# Patient Record
Sex: MALE | Race: WHITE | NOT HISPANIC OR LATINO | Employment: UNEMPLOYED | URBAN - METROPOLITAN AREA
[De-identification: names, ages, dates, MRNs, and addresses within clinical notes are randomized per-mention and may not be internally consistent; named-entity substitution may affect disease eponyms.]

---

## 2017-11-11 ENCOUNTER — HOSPITAL ENCOUNTER (EMERGENCY)
Facility: MEDICAL CENTER | Age: 62
End: 2017-11-11
Attending: EMERGENCY MEDICINE
Payer: MEDICARE

## 2017-11-11 VITALS
BODY MASS INDEX: 30.49 KG/M2 | WEIGHT: 213 LBS | RESPIRATION RATE: 18 BRPM | TEMPERATURE: 97.7 F | HEIGHT: 70 IN | DIASTOLIC BLOOD PRESSURE: 87 MMHG | HEART RATE: 80 BPM | OXYGEN SATURATION: 96 % | SYSTOLIC BLOOD PRESSURE: 175 MMHG

## 2017-11-11 DIAGNOSIS — R73.9 HYPERGLYCEMIA: ICD-10-CM

## 2017-11-11 DIAGNOSIS — L03.115 CELLULITIS OF RIGHT FOOT: ICD-10-CM

## 2017-11-11 LAB
ALBUMIN SERPL BCP-MCNC: 3.4 G/DL (ref 3.2–4.9)
ALBUMIN/GLOB SERPL: 1.1 G/DL
ALP SERPL-CCNC: 94 U/L (ref 30–99)
ALT SERPL-CCNC: 12 U/L (ref 2–50)
ANION GAP SERPL CALC-SCNC: 11 MMOL/L (ref 0–11.9)
AST SERPL-CCNC: 11 U/L (ref 12–45)
BASOPHILS # BLD AUTO: 0.1 % (ref 0–1.8)
BASOPHILS # BLD: 0.02 K/UL (ref 0–0.12)
BILIRUB SERPL-MCNC: 0.3 MG/DL (ref 0.1–1.5)
BUN SERPL-MCNC: 16 MG/DL (ref 8–22)
CALCIUM SERPL-MCNC: 8.8 MG/DL (ref 8.5–10.5)
CHLORIDE SERPL-SCNC: 104 MMOL/L (ref 96–112)
CO2 SERPL-SCNC: 25 MMOL/L (ref 20–33)
CREAT SERPL-MCNC: 0.95 MG/DL (ref 0.5–1.4)
EOSINOPHIL # BLD AUTO: 0.01 K/UL (ref 0–0.51)
EOSINOPHIL NFR BLD: 0.1 % (ref 0–6.9)
ERYTHROCYTE [DISTWIDTH] IN BLOOD BY AUTOMATED COUNT: 45.8 FL (ref 35.9–50)
GFR SERPL CREATININE-BSD FRML MDRD: >60 ML/MIN/1.73 M 2
GLOBULIN SER CALC-MCNC: 3.1 G/DL (ref 1.9–3.5)
GLUCOSE SERPL-MCNC: 471 MG/DL (ref 65–99)
HCT VFR BLD AUTO: 45.2 % (ref 42–52)
HGB BLD-MCNC: 14.6 G/DL (ref 14–18)
IMM GRANULOCYTES # BLD AUTO: 0.08 K/UL (ref 0–0.11)
IMM GRANULOCYTES NFR BLD AUTO: 0.6 % (ref 0–0.9)
LYMPHOCYTES # BLD AUTO: 0.9 K/UL (ref 1–4.8)
LYMPHOCYTES NFR BLD: 6.2 % (ref 22–41)
MCH RBC QN AUTO: 29.2 PG (ref 27–33)
MCHC RBC AUTO-ENTMCNC: 32.3 G/DL (ref 33.7–35.3)
MCV RBC AUTO: 90.4 FL (ref 81.4–97.8)
MONOCYTES # BLD AUTO: 0.46 K/UL (ref 0–0.85)
MONOCYTES NFR BLD AUTO: 3.2 % (ref 0–13.4)
NEUTROPHILS # BLD AUTO: 13.07 K/UL (ref 1.82–7.42)
NEUTROPHILS NFR BLD: 89.8 % (ref 44–72)
NRBC # BLD AUTO: 0 K/UL
NRBC BLD AUTO-RTO: 0 /100 WBC
PLATELET # BLD AUTO: 361 K/UL (ref 164–446)
PMV BLD AUTO: 9.4 FL (ref 9–12.9)
POTASSIUM SERPL-SCNC: 4.1 MMOL/L (ref 3.6–5.5)
PROT SERPL-MCNC: 6.5 G/DL (ref 6–8.2)
RBC # BLD AUTO: 5 M/UL (ref 4.7–6.1)
SODIUM SERPL-SCNC: 140 MMOL/L (ref 135–145)
WBC # BLD AUTO: 14.5 K/UL (ref 4.8–10.8)

## 2017-11-11 PROCEDURE — 96365 THER/PROPH/DIAG IV INF INIT: CPT

## 2017-11-11 PROCEDURE — 700111 HCHG RX REV CODE 636 W/ 250 OVERRIDE (IP): Performed by: EMERGENCY MEDICINE

## 2017-11-11 PROCEDURE — 99284 EMERGENCY DEPT VISIT MOD MDM: CPT

## 2017-11-11 PROCEDURE — 87040 BLOOD CULTURE FOR BACTERIA: CPT

## 2017-11-11 PROCEDURE — 85025 COMPLETE CBC W/AUTO DIFF WBC: CPT

## 2017-11-11 PROCEDURE — 80053 COMPREHEN METABOLIC PANEL: CPT

## 2017-11-11 RX ORDER — CEPHALEXIN 500 MG/1
500 CAPSULE ORAL 4 TIMES DAILY
Qty: 40 CAP | Refills: 0 | Status: SHIPPED | OUTPATIENT
Start: 2017-11-11 | End: 2017-11-21

## 2017-11-11 RX ORDER — LISINOPRIL 40 MG/1
40 TABLET ORAL DAILY
COMMUNITY

## 2017-11-11 RX ORDER — FLUTICASONE PROPIONATE 50 MCG
2 SPRAY, SUSPENSION (ML) NASAL DAILY
COMMUNITY

## 2017-11-11 RX ORDER — AMPICILLIN AND SULBACTAM 2; 1 G/1; G/1
3 INJECTION, POWDER, FOR SOLUTION INTRAMUSCULAR; INTRAVENOUS ONCE
Status: COMPLETED | OUTPATIENT
Start: 2017-11-11 | End: 2017-11-11

## 2017-11-11 RX ORDER — RANITIDINE 150 MG/1
150 TABLET ORAL DAILY
COMMUNITY

## 2017-11-11 RX ORDER — PREDNISONE 10 MG/1
10-40 TABLET ORAL DAILY
COMMUNITY

## 2017-11-11 RX ORDER — AZITHROMYCIN 500 MG/1
500 TABLET, FILM COATED ORAL DAILY
COMMUNITY
Start: 2017-11-02

## 2017-11-11 RX ADMIN — AMPICILLIN SODIUM AND SULBACTAM SODIUM 3 G: 2; 1 INJECTION, POWDER, FOR SOLUTION INTRAMUSCULAR; INTRAVENOUS at 14:49

## 2017-11-11 ASSESSMENT — PAIN SCALES - GENERAL: PAINLEVEL_OUTOF10: 8

## 2017-11-11 NOTE — ED NOTES
The Medication Reconciliation process has been completed by interviewing the patient who had his meds at bedside.    Allergies have been reviewed  Antibiotic use in 30 days - [facility]    Home Pharmacy:  [facility] - [address]

## 2017-11-11 NOTE — ED NOTES
"Pt to triage, states \" he was dc'd from [facility] at the beginning of the month, c/o increased rt foot swelling/ redness , states \" he is diabetic \" , states \" it is red, but denies drainage\" , explained to pt that if he was roomed and called, and was not in the ER he would lose his spot in the waitign room - pt states \" well I don't want to hang around\" , pt to lobby   "

## 2017-11-11 NOTE — ED PROVIDER NOTES
"ED Provider Note    CHIEF COMPLAINT  Chief Complaint   Patient presents with   • Foot Swelling     rt foot   • Wound Infection       HPI  César Duarte is a 62 y.o. male who presents For evaluation of right-sided foot swelling and concerns over cellulitis, he is a diabetic, reports over the past 3 or 4 days he's noted a slight increase in the redness with a little bit of swelling, he does have chronic neuropathy but no changes in his normal sensation, he denies fever, he said no nausea or vomiting. He states that his glucoses generally poorly controlled, he tells me that he has been prescribed medications that he doesn't take for his diabetes. He has no other complaints at this time, he states that he was admitted to [facility] a week ago or so secondary to a bronchial infection.    REVIEW OF SYSTEMS  Negative for fever, abdominal pain, nausea, vomiting, diarrhea, headache. All other systems are negative.     PAST MEDICAL HISTORY  No past medical history on file.    FAMILY HISTORY  No family history on file.    SOCIAL HISTORY  Social History   Substance Use Topics   • Smoking status: Not on file   • Smokeless tobacco: Not on file   • Alcohol use Not on file       SURGICAL HISTORY  No past surgical history on file.    CURRENT MEDICATIONS  I personally reviewed the medication list in the charting documentation.     ALLERGIES  Allergies   Allergen Reactions   • Contrast Media With Iodine [Iodine]        MEDICAL RECORD  I have reviewed patient's medical record and pertinent results are listed above.      PHYSICAL EXAM  VITAL SIGNS: BP (!) 175/87   Pulse 78   Temp 36.5 °C (97.7 °F)   Resp 18   Ht 1.778 m (5' 10\")   Wt 96.6 kg (213 lb)   SpO2 98%   BMI 30.56 kg/m²    Constitutional: Chronically ill-appearing but without overt evidence of acute illness  HENT: Mucus membranes moist.    Eyes: No scleral icterus. Normal conjunctiva   Neck: Supple, comfortable, nonpainful range of motion. "   Cardiovascular: Regular heart rate and rhythm.   Thorax & Lungs: Chest is nontender.  Lungs are clear to auscultation with good air movement bilaterally.  No wheeze, rhonchi, nor rales.   Abdomen: Soft, with no tenderness, rebound nor guarding.  No mass or pulsatile mass appreciated.  Skin: Warm, dry. No rash appreciated  Extremities/Musculoskeletal: Subtle erythema of the right foot with no appreciable warmth, normal sensation distally and normal cap refill distally. Generally dry and scaling skin on his feet.  Neurologic: Alert and oriented ×4. 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  Psychiatric: Normal affect appropriate for the clinical situation.    DIAGNOSTIC STUDIES / PROCEDURES    LABS  Results for orders placed or performed during the hospital encounter of 11/11/17   CBC WITH DIFFERENTIAL   Result Value Ref Range    WBC 14.5 (H) 4.8 - 10.8 K/uL    RBC 5.00 4.70 - 6.10 M/uL    Hemoglobin 14.6 14.0 - 18.0 g/dL    Hematocrit 45.2 42.0 - 52.0 %    MCV 90.4 81.4 - 97.8 fL    MCH 29.2 27.0 - 33.0 pg    MCHC 32.3 (L) 33.7 - 35.3 g/dL    RDW 45.8 35.9 - 50.0 fL    Platelet Count 361 164 - 446 K/uL    MPV 9.4 9.0 - 12.9 fL    Neutrophils-Polys 89.80 (H) 44.00 - 72.00 %    Lymphocytes 6.20 (L) 22.00 - 41.00 %    Monocytes 3.20 0.00 - 13.40 %    Eosinophils 0.10 0.00 - 6.90 %    Basophils 0.10 0.00 - 1.80 %    Immature Granulocytes 0.60 0.00 - 0.90 %    Nucleated RBC 0.00 /100 WBC    Neutrophils (Absolute) 13.07 (H) 1.82 - 7.42 K/uL    Lymphs (Absolute) 0.90 (L) 1.00 - 4.80 K/uL    Monos (Absolute) 0.46 0.00 - 0.85 K/uL    Eos (Absolute) 0.01 0.00 - 0.51 K/uL    Baso (Absolute) 0.02 0.00 - 0.12 K/uL    Immature Granulocytes (abs) 0.08 0.00 - 0.11 K/uL    NRBC (Absolute) 0.00 K/uL   COMP METABOLIC PANEL   Result Value Ref Range    Sodium 140 135 - 145 mmol/L    Potassium 4.1 3.6 - 5.5 mmol/L    Chloride 104 96 - 112 mmol/L    Co2 25 20 - 33 mmol/L    Anion Gap 11.0 0.0 - 11.9     Glucose 471 (H) 65 - 99 mg/dL    Bun 16 8 - 22 mg/dL    Creatinine 0.95 0.50 - 1.40 mg/dL    Calcium 8.8 8.5 - 10.5 mg/dL    AST(SGOT) 11 (L) 12 - 45 U/L    ALT(SGPT) 12 2 - 50 U/L    Alkaline Phosphatase 94 30 - 99 U/L    Total Bilirubin 0.3 0.1 - 1.5 mg/dL    Albumin 3.4 3.2 - 4.9 g/dL    Total Protein 6.5 6.0 - 8.2 g/dL    Globulin 3.1 1.9 - 3.5 g/dL    A-G Ratio 1.1 g/dL   ESTIMATED GFR   Result Value Ref Range    GFR If African American >60 >60 mL/min/1.73 m 2    GFR If Non African American >60 >60 mL/min/1.73 m 2         COURSE & MEDICAL DECISION MAKING  I have reviewed any medical record information, laboratory studies and radiographic results as noted above.  Differential diagnoses includes: Cellulitis, DKA, dehydration, electrolyte abnormalities    Encounter Summary: This is a 62 y.o. male with concern for cellulitis of his right foot, very minor findings on exam. Vital signs reveal hypertension but no tachycardia, he is not febrile, no other acute findings on exam. I will administer a dose of Unasyn, will check blood work. At this point, he is not febrile, is not tachycardic, he is well-appearing so I suspect he will do fine with oral antibiotics. Will go over strict return instructions and at this time he'll be stable and appropriate to be discharged. The patient reports that he does intact have a primary care [facility] within [facility], I have urged him to follow-up with this provider, he will be prescribed Keflex.      DISPOSITION: Discharge home in stable condition      FINAL IMPRESSION  1. Cellulitis of right foot    2. Hyperglycemia           This dictation was created using voice recognition software. The accuracy of the dictation is limited to the abilities of the software. I expect there may be some errors of grammar and possibly content. The nursing notes were reviewed and certain aspects of this information were incorporated into this note.    Electronically signed by: Adan Aaron,  11/11/2017 2:12 PM

## 2017-11-11 NOTE — DISCHARGE INSTRUCTIONS
Return immediately to the emergency department at the first sign of worsening infection including increasing redness, increasing pain, pus in the wound, fever or if you have any other concerns.        Cellulitis  Cellulitis is an infection of the skin and the tissue beneath it. The infected area is usually red and tender. Cellulitis occurs most often in the arms and lower legs.   CAUSES   Cellulitis is caused by bacteria that enter the skin through cracks or cuts in the skin. The most common types of bacteria that cause cellulitis are staphylococci and streptococci.  SIGNS AND SYMPTOMS   · Redness and warmth.  · Swelling.  · Tenderness or pain.  · Fever.  DIAGNOSIS   Your health care provider can usually determine what is wrong based on a physical exam. Blood tests may also be done.  TREATMENT   Treatment usually involves taking an antibiotic medicine.  HOME CARE INSTRUCTIONS   · Take your antibiotic medicine as directed by your health care provider. Finish the antibiotic even if you start to feel better.  · Keep the infected arm or leg elevated to reduce swelling.  · Apply a warm cloth to the affected area up to 4 times per day to relieve pain.  · Take medicines only as directed by your health care provider.  · Keep all follow-up visits as directed by your health care provider.  SEEK MEDICAL CARE IF:   · You notice red streaks coming from the infected area.  · Your red area gets larger or turns dark in color.  · Your bone or joint underneath the infected area becomes painful after the skin has healed.  · Your infection returns in the same area or another area.  · You notice a swollen bump in the infected area.  · You develop new symptoms.  · You have a fever.  SEEK IMMEDIATE MEDICAL CARE IF:   · You feel very sleepy.  · You develop vomiting or diarrhea.  · You have a general ill feeling (malaise) with muscle aches and pains.  MAKE SURE YOU:   · Understand these instructions.  · Will watch your condition.  · Will  get help right away if you are not doing well or get worse.     This information is not intended to replace advice given to you by your health care provider. Make sure you discuss any questions you have with your health care provider.     Document Released: 09/27/2006 Document Revised: 01/08/2016 Document Reviewed: 03/04/2013  Elsevier Interactive Patient Education ©2016 [employer] Inc.

## 2017-11-11 NOTE — ED NOTES
Pt amb to triage desk asking about wait time, I informed him that he was not in the lobby when the triage nurse called him twice, so he was taken out of the system. Pt is now back in the lobby will re-admit him to the department.

## 2017-11-12 NOTE — ED NOTES
Pt discharge home. Pt given discharge instructions . Pt verbalized understanding, all questions answered ,vss upon d/c.

## 2017-11-16 LAB
BACTERIA BLD CULT: NORMAL
BACTERIA BLD CULT: NORMAL
SIGNIFICANT IND 70042: NORMAL
SIGNIFICANT IND 70042: NORMAL
SITE SITE: NORMAL
SITE SITE: NORMAL
SOURCE SOURCE: NORMAL
SOURCE SOURCE: NORMAL

## 2018-08-07 ENCOUNTER — APPOINTMENT (OUTPATIENT)
Dept: RADIOLOGY | Facility: MEDICAL CENTER | Age: 63
End: 2018-08-07
Attending: EMERGENCY MEDICINE
Payer: MEDICARE

## 2018-08-07 ENCOUNTER — HOSPITAL ENCOUNTER (EMERGENCY)
Facility: MEDICAL CENTER | Age: 63
End: 2018-08-07
Attending: EMERGENCY MEDICINE
Payer: MEDICARE

## 2018-08-07 VITALS
SYSTOLIC BLOOD PRESSURE: 189 MMHG | HEIGHT: 70 IN | DIASTOLIC BLOOD PRESSURE: 90 MMHG | RESPIRATION RATE: 20 BRPM | OXYGEN SATURATION: 94 % | WEIGHT: 213 LBS | HEART RATE: 80 BPM | BODY MASS INDEX: 30.49 KG/M2

## 2018-08-07 DIAGNOSIS — R60.9 EDEMA, UNSPECIFIED TYPE: ICD-10-CM

## 2018-08-07 DIAGNOSIS — Z91.199 NONCOMPLIANCE: ICD-10-CM

## 2018-08-07 DIAGNOSIS — I10 HYPERTENSION, UNSPECIFIED TYPE: ICD-10-CM

## 2018-08-07 LAB
ALBUMIN SERPL BCP-MCNC: 4.3 G/DL (ref 3.2–4.9)
ALBUMIN/GLOB SERPL: 1.7 G/DL
ALP SERPL-CCNC: 103 U/L (ref 30–99)
ALT SERPL-CCNC: 16 U/L (ref 2–50)
ANION GAP SERPL CALC-SCNC: 6 MMOL/L (ref 0–11.9)
APTT PPP: 27.6 SEC (ref 24.7–36)
AST SERPL-CCNC: 18 U/L (ref 12–45)
BASOPHILS # BLD AUTO: 1 % (ref 0–1.8)
BASOPHILS # BLD: 0.08 K/UL (ref 0–0.12)
BILIRUB SERPL-MCNC: 0.6 MG/DL (ref 0.1–1.5)
BNP SERPL-MCNC: 16 PG/ML (ref 0–100)
BUN SERPL-MCNC: 13 MG/DL (ref 8–22)
CALCIUM SERPL-MCNC: 9 MG/DL (ref 8.5–10.5)
CHLORIDE SERPL-SCNC: 104 MMOL/L (ref 96–112)
CO2 SERPL-SCNC: 30 MMOL/L (ref 20–33)
CREAT SERPL-MCNC: 0.92 MG/DL (ref 0.5–1.4)
EKG IMPRESSION: NORMAL
EOSINOPHIL # BLD AUTO: 0.13 K/UL (ref 0–0.51)
EOSINOPHIL NFR BLD: 1.7 % (ref 0–6.9)
ERYTHROCYTE [DISTWIDTH] IN BLOOD BY AUTOMATED COUNT: 43.8 FL (ref 35.9–50)
GLOBULIN SER CALC-MCNC: 2.6 G/DL (ref 1.9–3.5)
GLUCOSE SERPL-MCNC: 167 MG/DL (ref 65–99)
HCT VFR BLD AUTO: 50.7 % (ref 42–52)
HGB BLD-MCNC: 17 G/DL (ref 14–18)
IMM GRANULOCYTES # BLD AUTO: 0.02 K/UL (ref 0–0.11)
IMM GRANULOCYTES NFR BLD AUTO: 0.3 % (ref 0–0.9)
INR PPP: 1 (ref 0.87–1.13)
LYMPHOCYTES # BLD AUTO: 2.15 K/UL (ref 1–4.8)
LYMPHOCYTES NFR BLD: 27.8 % (ref 22–41)
MCH RBC QN AUTO: 29.1 PG (ref 27–33)
MCHC RBC AUTO-ENTMCNC: 33.5 G/DL (ref 33.7–35.3)
MCV RBC AUTO: 86.8 FL (ref 81.4–97.8)
MONOCYTES # BLD AUTO: 0.62 K/UL (ref 0–0.85)
MONOCYTES NFR BLD AUTO: 8 % (ref 0–13.4)
NEUTROPHILS # BLD AUTO: 4.74 K/UL (ref 1.82–7.42)
NEUTROPHILS NFR BLD: 61.2 % (ref 44–72)
NRBC # BLD AUTO: 0 K/UL
NRBC BLD-RTO: 0 /100 WBC
PLATELET # BLD AUTO: 287 K/UL (ref 164–446)
PMV BLD AUTO: 8.7 FL (ref 9–12.9)
POTASSIUM SERPL-SCNC: 3.6 MMOL/L (ref 3.6–5.5)
PROT SERPL-MCNC: 6.9 G/DL (ref 6–8.2)
PROTHROMBIN TIME: 12.9 SEC (ref 12–14.6)
RBC # BLD AUTO: 5.84 M/UL (ref 4.7–6.1)
SODIUM SERPL-SCNC: 140 MMOL/L (ref 135–145)
TROPONIN I SERPL-MCNC: 0.02 NG/ML (ref 0–0.04)
WBC # BLD AUTO: 7.7 K/UL (ref 4.8–10.8)

## 2018-08-07 PROCEDURE — 84484 ASSAY OF TROPONIN QUANT: CPT

## 2018-08-07 PROCEDURE — 93005 ELECTROCARDIOGRAM TRACING: CPT | Performed by: EMERGENCY MEDICINE

## 2018-08-07 PROCEDURE — 94760 N-INVAS EAR/PLS OXIMETRY 1: CPT

## 2018-08-07 PROCEDURE — 80053 COMPREHEN METABOLIC PANEL: CPT

## 2018-08-07 PROCEDURE — 83880 ASSAY OF NATRIURETIC PEPTIDE: CPT

## 2018-08-07 PROCEDURE — 71045 X-RAY EXAM CHEST 1 VIEW: CPT

## 2018-08-07 PROCEDURE — 85025 COMPLETE CBC W/AUTO DIFF WBC: CPT

## 2018-08-07 PROCEDURE — 93971 EXTREMITY STUDY: CPT

## 2018-08-07 PROCEDURE — 85730 THROMBOPLASTIN TIME PARTIAL: CPT

## 2018-08-07 PROCEDURE — 99284 EMERGENCY DEPT VISIT MOD MDM: CPT

## 2018-08-07 PROCEDURE — 85610 PROTHROMBIN TIME: CPT

## 2018-08-07 ASSESSMENT — PAIN SCALES - GENERAL: PAINLEVEL_OUTOF10: 8

## 2018-08-07 NOTE — DISCHARGE INSTRUCTIONS
Hypertension  Hypertension is another name for high blood pressure. High blood pressure forces your heart to work harder to pump blood. A blood pressure reading has two numbers, which includes a higher number over a lower number (example: 110/72).  Follow these instructions at home:  · Have your blood pressure rechecked by your doctor.  · Only take medicine as told by your doctor. Follow the directions carefully. The medicine does not work as well if you skip doses. Skipping doses also puts you at risk for problems.  · Do not smoke.  · Monitor your blood pressure at home as told by your doctor.  Contact a doctor if:  · You think you are having a reaction to the medicine you are taking.  · You have repeat headaches or feel dizzy.  · You have puffiness (swelling) in your ankles.  · You have trouble with your vision.  Get help right away if:  · You get a very bad headache and are confused.  · You feel weak, numb, or faint.  · You get chest or belly (abdominal) pain.  · You throw up (vomit).  · You cannot breathe very well.  This information is not intended to replace advice given to you by your health care provider. Make sure you discuss any questions you have with your health care provider.  Document Released: 06/05/2009 Document Revised: 05/25/2017 Document Reviewed: 10/10/2014  [employer] Interactive Patient Education © 2017 [employer]  Peripheral Edema  Peripheral edema is swelling that is caused by a buildup of fluid. Peripheral edema most often affects the lower legs, ankles, and feet. It can also develop in the arms, hands, and face. The area of the body that has peripheral edema will look swollen. It may also feel heavy or warm. Your clothes may start to feel tight. Pressing on the area may make a temporary dent in your skin. You may not be able to move your arm or leg as much as usual.  There are many causes of peripheral edema. It can be a complication of other diseases, such as congestive heart failure,  kidney disease, or a problem with your blood circulation. It also can be a side effect of certain medicines. It often happens to women during pregnancy. Sometimes, the cause is not known. Treating the underlying condition is often the only treatment for peripheral edema.  Follow these instructions at home:  Pay attention to any changes in your symptoms. Take these actions to help with your discomfort:  · Raise (elevate) your legs while you are sitting or lying down.  · Move around often to prevent stiffness and to lessen swelling. Do not sit or stand for long periods of time.  · Wear support stockings as told by your health care provider.  · Follow instructions from your health care provider about limiting salt (sodium) in your diet. Sometimes eating less salt can reduce swelling.  · Take over-the-counter and prescription medicines only as told by your health care provider. Your health care provider may prescribe medicine to help your body get rid of excess water (diuretic).  · Keep all follow-up visits as told by your health care provider. This is important.  Contact a health care provider if:  · You have a fever.  · Your edema starts suddenly or is getting worse, especially if you are pregnant or have a medical condition.  · You have swelling in only one leg.  · You have increased swelling and pain in your legs.  Get help right away if:  · You develop shortness of breath, especially when you are lying down.  · You have pain in your chest or abdomen.  · You feel weak.  · You faint.  This information is not intended to replace advice given to you by your health care provider. Make sure you discuss any questions you have with your health care provider.  Document Released: 01/25/2006 Document Revised: 05/22/2017 Document Reviewed: 06/29/2016  [employer] Interactive Patient Education © 2017 Elsevier Inc.

## 2018-08-07 NOTE — ED NOTES
Pt sitting up in bed eating box lunch. [facility] schedule to come  pt at 1800. Pt aware of plan, denies any further needs at this time.

## 2018-08-07 NOTE — ED PROVIDER NOTES
ED Provider Note    Scribed for Mark Leiva M.D. by Jacinta Brown. 8/7/2018, 11:01 AM.    Primary care provider: None  Means of arrival: Ambulance  History obtained from: Patient  History limited by: None    CHIEF COMPLAINT  •  Extremity Swelling      HPI  César Duarte is a 63 y.o. male who presents to the Emergency Department by ambulance for extremity swelling with an onset of 3 weeks. Patient developed bilateral lower extremity swelling three weeks ago. Swelling is greater on the right than left. He is wheel chair bound with paralysis to his right side. He is experiencing dizziness and shortness of breath. Breathing worsens with laying flat. Negative for cough, chest pain, headache, focal weakness or numbness. History of diabetes and hypertension but is non-complaint with his medications.       REVIEW OF SYSTEMS  Pertinent positives include bilateral lower extremity swelling, dizziness and shortness of breath.   Pertinent negatives include no cough, chest pain, headache, focal weakness or numbness.  As above, all other systems reviewed and are negative. See HPI for further details. C.      PAST MEDICAL HISTORY  History of hypertension and diabetes.      SURGICAL HISTORY  Patient denies a pertinent surgical history.        SOCIAL HISTORY  Patient denies a pertinent social history.       FAMILY HISTORY  History reviewed. No pertinent family history.        CURRENT MEDICATIONS  Home Medications     Reviewed by Merlyn Norman R.N. (Registered Nurse) on 08/07/18 at 1053  Med List Status: Partial   Medication Last Dose Status   AMLODIPINE BESYLATE PO  Active   azithromycin (ZITHROMAX) 500 MG tablet 11/6/2017 Active   fluticasone (FLONASE) 50 MCG/ACT nasal spray 11/11/2017 Active   lisinopril (PRINIVIL, ZESTRIL) 40 MG tablet 11/11/2017 Active   predniSONE (DELTASONE) 10 MG Tab 11/10/2017 Active   ranitidine (ZANTAC) 150 MG Tab 11/11/2017 Active                ALLERGIES  Allergies   Allergen Reactions   •  "Contrast Media With Iodine [Iodine]        PHYSICAL EXAM  VITAL SIGNS: BP (!) 192/102   Pulse 80   Resp 20   Ht 1.778 m (5' 10\")   Wt 96.6 kg (213 lb)   SpO2 96%   BMI 30.56 kg/m²     Vitals reviewed.    Constitutional: Alert in no apparent distress.  HENT: No signs of trauma, Bilateral external ears normal, Nose normal.   Eyes: Pupils are equal and reactive, Conjunctiva normal, Non-icteric.   Neck: Normal range of motion, No tenderness, Supple, No stridor.   Lymphatic: No lymphadenopathy noted.   Cardiovascular: Regular rate and rhythm, no murmurs.   Thorax & Lungs: Normal breath sounds, No respiratory distress, No wheezing, No chest tenderness.   Abdomen: Bowel sounds normal, Soft, No tenderness, No peritoneal signs, No masses, No pulsatile masses.   Skin: Warm, Dry, No erythema, No rash.   Back: No bony tenderness, No CVA tenderness.   Extremities: Intact distal pulses, No cyanosis, 1+ peripheral pulses on lower extremities, 1+ pitting edema to left ankle. 2+ pitting edema to right ankle.   Musculoskeletal:  No tenderness to palpation or major deformities noted.   Neurologic: Alert, Normal sensory function. Dense paresis on right. Strength is 5/5 on left.  Psychiatric: Affect normal, Judgment normal, Mood normal.       DIAGNOSTIC STUDIES / PROCEDURES    LABS  Labs Reviewed   CBC WITH DIFFERENTIAL - Abnormal; Notable for the following:        Result Value    MCHC 33.5 (*)     MPV 8.7 (*)     All other components within normal limits    Narrative:     Indicate which anticoagulants the patient is on:->UNKNOWN   COMP METABOLIC PANEL - Abnormal; Notable for the following:     Glucose 167 (*)     Alkaline Phosphatase 103 (*)     All other components within normal limits    Narrative:     Indicate which anticoagulants the patient is on:->UNKNOWN   BTYPE NATRIURETIC PEPTIDE    Narrative:     Indicate which anticoagulants the patient is on:->UNKNOWN   PROTHROMBIN TIME    Narrative:     Indicate which anticoagulants " the patient is on:->UNKNOWN   APTT    Narrative:     Indicate which anticoagulants the patient is on:->UNKNOWN   TROPONIN    Narrative:     Indicate which anticoagulants the patient is on:->UNKNOWN   ESTIMATED GFR    Narrative:     Indicate which anticoagulants the patient is on:->UNKNOWN      All labs reviewed by me.      EKG Interpretation:  Interpreted by me    12 Lead EKG interpreted by me to show:  Normal sinus rhythm  Rate 76  Axis: Normal  Intervals: prolonged   Normal T waves  Nonspecific ST segment changes  My impression of this EKG: Nonspecific ST segment changes, prolonged QTC interval, Does not indicate STEMI criteria at this time  No old for comparison       RADIOLOGY  LE VENOUS DUPLEX (Specify in Comments Left, Right Or Bilateral)         DX-CHEST-PORTABLE (1 VIEW)   Final Result      No acute cardiopulmonary disease evident.        The radiologist's interpretation of all radiological studies have been reviewed by me.      COURSE & MEDICAL DECISION MAKING  Pertinent Labs & Imaging studies reviewed. (See chart for details)    Differential Diagnoses include but are not limited to: congestive heart failure and peripheral edema.    11:01 AM Obtained and reviewed patient's electronic medical records which indicate an ED visit in 11/2017 for foot swelling and wound infection.    11:15 AM Patient seen and examined at bedside. Patient presents for extremity swelling.      Initial orders in the Emergency Department included EKG, XR chest and laboratory testing: CBC with differential, CMP, estimated GFR, BNP, prothrombin time, APTT and troponin.      2:02 PM Ordered a right lower extremity venous duplex.    3:27 PM the patient's ultrasound shows no evidence of DVT, the patient's labs show no indication of CHF, renal failure, liver failure.  He is suffering from peripheral edema.      I encouraged the patient to take his antihypertensives.  I have encouraged him to follow[facility].  In the emergency department he has had asymptomatic hypertension and has stated that he is refusing to take antihypertensive medications.  I would like him to follow-up with his doctor and have further discussion about this.        Return to the ED for new or persisting symptoms including chest pain or any additional concerns.  The patient verbalizes understanding and will comply.  Patient is stable at the time of discharge.  Vital signs were reviewed: The patient has been hypertensive but is stable at time of discharge with asymptomatic hypertension and noncompliance with antihypertension medications I encouraged him take his medications.      DISPOSITION  Patient will be discharged home in stable condition.      FOLLOW UP  [facility], Emergency Dept  [address] [address]  [phone]    If symptoms worsen    [address] [id number] [phone]    see your primary care doctor for follow up      The patient is referred to a primary physician for blood pressure management, diabetic screening, and for all other preventative health concerns.      OUTPATIENT MEDICATIONS  New Prescriptions    No medications on file       DIAGNOSIS  1. Edema, unspecified type    2. Hypertension, unspecified type    3. Noncompliance           The note accurately reflects work and decisions made by me.  Mark Leiva  8/7/2018  3:28 PM     Jacinta DEAN (Scribe), am scribing for, and in the presence of, Mark Leiva M.D.    Electronically signed by: Jacinta Brown ([email or web address]), 8/7/2018    Mark DEAN M.D. personally performed the services described in this documentation, as scribed by Jacinta Brown in my presence, and it is both accurate and complete.

## 2018-08-07 NOTE — ED TRIAGE NOTES
"Chief Complaint   Patient presents with   • Foot Pain     Right foot pain, swelling x 1 month.    • Dizziness       +2 pitting edema to right foot noted. Pt has history of HTN non-compliant with medications. Pt is a diabetic-uncontrolled. States takes metformin \"sometimes\".     BP (!) 192/102   Pulse 80   Resp 20   Ht 1.778 m (5' 10\")   Wt 96.6 kg (213 lb)   SpO2 96%   BMI 30.56 kg/m²     "

## 2018-08-07 NOTE — DISCHARGE PLANNING
SW notified by RN that Pt needs help getting home. 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 Pt uses electric Wheelchair at home but it was left there when he came in by ambulance.     PCS  Completed and faxed to [facility]  Transport arranged for 1800 .  RN updated on [facility]  time.

## 2018-08-08 NOTE — ED NOTES
Report to EMS.     Discharge paperwork completed with pt-verbalized understanding. Denies any further questions or concerns.

## 2020-08-09 ENCOUNTER — HOSPITAL ENCOUNTER (EMERGENCY)
Dept: HOSPITAL 8 - ED | Age: 65
Discharge: HOME | End: 2020-08-09
Payer: SELF-PAY

## 2020-08-09 VITALS — WEIGHT: 200.62 LBS | HEIGHT: 70 IN | BODY MASS INDEX: 28.72 KG/M2

## 2020-08-09 VITALS — DIASTOLIC BLOOD PRESSURE: 97 MMHG | SYSTOLIC BLOOD PRESSURE: 124 MMHG

## 2020-08-09 DIAGNOSIS — E11.9: ICD-10-CM

## 2020-08-09 DIAGNOSIS — J44.9: ICD-10-CM

## 2020-08-09 DIAGNOSIS — F17.200: ICD-10-CM

## 2020-08-09 DIAGNOSIS — Z86.73: ICD-10-CM

## 2020-08-09 DIAGNOSIS — I10: ICD-10-CM

## 2020-08-09 DIAGNOSIS — B02.9: Primary | ICD-10-CM

## 2020-08-09 PROCEDURE — 99283 EMERGENCY DEPT VISIT LOW MDM: CPT

## 2021-03-03 DIAGNOSIS — Z23 NEED FOR VACCINATION: ICD-10-CM

## 2021-03-05 ENCOUNTER — APPOINTMENT (OUTPATIENT)
Dept: RADIOLOGY | Facility: MEDICAL CENTER | Age: 66
DRG: 190 | End: 2021-03-05
Attending: EMERGENCY MEDICINE
Payer: MEDICARE

## 2021-03-05 ENCOUNTER — HOSPITAL ENCOUNTER (INPATIENT)
Facility: MEDICAL CENTER | Age: 66
LOS: 14 days | DRG: 190 | End: 2021-03-19
Attending: EMERGENCY MEDICINE | Admitting: HOSPITALIST
Payer: MEDICARE

## 2021-03-05 DIAGNOSIS — J44.1 ACUTE EXACERBATION OF CHRONIC OBSTRUCTIVE PULMONARY DISEASE (COPD) (HCC): ICD-10-CM

## 2021-03-05 DIAGNOSIS — T50.905A DRUG-INDUCED RASH WITH EOSINOPHILIA AND SYSTEMIC SYMPTOMS: ICD-10-CM

## 2021-03-05 DIAGNOSIS — J96.01 ACUTE RESPIRATORY FAILURE WITH HYPOXIA (HCC): ICD-10-CM

## 2021-03-05 DIAGNOSIS — D72.12 DRUG-INDUCED RASH WITH EOSINOPHILIA AND SYSTEMIC SYMPTOMS: ICD-10-CM

## 2021-03-05 DIAGNOSIS — R09.02 HYPOXIA: ICD-10-CM

## 2021-03-05 DIAGNOSIS — Z02.9 DISCHARGE PLANNING ISSUES: ICD-10-CM

## 2021-03-05 DIAGNOSIS — S09.90XA CLOSED HEAD INJURY, INITIAL ENCOUNTER: ICD-10-CM

## 2021-03-05 PROBLEM — G93.40 ENCEPHALOPATHY: Status: ACTIVE | Noted: 2021-03-05

## 2021-03-05 PROBLEM — E11.9 DM (DIABETES MELLITUS) (HCC): Status: ACTIVE | Noted: 2021-03-05

## 2021-03-05 PROBLEM — R53.1 WEAKNESS: Status: ACTIVE | Noted: 2021-03-05

## 2021-03-05 PROBLEM — D72.829 LEUKOCYTOSIS: Status: ACTIVE | Noted: 2021-03-05

## 2021-03-05 PROBLEM — R55 SYNCOPE: Status: ACTIVE | Noted: 2021-03-05

## 2021-03-05 LAB
ABO + RH BLD: NORMAL
ABO GROUP BLD: NORMAL
ALBUMIN SERPL BCP-MCNC: 4 G/DL (ref 3.2–4.9)
ALBUMIN/GLOB SERPL: 1.3 G/DL
ALP SERPL-CCNC: 104 U/L (ref 30–99)
ALT SERPL-CCNC: 15 U/L (ref 2–50)
ANION GAP SERPL CALC-SCNC: 10 MMOL/L (ref 7–16)
APTT PPP: 30.4 SEC (ref 24.7–36)
AST SERPL-CCNC: 20 U/L (ref 12–45)
BILIRUB SERPL-MCNC: 0.4 MG/DL (ref 0.1–1.5)
BLD GP AB SCN SERPL QL: NORMAL
BUN SERPL-MCNC: 10 MG/DL (ref 8–22)
CALCIUM SERPL-MCNC: 8.7 MG/DL (ref 8.5–10.5)
CFT BLD TEG: 4.4 MIN (ref 5–10)
CHLORIDE SERPL-SCNC: 99 MMOL/L (ref 96–112)
CK SERPL-CCNC: 89 U/L (ref 0–154)
CLOT ANGLE BLD TEG: 68.5 DEGREES (ref 53–72)
CLOT LYSIS 30M P MA LENFR BLD TEG: 0 % (ref 0–8)
CO2 SERPL-SCNC: 30 MMOL/L (ref 20–33)
CREAT SERPL-MCNC: 1.02 MG/DL (ref 0.5–1.4)
CT.EXTRINSIC BLD ROTEM: 1.5 MIN (ref 1–3)
EKG IMPRESSION: NORMAL
ERYTHROCYTE [DISTWIDTH] IN BLOOD BY AUTOMATED COUNT: 48.9 FL (ref 35.9–50)
EST. AVERAGE GLUCOSE BLD GHB EST-MCNC: 177 MG/DL
ETHANOL BLD-MCNC: <10.1 MG/DL (ref 0–10)
GLOBULIN SER CALC-MCNC: 3.2 G/DL (ref 1.9–3.5)
GLUCOSE BLD-MCNC: 245 MG/DL (ref 65–99)
GLUCOSE SERPL-MCNC: 230 MG/DL (ref 65–99)
HBA1C MFR BLD: 7.8 % (ref 4–5.6)
HCT VFR BLD AUTO: 51.6 % (ref 42–52)
HGB BLD-MCNC: 16.4 G/DL (ref 14–18)
INR PPP: 0.84 (ref 0.87–1.13)
MAGNESIUM SERPL-MCNC: 2 MG/DL (ref 1.5–2.5)
MCF BLD TEG: 67.8 MM (ref 50–70)
MCH RBC QN AUTO: 29.9 PG (ref 27–33)
MCHC RBC AUTO-ENTMCNC: 31.8 G/DL (ref 33.7–35.3)
MCV RBC AUTO: 94.2 FL (ref 81.4–97.8)
NT-PROBNP SERPL IA-MCNC: 278 PG/ML (ref 0–125)
PA AA BLD-ACNC: 1.6 %
PA ADP BLD-ACNC: 15.7 %
PLATELET # BLD AUTO: 247 K/UL (ref 164–446)
PMV BLD AUTO: 9.6 FL (ref 9–12.9)
POTASSIUM SERPL-SCNC: 4.1 MMOL/L (ref 3.6–5.5)
PROCALCITONIN SERPL-MCNC: 0.17 NG/ML
PROT SERPL-MCNC: 7.2 G/DL (ref 6–8.2)
PROTHROMBIN TIME: 11.8 SEC (ref 12–14.6)
RBC # BLD AUTO: 5.48 M/UL (ref 4.7–6.1)
RH BLD: NORMAL
SARS-COV+SARS-COV-2 AG RESP QL IA.RAPID: NOTDETECTED
SARS-COV-2 RNA RESP QL NAA+PROBE: NOTDETECTED
SODIUM SERPL-SCNC: 139 MMOL/L (ref 135–145)
SPECIMEN SOURCE: NORMAL
SPECIMEN SOURCE: NORMAL
TEG ALGORITHM TGALG: ABNORMAL
TROPONIN T SERPL-MCNC: 30 NG/L (ref 6–19)
TROPONIN T SERPL-MCNC: 32 NG/L (ref 6–19)
WBC # BLD AUTO: 16.3 K/UL (ref 4.8–10.8)

## 2021-03-05 PROCEDURE — 99223 1ST HOSP IP/OBS HIGH 75: CPT | Mod: AI | Performed by: HOSPITALIST

## 2021-03-05 PROCEDURE — 700101 HCHG RX REV CODE 250: Performed by: HOSPITALIST

## 2021-03-05 PROCEDURE — 93005 ELECTROCARDIOGRAM TRACING: CPT | Performed by: EMERGENCY MEDICINE

## 2021-03-05 PROCEDURE — U0003 INFECTIOUS AGENT DETECTION BY NUCLEIC ACID (DNA OR RNA); SEVERE ACUTE RESPIRATORY SYNDROME CORONAVIRUS 2 (SARS-COV-2) (CORONAVIRUS DISEASE [COVID-19]), AMPLIFIED PROBE TECHNIQUE, MAKING USE OF HIGH THROUGHPUT TECHNOLOGIES AS DESCRIBED BY CMS-2020-01-R: HCPCS

## 2021-03-05 PROCEDURE — 82550 ASSAY OF CK (CPK): CPT

## 2021-03-05 PROCEDURE — 87040 BLOOD CULTURE FOR BACTERIA: CPT

## 2021-03-05 PROCEDURE — 305948 HCHG GREEN TRAUMA ACT PRE-NOTIFY NO CC

## 2021-03-05 PROCEDURE — A9270 NON-COVERED ITEM OR SERVICE: HCPCS | Performed by: HOSPITALIST

## 2021-03-05 PROCEDURE — 86900 BLOOD TYPING SEROLOGIC ABO: CPT

## 2021-03-05 PROCEDURE — 85730 THROMBOPLASTIN TIME PARTIAL: CPT

## 2021-03-05 PROCEDURE — 85576 BLOOD PLATELET AGGREGATION: CPT | Mod: 91

## 2021-03-05 PROCEDURE — 72125 CT NECK SPINE W/O DYE: CPT

## 2021-03-05 PROCEDURE — 82962 GLUCOSE BLOOD TEST: CPT

## 2021-03-05 PROCEDURE — U0005 INFEC AGEN DETEC AMPLI PROBE: HCPCS

## 2021-03-05 PROCEDURE — 700101 HCHG RX REV CODE 250: Performed by: EMERGENCY MEDICINE

## 2021-03-05 PROCEDURE — 700117 HCHG RX CONTRAST REV CODE 255: Performed by: EMERGENCY MEDICINE

## 2021-03-05 PROCEDURE — 700111 HCHG RX REV CODE 636 W/ 250 OVERRIDE (IP): Performed by: EMERGENCY MEDICINE

## 2021-03-05 PROCEDURE — 84484 ASSAY OF TROPONIN QUANT: CPT

## 2021-03-05 PROCEDURE — 96374 THER/PROPH/DIAG INJ IV PUSH: CPT

## 2021-03-05 PROCEDURE — 85384 FIBRINOGEN ACTIVITY: CPT

## 2021-03-05 PROCEDURE — 85027 COMPLETE CBC AUTOMATED: CPT

## 2021-03-05 PROCEDURE — 71260 CT THORAX DX C+: CPT

## 2021-03-05 PROCEDURE — 94640 AIRWAY INHALATION TREATMENT: CPT

## 2021-03-05 PROCEDURE — 770020 HCHG ROOM/CARE - TELE (206)

## 2021-03-05 PROCEDURE — 80053 COMPREHEN METABOLIC PANEL: CPT

## 2021-03-05 PROCEDURE — 71045 X-RAY EXAM CHEST 1 VIEW: CPT

## 2021-03-05 PROCEDURE — 83036 HEMOGLOBIN GLYCOSYLATED A1C: CPT

## 2021-03-05 PROCEDURE — C9803 HOPD COVID-19 SPEC COLLECT: HCPCS | Performed by: EMERGENCY MEDICINE

## 2021-03-05 PROCEDURE — 87426 SARSCOV CORONAVIRUS AG IA: CPT

## 2021-03-05 PROCEDURE — 99285 EMERGENCY DEPT VISIT HI MDM: CPT

## 2021-03-05 PROCEDURE — 72128 CT CHEST SPINE W/O DYE: CPT

## 2021-03-05 PROCEDURE — 700101 HCHG RX REV CODE 250

## 2021-03-05 PROCEDURE — 84145 PROCALCITONIN (PCT): CPT

## 2021-03-05 PROCEDURE — 94760 N-INVAS EAR/PLS OXIMETRY 1: CPT

## 2021-03-05 PROCEDURE — 700102 HCHG RX REV CODE 250 W/ 637 OVERRIDE(OP): Performed by: HOSPITALIST

## 2021-03-05 PROCEDURE — 36415 COLL VENOUS BLD VENIPUNCTURE: CPT

## 2021-03-05 PROCEDURE — 82077 ASSAY SPEC XCP UR&BREATH IA: CPT

## 2021-03-05 PROCEDURE — 83880 ASSAY OF NATRIURETIC PEPTIDE: CPT

## 2021-03-05 PROCEDURE — 70450 CT HEAD/BRAIN W/O DYE: CPT

## 2021-03-05 PROCEDURE — 94669 MECHANICAL CHEST WALL OSCILL: CPT

## 2021-03-05 PROCEDURE — 85347 COAGULATION TIME ACTIVATED: CPT

## 2021-03-05 PROCEDURE — 72131 CT LUMBAR SPINE W/O DYE: CPT

## 2021-03-05 PROCEDURE — 86901 BLOOD TYPING SEROLOGIC RH(D): CPT

## 2021-03-05 PROCEDURE — 86850 RBC ANTIBODY SCREEN: CPT

## 2021-03-05 PROCEDURE — 85610 PROTHROMBIN TIME: CPT

## 2021-03-05 PROCEDURE — 83735 ASSAY OF MAGNESIUM: CPT

## 2021-03-05 RX ORDER — IPRATROPIUM BROMIDE AND ALBUTEROL SULFATE 2.5; .5 MG/3ML; MG/3ML
3 SOLUTION RESPIRATORY (INHALATION)
Status: DISCONTINUED | OUTPATIENT
Start: 2021-03-05 | End: 2021-03-09

## 2021-03-05 RX ORDER — CYCLOBENZAPRINE HCL 10 MG
5 TABLET ORAL NIGHTLY PRN
COMMUNITY

## 2021-03-05 RX ORDER — CLOPIDOGREL BISULFATE 75 MG/1
75 TABLET ORAL DAILY
COMMUNITY

## 2021-03-05 RX ORDER — CLOPIDOGREL BISULFATE 75 MG/1
75 TABLET ORAL DAILY
Status: DISCONTINUED | OUTPATIENT
Start: 2021-03-06 | End: 2021-03-19 | Stop reason: HOSPADM

## 2021-03-05 RX ORDER — METFORMIN HYDROCHLORIDE 500 MG/1
1000 TABLET, EXTENDED RELEASE ORAL EVERY MORNING
COMMUNITY

## 2021-03-05 RX ORDER — VELPATASVIR AND SOFOSBUVIR 100; 400 MG/1; MG/1
1 TABLET, FILM COATED ORAL DAILY
Status: DISCONTINUED | OUTPATIENT
Start: 2021-03-06 | End: 2021-03-05

## 2021-03-05 RX ORDER — ALOGLIPTIN 25 MG/1
25 TABLET, FILM COATED ORAL DAILY
COMMUNITY

## 2021-03-05 RX ORDER — MV-MIN/VIT C/GLUT/LYSINE/HB124 250-12.5MG
4 TABLET,CHEWABLE ORAL DAILY
COMMUNITY

## 2021-03-05 RX ORDER — LIDOCAINE 50 MG/G
1 OINTMENT TOPICAL 3 TIMES DAILY PRN
COMMUNITY

## 2021-03-05 RX ORDER — ATORVASTATIN CALCIUM 40 MG/1
40 TABLET, FILM COATED ORAL NIGHTLY
COMMUNITY

## 2021-03-05 RX ORDER — ACETAMINOPHEN 325 MG/1
650 TABLET ORAL EVERY 6 HOURS PRN
Status: DISCONTINUED | OUTPATIENT
Start: 2021-03-05 | End: 2021-03-19 | Stop reason: HOSPADM

## 2021-03-05 RX ORDER — DOXYCYCLINE 100 MG/1
100 TABLET ORAL EVERY 12 HOURS
Status: COMPLETED | OUTPATIENT
Start: 2021-03-05 | End: 2021-03-10

## 2021-03-05 RX ORDER — TIOTROPIUM BROMIDE INHALATION SPRAY 3.12 UG/1
5 SPRAY, METERED RESPIRATORY (INHALATION) DAILY
COMMUNITY

## 2021-03-05 RX ORDER — ALBUTEROL SULFATE 90 UG/1
2 AEROSOL, METERED RESPIRATORY (INHALATION) EVERY 6 HOURS PRN
Status: DISCONTINUED | OUTPATIENT
Start: 2021-03-05 | End: 2021-03-19 | Stop reason: HOSPADM

## 2021-03-05 RX ORDER — GABAPENTIN 300 MG/1
600 CAPSULE ORAL
COMMUNITY

## 2021-03-05 RX ORDER — POLYETHYLENE GLYCOL 3350 17 G/17G
1 POWDER, FOR SOLUTION ORAL
Status: DISCONTINUED | OUTPATIENT
Start: 2021-03-05 | End: 2021-03-19 | Stop reason: HOSPADM

## 2021-03-05 RX ORDER — GLIMEPIRIDE 4 MG/1
2 TABLET ORAL EVERY MORNING
COMMUNITY

## 2021-03-05 RX ORDER — ACETAMINOPHEN 500 MG
1000 TABLET ORAL
COMMUNITY

## 2021-03-05 RX ORDER — AMOXICILLIN 250 MG
2 CAPSULE ORAL 2 TIMES DAILY
Status: DISCONTINUED | OUTPATIENT
Start: 2021-03-06 | End: 2021-03-19 | Stop reason: HOSPADM

## 2021-03-05 RX ORDER — VELPATASVIR AND SOFOSBUVIR 100; 400 MG/1; MG/1
1 TABLET, FILM COATED ORAL DAILY
COMMUNITY

## 2021-03-05 RX ORDER — BISACODYL 10 MG
10 SUPPOSITORY, RECTAL RECTAL
Status: DISCONTINUED | OUTPATIENT
Start: 2021-03-05 | End: 2021-03-19 | Stop reason: HOSPADM

## 2021-03-05 RX ORDER — DEXTROSE MONOHYDRATE 25 G/50ML
50 INJECTION, SOLUTION INTRAVENOUS
Status: DISCONTINUED | OUTPATIENT
Start: 2021-03-05 | End: 2021-03-07

## 2021-03-05 RX ORDER — AMLODIPINE BESYLATE 10 MG/1
10 TABLET ORAL DAILY
COMMUNITY

## 2021-03-05 RX ORDER — METHYLPREDNISOLONE SODIUM SUCCINATE 125 MG/2ML
125 INJECTION, POWDER, LYOPHILIZED, FOR SOLUTION INTRAMUSCULAR; INTRAVENOUS ONCE
Status: COMPLETED | OUTPATIENT
Start: 2021-03-05 | End: 2021-03-05

## 2021-03-05 RX ORDER — VALSARTAN 320 MG/1
320 TABLET ORAL
COMMUNITY

## 2021-03-05 RX ORDER — ONDANSETRON 2 MG/ML
4 INJECTION INTRAMUSCULAR; INTRAVENOUS EVERY 4 HOURS PRN
Status: DISCONTINUED | OUTPATIENT
Start: 2021-03-05 | End: 2021-03-19 | Stop reason: HOSPADM

## 2021-03-05 RX ORDER — ATORVASTATIN CALCIUM 40 MG/1
40 TABLET, FILM COATED ORAL NIGHTLY
Status: DISCONTINUED | OUTPATIENT
Start: 2021-03-05 | End: 2021-03-19 | Stop reason: HOSPADM

## 2021-03-05 RX ORDER — AMLODIPINE BESYLATE 10 MG/1
10 TABLET ORAL DAILY
Status: DISCONTINUED | OUTPATIENT
Start: 2021-03-06 | End: 2021-03-19 | Stop reason: HOSPADM

## 2021-03-05 RX ORDER — VALSARTAN 80 MG/1
320 TABLET ORAL
Status: DISCONTINUED | OUTPATIENT
Start: 2021-03-05 | End: 2021-03-19 | Stop reason: HOSPADM

## 2021-03-05 RX ORDER — GABAPENTIN 300 MG/1
600 CAPSULE ORAL
Status: DISCONTINUED | OUTPATIENT
Start: 2021-03-05 | End: 2021-03-19 | Stop reason: HOSPADM

## 2021-03-05 RX ORDER — ONDANSETRON 4 MG/1
4 TABLET, ORALLY DISINTEGRATING ORAL EVERY 4 HOURS PRN
Status: DISCONTINUED | OUTPATIENT
Start: 2021-03-05 | End: 2021-03-19 | Stop reason: HOSPADM

## 2021-03-05 RX ADMIN — ATORVASTATIN CALCIUM 40 MG: 40 TABLET, FILM COATED ORAL at 20:43

## 2021-03-05 RX ADMIN — METHYLPREDNISOLONE SODIUM SUCCINATE 125 MG: 125 INJECTION, POWDER, FOR SOLUTION INTRAMUSCULAR; INTRAVENOUS at 17:22

## 2021-03-05 RX ADMIN — ALBUTEROL SULFATE 2.5 MG: 2.5 SOLUTION RESPIRATORY (INHALATION) at 14:35

## 2021-03-05 RX ADMIN — IPRATROPIUM BROMIDE AND ALBUTEROL SULFATE 3 ML: .5; 2.5 SOLUTION RESPIRATORY (INHALATION) at 20:33

## 2021-03-05 RX ADMIN — INSULIN HUMAN 2 UNITS: 100 INJECTION, SOLUTION PARENTERAL at 20:59

## 2021-03-05 RX ADMIN — DOXYCYCLINE 100 MG: 100 TABLET, FILM COATED ORAL at 20:50

## 2021-03-05 RX ADMIN — VALSARTAN 320 MG: 80 TABLET, FILM COATED ORAL at 20:43

## 2021-03-05 RX ADMIN — IOHEXOL 100 ML: 350 INJECTION, SOLUTION INTRAVENOUS at 14:32

## 2021-03-05 RX ADMIN — IPRATROPIUM BROMIDE 0.5 MG: 0.5 SOLUTION RESPIRATORY (INHALATION) at 16:55

## 2021-03-05 RX ADMIN — ALBUTEROL SULFATE 15 MG/HR: 5 SOLUTION RESPIRATORY (INHALATION) at 16:56

## 2021-03-05 RX ADMIN — GABAPENTIN 600 MG: 300 CAPSULE ORAL at 20:43

## 2021-03-05 ASSESSMENT — ENCOUNTER SYMPTOMS
GASTROINTESTINAL NEGATIVE: 1
WEIGHT LOSS: 0
DIZZINESS: 0
SHORTNESS OF BREATH: 0
FEVER: 0
SORE THROAT: 0
DIAPHORESIS: 0
CONSTITUTIONAL NEGATIVE: 1
WEAKNESS: 0
DEPRESSION: 0
BRUISES/BLEEDS EASILY: 0
MYALGIAS: 0
ABDOMINAL PAIN: 0
MUSCULOSKELETAL NEGATIVE: 1
NAUSEA: 0
EYES NEGATIVE: 1
MEMORY LOSS: 1
SPUTUM PRODUCTION: 1
HEADACHES: 1
COUGH: 1
FOCAL WEAKNESS: 1
PALPITATIONS: 0
BLURRED VISION: 0
CHILLS: 0
VOMITING: 0

## 2021-03-05 ASSESSMENT — LIFESTYLE VARIABLES: SUBSTANCE_ABUSE: 0

## 2021-03-05 ASSESSMENT — PATIENT HEALTH QUESTIONNAIRE - PHQ9
2. FEELING DOWN, DEPRESSED, IRRITABLE, OR HOPELESS: NOT AT ALL
SUM OF ALL RESPONSES TO PHQ9 QUESTIONS 1 AND 2: 0
1. LITTLE INTEREST OR PLEASURE IN DOING THINGS: NOT AT ALL

## 2021-03-05 NOTE — ED NOTES
BIB EMS from home where pt was found down at home after having a GLF. approx down time 6 hours.  Hx of stroke with RT sided deficits.   Pt confused, disoriented. Found with a room air SAT in the 70's. Pt with c/o difficulty breathing.received albuterol en route. Mid 90's on 15L non-rebreather.  Bilateral breath sounds diminished.  Pt also very hypertensive on arrival.   Tenderness to RUQ, abd distended.  Unknown LOC, +blood thinners.

## 2021-03-05 NOTE — ED PROVIDER NOTES
ED Provider Note    This patient was cared for during the COVID-19 pandemic. History and physical exam may be limited/truncated by the inherent challenges of PPE and the need to decrease staff exposure to novel coronavirus. Some aspects of disease management may be different to protect staff and help slow the spread of disease. I verified that, if possible, the patient was wearing a mask and I was wearing appropriate PPE every time I encountered the patient.       CHIEF COMPLAINT  Chief Complaint   Patient presents with   • Trauma Green       HPI  Chris Bolden-Bridget is a 66 y.o. male who presents as a trauma green.  Patient has a history of a stroke and is on Plavix per EMS they report that he was found on the floor.  There was a wheelchair near the stove and there was a large dent in the stove where he hit his head.  He apparently must have been on the floor for no less than 6 hours according to the patient's significant other.  Unknown if he took his medications this morning.  Further history is limited secondary to patient's clinical condition        REVIEW OF SYSTEMS  positive for shortness of breath right upper quadrant pain, negative for fever. All other systems are negative.     PAST MEDICAL HISTORY       SOCIAL HISTORY  Social History     Tobacco Use   • Smoking status: Not on file   Substance and Sexual Activity   • Alcohol use: Not on file   • Drug use: Not on file   • Sexual activity: Not on file       SURGICAL HISTORY  patient denies any surgical history    CURRENT MEDICATIONS  Home Medications     Reviewed by Adriana Oconnell Out, T (Pharmacy Tech) on 03/05/21 at 6818  Med List Status: Complete   Medication Last Dose Status   Albuterol Sulfate 108 (90 Base) MCG/ACT AEROSOL POWDER, BREATH ACTIVATED 3/4/2021 Active   Alogliptin Benzoate 25 MG Tab 3/4/2021 Active   amLODIPine (NORVASC) 10 MG Tab 3/4/2021 Active   atorvastatin (LIPITOR) 40 MG Tab 3/4/2021 Active   clopidogrel (PLAVIX) 75 MG Tab  "3/4/2021 Active   cyclobenzaprine (FLEXERIL) 10 mg Tab 3/4/2021 Active   diclofenac sodium (VOLTAREN) 1 % Gel 3/4/2021 Active   gabapentin (NEURONTIN) 300 MG Cap 3/4/2021 Active   glimepiride (AMARYL) 4 MG Tab 3/4/2021 Active   lidocaine (XYLOCAINE) 5 % Ointment 3/4/2021 Active   metFORMIN ER (GLUCOPHAGE XR) 500 MG TABLET SR 24 HR 3/4/2021 Active   Sofosbuvir-Velpatasvir (EPCLUSA) 400-100 MG Tab 3/4/2021 Active   tiotropium (SPIRIVA RESPIMAT) 2.5 mcg/Act Aero Soln ranout Active   valsartan (DIOVAN) 320 MG tablet ranout Active                ALLERGIES  Allergies   Allergen Reactions   • Contrast Media With Iodine [Iodine] Unspecified     Unsure happened along time ago when he had a kidney stone he thinks the reaction was that he \"passed out\" and made him \"Sick\"        PHYSICAL EXAM  VITAL SIGNS: BP (!) 203/93   Pulse (!) 103   Temp 36.1 °C (96.9 °F)   Resp (!) 26   Ht 1.803 m (5' 11\")   Wt 97.5 kg (215 lb)   SpO2 98%   BMI 29.99 kg/m² .  Constitutional: Alert in moderate respiratory distress  HENT: Large hematoma on his left forehead, Bilateral external ears normal, Nose normal.   Eyes: Pupils are equal and reactive 3 mm, Conjunctiva normal, Non-icteric.   Neck: Normal range of motion, No tenderness, Supple, No stridor.   Cardiovascular: Regular rate and rhythm, no murmurs.   Thorax & Lungs: Decreased breath sounds bilaterally with diffuse wheezing  Abdomen: Bowel sounds normal, Soft, right upper quadrant tenderness, No masses, No peritoneal signs.  Skin: Warm, Dry, No erythema, No rash.   Musculoskeletal:  no major deformities noted.   Neurologic: Alert, GCS of 13.  Following all commands however appears to have contracture of his right upper extremity with slightly decreased sensation I suspect this is old  Psychiatric: Appropriate      DIAGNOSTIC STUDIES / PROCEDURES      EKG  Results for orders placed or performed during the hospital encounter of 03/05/21   EKG   Result Value Ref Range    Report       " [facility] Emergency Dept.    Test Date:  2021  Pt Name:    ISHAAN HUGHES               Department: ER  MRN:        [id number]                      Room:        14  Gender:     Male                         Technician: [id number]  :        [id number]                   Requested By:LINWOOD DOWD  Order #:    [id number]                    Reading MD: LINWOOD DOWD    Measurements  Intervals                                Axis  Rate:       101                          P:          46  IA:         132                          QRS:        54  QRSD:       90                           T:          70  QT:         368  QTc:        477    Interpretive Statements  SINUS TACHYCARDIA  LOW VOLTAGE IN FRONTAL LEADS  BORDERLINE PROLONGED QT INTERVAL  No previous ECG available for comparison  Impression: No evidence of acute ischemia  Electronically Signed On 3-5-2021 18:30:05 PST by LINWOOD DOWD           LABS  Labs Reviewed   CBC WITHOUT DIFFERENTIAL - Abnormal; Notable for the following components:       Result Value    WBC 16.3 (*)     MCHC 31.8 (*)     All other components within normal limits   PLATELET MAPPING WITH BASIC TEG - Abnormal; Notable for the following components:    Reaction Time Initial-R 4.4 (*)     All other components within normal limits   PROTHROMBIN TIME - Abnormal; Notable for the following components:    PT 11.8 (*)     INR 0.84 (*)     All other components within normal limits   COMP METABOLIC PANEL - Abnormal; Notable for the following components:    Glucose 230 (*)     Alkaline Phosphatase 104 (*)     All other components within normal limits    Narrative:     SPECIMEN IS A RECOLLECT   TROPONIN - Abnormal; Notable for the following components:    Troponin T 32 (*)     All other components within normal limits   PROBRAIN NATRIURETIC PEPTIDE, NT - Abnormal; Notable for the following components:    NT-proBNP 278 (*)     All other components within normal limits   APTT   COD (ADULT)    SARS-COV ANTIGEN ESTRADA    Narrative:     Have you been in close contact with a person who is suspected  or known to be positive for COVID-19 within the last 30 days  (e.g. last seen that person < 30 days ago)->Unknown   SARS-COV-2, PCR (IN-HOUSE)    Narrative:     Have you been in close contact with a person who is suspected  or known to be positive for COVID-19 within the last 30 days  (e.g. last seen that person < 30 days ago)->Unknown   ABO RH CONFIRM   CREATINE KINASE    Narrative:     SPECIMEN IS A RECOLLECT   DIAGNOSTIC ALCOHOL    Narrative:     SPECIMEN IS A RECOLLECT   ESTIMATED GFR    Narrative:     SPECIMEN IS A RECOLLECT   PROCALCITONIN   TROPONIN   BLOOD CULTURE   BLOOD CULTURE   CULTURE RESPIRATORY W/ GRM STN   INFLUENZA A/B BY PCR   MAGNESIUM   HEMOGLOBIN A1C       RADIOLOGY  CT-CHEST,ABDOMEN,PELVIS WITH   Final Result      1.  No acute abnormality within the chest, abdomen, or pelvis      2.  Hepatic steatosis      3.  Aortic and branch vessel atherosclerotic plaque      4.  Incidental bilateral renal cyst      No follow up imaging is recommended per consensus guidelines of the 2019 ACR Incidental Findings Committee for probably benign incidental simple appearing renal cystic lesion(s) based on imaging criteria.         CT-TSPINE W/O PLUS RECONS   Final Result      Mild wedge deformities of T12 and T2 have a chronic appearance.      Multilevel degenerative changes as above described.         CT-LSPINE W/O PLUS RECONS   Final Result      Mild wedge deformity of T12 has a chronic appearance.      Degenerative changes as above described, including facet arthropathy.      Dorsal displacement of the coccyx which is of indeterminate age and may be chronic.      Atherosclerotic plaque.      CT-HEAD W/O   Final Result      No acute intracranial abnormality is identified.      There are periventricular and subcortical white matter changes present.  This finding is nonspecific and could be from previous small  vessel ischemia, demyelination, or gliosis.      Hypodensity in the jaxon likely related to a prior lacunar infarct.      Encephalomalacia in the left occipital lobe is likely related to a prior infarct.      Left frontal scalp hematoma.      CT-CSPINE WITHOUT PLUS RECONS   Final Result      Multilevel degenerative changes as above described.      Minimal grade 1 anterolisthesis of C5 on C6.      Minimal loss of height of T2 has a chronic appearance.         DX-CHEST-LIMITED (1 VIEW)   Final Result      Mild pulmonary vascular congestion.      Atherosclerotic plaque.      Elevation of the right hemidiaphragm.             Medical records reviewed for continuity of care.  No recent records available    Differential Diagnosis includes but is not limited to: Head injury intracranial bleed, intra-abdominal injury, COPD exacerbation cardiac abnormality    COURSE & MEDICAL DECISION MAKING  Pertinent Labs & Imaging studies reviewed. (See chart for details)    This is a 66-year-old gentleman who arrives as a trauma green.  Initially concern was for intracranial bleed with a obvious hematoma to his left forehead and pain to his abdomen.  From a trauma standpoint he was evaluated and had scans of his body that were essentially negative for acute traumatic injury.    4:10 PM  Patient was reevaluated.  He is much more alert than when he first arrived.  His GCS now 15.  Imaging is reviewed and he does not have any evidence of intracranial injury or intra-abdominal injury.  He is primarily wheezing at this time.  He is on 3 L of oxygen and reports that he does not usually use oxygen.  He does not remember falling or hitting his head.  He will be given a DuoNeb and continuous albuterol and steroids to treat likely COPD exacerbation.    6:17 PM  After continuous nebulizer patient still has diffuse wheezing he is tight he is satting 89% on 4 L when he is usually not on any additional oxygen he says.  Given this I do think he requires  hospitalization for this hypoxia and increased shortness of breath.  Troponin, BNP and EKG will be obtained I spoke with Dr. Barkley who agrees to consult for hospitalization.  Patient will be hospitalized in guarded condition.    EKG does not show any evidence of ischemia.  BNP and troponin are both minimally elevated.  I do think this is most likely more COPD exacerbation.  He has been given steroids and albuterol to treat this.          FINAL IMPRESSION  1. Acute exacerbation of chronic obstructive pulmonary disease (COPD) (Formerly [facility])  REFERRAL TO PULMONARY REHAB   2. Closed head injury, initial encounter  REFERRAL TO PULMONARY REHAB   3. Drug-induced rash with eosinophilia and systemic symptoms  REFERRAL TO PULMONARY REHAB   4. Hypoxia  REFERRAL TO PULMONARY REHAB       2.   3.    This dictation has been creating using voice recognition software. The accuracy of the dictation is limited the abilities of the software.  I expect there may be some errors of grammar and possibly content. I made every attempt to manually correct the errors within my dictation. However errors related to this voice recognition software may still exist and should be interpreted within the appropriate context.      The note accurately reflects work and decisions made by me.  Marisela Moore M.D.  3/5/2021  6:30 PM

## 2021-03-06 PROBLEM — I10 ESSENTIAL HYPERTENSION: Status: ACTIVE | Noted: 2021-03-06

## 2021-03-06 PROBLEM — J96.01 ACUTE RESPIRATORY FAILURE WITH HYPOXIA (HCC): Status: ACTIVE | Noted: 2021-03-05

## 2021-03-06 LAB
ALBUMIN SERPL BCP-MCNC: 3.6 G/DL (ref 3.2–4.9)
ALBUMIN/GLOB SERPL: 1.2 G/DL
ALP SERPL-CCNC: 88 U/L (ref 30–99)
ALT SERPL-CCNC: 12 U/L (ref 2–50)
ANION GAP SERPL CALC-SCNC: 11 MMOL/L (ref 7–16)
AST SERPL-CCNC: 15 U/L (ref 12–45)
BASOPHILS # BLD AUTO: 0.1 % (ref 0–1.8)
BASOPHILS # BLD: 0.01 K/UL (ref 0–0.12)
BILIRUB SERPL-MCNC: 0.4 MG/DL (ref 0.1–1.5)
BUN SERPL-MCNC: 16 MG/DL (ref 8–22)
CALCIUM SERPL-MCNC: 8.8 MG/DL (ref 8.5–10.5)
CHLORIDE SERPL-SCNC: 94 MMOL/L (ref 96–112)
CHOLEST SERPL-MCNC: 109 MG/DL (ref 100–199)
CO2 SERPL-SCNC: 25 MMOL/L (ref 20–33)
CREAT SERPL-MCNC: 1.17 MG/DL (ref 0.5–1.4)
EOSINOPHIL # BLD AUTO: 0 K/UL (ref 0–0.51)
EOSINOPHIL NFR BLD: 0 % (ref 0–6.9)
ERYTHROCYTE [DISTWIDTH] IN BLOOD BY AUTOMATED COUNT: 47.4 FL (ref 35.9–50)
GLOBULIN SER CALC-MCNC: 3.1 G/DL (ref 1.9–3.5)
GLUCOSE BLD-MCNC: 294 MG/DL (ref 65–99)
GLUCOSE SERPL-MCNC: 336 MG/DL (ref 65–99)
HCT VFR BLD AUTO: 45 % (ref 42–52)
HDLC SERPL-MCNC: 42 MG/DL
HGB BLD-MCNC: 14.6 G/DL (ref 14–18)
IMM GRANULOCYTES # BLD AUTO: 0.04 K/UL (ref 0–0.11)
IMM GRANULOCYTES NFR BLD AUTO: 0.4 % (ref 0–0.9)
LDLC SERPL CALC-MCNC: 58 MG/DL
LYMPHOCYTES # BLD AUTO: 0.41 K/UL (ref 1–4.8)
LYMPHOCYTES NFR BLD: 3.9 % (ref 22–41)
MCH RBC QN AUTO: 29.9 PG (ref 27–33)
MCHC RBC AUTO-ENTMCNC: 32.4 G/DL (ref 33.7–35.3)
MCV RBC AUTO: 92 FL (ref 81.4–97.8)
MONOCYTES # BLD AUTO: 0.23 K/UL (ref 0–0.85)
MONOCYTES NFR BLD AUTO: 2.2 % (ref 0–13.4)
NEUTROPHILS # BLD AUTO: 9.78 K/UL (ref 1.82–7.42)
NEUTROPHILS NFR BLD: 93.4 % (ref 44–72)
NRBC # BLD AUTO: 0 K/UL
NRBC BLD-RTO: 0 /100 WBC
PLATELET # BLD AUTO: 236 K/UL (ref 164–446)
PMV BLD AUTO: 9.5 FL (ref 9–12.9)
POTASSIUM SERPL-SCNC: 3.8 MMOL/L (ref 3.6–5.5)
PROT SERPL-MCNC: 6.7 G/DL (ref 6–8.2)
RBC # BLD AUTO: 4.89 M/UL (ref 4.7–6.1)
SODIUM SERPL-SCNC: 130 MMOL/L (ref 135–145)
TRIGL SERPL-MCNC: 45 MG/DL (ref 0–149)
TROPONIN T SERPL-MCNC: 19 NG/L (ref 6–19)
TROPONIN T SERPL-MCNC: 21 NG/L (ref 6–19)
TROPONIN T SERPL-MCNC: 22 NG/L (ref 6–19)
TROPONIN T SERPL-MCNC: 22 NG/L (ref 6–19)
WBC # BLD AUTO: 10.5 K/UL (ref 4.8–10.8)

## 2021-03-06 PROCEDURE — 99233 SBSQ HOSP IP/OBS HIGH 50: CPT | Performed by: HOSPITALIST

## 2021-03-06 PROCEDURE — 700102 HCHG RX REV CODE 250 W/ 637 OVERRIDE(OP): Performed by: HOSPITALIST

## 2021-03-06 PROCEDURE — 84484 ASSAY OF TROPONIN QUANT: CPT

## 2021-03-06 PROCEDURE — 700101 HCHG RX REV CODE 250: Performed by: HOSPITALIST

## 2021-03-06 PROCEDURE — A9270 NON-COVERED ITEM OR SERVICE: HCPCS | Performed by: HOSPITALIST

## 2021-03-06 PROCEDURE — 700111 HCHG RX REV CODE 636 W/ 250 OVERRIDE (IP): Performed by: HOSPITALIST

## 2021-03-06 PROCEDURE — 80061 LIPID PANEL: CPT

## 2021-03-06 PROCEDURE — 94669 MECHANICAL CHEST WALL OSCILL: CPT

## 2021-03-06 PROCEDURE — 36415 COLL VENOUS BLD VENIPUNCTURE: CPT

## 2021-03-06 PROCEDURE — 97166 OT EVAL MOD COMPLEX 45 MIN: CPT

## 2021-03-06 PROCEDURE — 97535 SELF CARE MNGMENT TRAINING: CPT

## 2021-03-06 PROCEDURE — 85025 COMPLETE CBC W/AUTO DIFF WBC: CPT

## 2021-03-06 PROCEDURE — 80053 COMPREHEN METABOLIC PANEL: CPT

## 2021-03-06 PROCEDURE — 94640 AIRWAY INHALATION TREATMENT: CPT

## 2021-03-06 PROCEDURE — 94760 N-INVAS EAR/PLS OXIMETRY 1: CPT

## 2021-03-06 PROCEDURE — 97161 PT EVAL LOW COMPLEX 20 MIN: CPT

## 2021-03-06 PROCEDURE — 770020 HCHG ROOM/CARE - TELE (206)

## 2021-03-06 PROCEDURE — 82962 GLUCOSE BLOOD TEST: CPT | Mod: 91

## 2021-03-06 RX ORDER — LABETALOL HYDROCHLORIDE 5 MG/ML
10-20 INJECTION, SOLUTION INTRAVENOUS EVERY 6 HOURS PRN
Status: DISCONTINUED | OUTPATIENT
Start: 2021-03-06 | End: 2021-03-19 | Stop reason: HOSPADM

## 2021-03-06 RX ORDER — METHYLPREDNISOLONE SODIUM SUCCINATE 40 MG/ML
40 INJECTION, POWDER, LYOPHILIZED, FOR SOLUTION INTRAMUSCULAR; INTRAVENOUS EVERY 8 HOURS
Status: DISCONTINUED | OUTPATIENT
Start: 2021-03-06 | End: 2021-03-07

## 2021-03-06 RX ORDER — LIDOCAINE 50 MG/G
1 PATCH TOPICAL EVERY 24 HOURS
Status: DISCONTINUED | OUTPATIENT
Start: 2021-03-06 | End: 2021-03-19 | Stop reason: HOSPADM

## 2021-03-06 RX ORDER — OXYCODONE HYDROCHLORIDE 5 MG/1
5 TABLET ORAL EVERY 4 HOURS PRN
Status: DISCONTINUED | OUTPATIENT
Start: 2021-03-06 | End: 2021-03-19 | Stop reason: HOSPADM

## 2021-03-06 RX ORDER — HYDRALAZINE HYDROCHLORIDE 10 MG/1
10 TABLET, FILM COATED ORAL EVERY 8 HOURS
Status: DISCONTINUED | OUTPATIENT
Start: 2021-03-06 | End: 2021-03-08

## 2021-03-06 RX ADMIN — IPRATROPIUM BROMIDE AND ALBUTEROL SULFATE 3 ML: .5; 2.5 SOLUTION RESPIRATORY (INHALATION) at 23:20

## 2021-03-06 RX ADMIN — AMLODIPINE BESYLATE 10 MG: 10 TABLET ORAL at 06:39

## 2021-03-06 RX ADMIN — DOCUSATE SODIUM 50 MG AND SENNOSIDES 8.6 MG 2 TABLET: 8.6; 5 TABLET, FILM COATED ORAL at 16:25

## 2021-03-06 RX ADMIN — METHYLPREDNISOLONE SODIUM SUCCINATE 40 MG: 40 INJECTION, POWDER, FOR SOLUTION INTRAMUSCULAR; INTRAVENOUS at 22:42

## 2021-03-06 RX ADMIN — INSULIN HUMAN 2 UNITS: 100 INJECTION, SOLUTION PARENTERAL at 11:50

## 2021-03-06 RX ADMIN — IPRATROPIUM BROMIDE AND ALBUTEROL SULFATE 3 ML: .5; 2.5 SOLUTION RESPIRATORY (INHALATION) at 11:08

## 2021-03-06 RX ADMIN — SITAGLIPTIN 100 MG: 100 TABLET, FILM COATED ORAL at 08:24

## 2021-03-06 RX ADMIN — METHYLPREDNISOLONE SODIUM SUCCINATE 40 MG: 40 INJECTION, POWDER, FOR SOLUTION INTRAMUSCULAR; INTRAVENOUS at 13:42

## 2021-03-06 RX ADMIN — IPRATROPIUM BROMIDE AND ALBUTEROL SULFATE 3 ML: .5; 2.5 SOLUTION RESPIRATORY (INHALATION) at 14:57

## 2021-03-06 RX ADMIN — DOXYCYCLINE 100 MG: 100 TABLET, FILM COATED ORAL at 08:24

## 2021-03-06 RX ADMIN — LIDOCAINE 1 PATCH: 50 PATCH TOPICAL at 13:42

## 2021-03-06 RX ADMIN — INSULIN HUMAN 3 UNITS: 100 INJECTION, SOLUTION PARENTERAL at 20:36

## 2021-03-06 RX ADMIN — OXYCODONE 5 MG: 5 TABLET ORAL at 13:59

## 2021-03-06 RX ADMIN — HYDRALAZINE HYDROCHLORIDE 10 MG: 10 TABLET, FILM COATED ORAL at 22:42

## 2021-03-06 RX ADMIN — DOXYCYCLINE 100 MG: 100 TABLET, FILM COATED ORAL at 20:27

## 2021-03-06 RX ADMIN — VALSARTAN 320 MG: 80 TABLET, FILM COATED ORAL at 20:28

## 2021-03-06 RX ADMIN — IPRATROPIUM BROMIDE AND ALBUTEROL SULFATE 3 ML: .5; 2.5 SOLUTION RESPIRATORY (INHALATION) at 19:21

## 2021-03-06 RX ADMIN — ENOXAPARIN SODIUM 40 MG: 40 INJECTION SUBCUTANEOUS at 06:38

## 2021-03-06 RX ADMIN — HYDRALAZINE HYDROCHLORIDE 10 MG: 10 TABLET, FILM COATED ORAL at 13:43

## 2021-03-06 RX ADMIN — IPRATROPIUM BROMIDE AND ALBUTEROL SULFATE 3 ML: .5; 2.5 SOLUTION RESPIRATORY (INHALATION) at 07:35

## 2021-03-06 RX ADMIN — ATORVASTATIN CALCIUM 40 MG: 40 TABLET, FILM COATED ORAL at 20:27

## 2021-03-06 RX ADMIN — DOCUSATE SODIUM 50 MG AND SENNOSIDES 8.6 MG 2 TABLET: 8.6; 5 TABLET, FILM COATED ORAL at 06:35

## 2021-03-06 RX ADMIN — GABAPENTIN 600 MG: 300 CAPSULE ORAL at 20:27

## 2021-03-06 RX ADMIN — CLOPIDOGREL BISULFATE 75 MG: 75 TABLET ORAL at 06:38

## 2021-03-06 RX ADMIN — OXYCODONE 5 MG: 5 TABLET ORAL at 20:27

## 2021-03-06 RX ADMIN — ASPIRIN 81 MG: 81 TABLET, COATED ORAL at 06:00

## 2021-03-06 RX ADMIN — INSULIN HUMAN 2 UNITS: 100 INJECTION, SOLUTION PARENTERAL at 16:49

## 2021-03-06 ASSESSMENT — COGNITIVE AND FUNCTIONAL STATUS - GENERAL
SUGGESTED CMS G CODE MODIFIER MOBILITY: CL
WALKING IN HOSPITAL ROOM: TOTAL
PERSONAL GROOMING: A LITTLE
DRESSING REGULAR UPPER BODY CLOTHING: A LOT
DRESSING REGULAR LOWER BODY CLOTHING: A LOT
HELP NEEDED FOR BATHING: A LOT
CLIMB 3 TO 5 STEPS WITH RAILING: TOTAL
STANDING UP FROM CHAIR USING ARMS: A LOT
STANDING UP FROM CHAIR USING ARMS: A LOT
EATING MEALS: A LITTLE
CLIMB 3 TO 5 STEPS WITH RAILING: TOTAL
TURNING FROM BACK TO SIDE WHILE IN FLAT BAD: A LOT
MOBILITY SCORE: 9
MOVING TO AND FROM BED TO CHAIR: A LOT
SUGGESTED CMS G CODE MODIFIER DAILY ACTIVITY: CK
MOVING FROM LYING ON BACK TO SITTING ON SIDE OF FLAT BED: UNABLE
SUGGESTED CMS G CODE MODIFIER MOBILITY: CM
DAILY ACTIVITIY SCORE: 14
MOVING FROM LYING ON BACK TO SITTING ON SIDE OF FLAT BED: UNABLE
MOVING TO AND FROM BED TO CHAIR: A LOT
DRESSING REGULAR UPPER BODY CLOTHING: A LITTLE
TOILETING: A LOT
DRESSING REGULAR LOWER BODY CLOTHING: TOTAL
WALKING IN HOSPITAL ROOM: TOTAL
TOILETING: A LOT
HELP NEEDED FOR BATHING: A LOT
EATING MEALS: A LITTLE
SUGGESTED CMS G CODE MODIFIER DAILY ACTIVITY: CK
DAILY ACTIVITIY SCORE: 15
MOBILITY SCORE: 11

## 2021-03-06 ASSESSMENT — LIFESTYLE VARIABLES
HAVE PEOPLE ANNOYED YOU BY CRITICIZING YOUR DRINKING: NO
EVER HAD A DRINK FIRST THING IN THE MORNING TO STEADY YOUR NERVES TO GET RID OF A HANGOVER: NO
TOTAL SCORE: 0
HAVE YOU EVER FELT YOU SHOULD CUT DOWN ON YOUR DRINKING: NO
AVERAGE NUMBER OF DAYS PER WEEK YOU HAVE A DRINK CONTAINING ALCOHOL: 0
TOTAL SCORE: 0
SUBSTANCE_ABUSE: 0
ALCOHOL_USE: NO
ON A TYPICAL DAY WHEN YOU DRINK ALCOHOL HOW MANY DRINKS DO YOU HAVE: 0
EVER FELT BAD OR GUILTY ABOUT YOUR DRINKING: NO
DOES PATIENT WANT TO STOP DRINKING: NO
CONSUMPTION TOTAL: NEGATIVE
HOW MANY TIMES IN THE PAST YEAR HAVE YOU HAD 5 OR MORE DRINKS IN A DAY: 0
TOTAL SCORE: 0

## 2021-03-06 ASSESSMENT — ENCOUNTER SYMPTOMS
FOCAL WEAKNESS: 1
GASTROINTESTINAL NEGATIVE: 1
SORE THROAT: 0
DIZZINESS: 0
BRUISES/BLEEDS EASILY: 0
MEMORY LOSS: 1
MUSCULOSKELETAL NEGATIVE: 1
WEAKNESS: 0
BLURRED VISION: 0
VOMITING: 0
FEVER: 0
SPUTUM PRODUCTION: 1
DIAPHORESIS: 0
CONSTITUTIONAL NEGATIVE: 1
PALPITATIONS: 0
SHORTNESS OF BREATH: 0
MYALGIAS: 0
DEPRESSION: 0
CHILLS: 0
NAUSEA: 0
WEIGHT LOSS: 0
COUGH: 1
EYES NEGATIVE: 1
HEADACHES: 1
ABDOMINAL PAIN: 0

## 2021-03-06 ASSESSMENT — PAIN DESCRIPTION - PAIN TYPE
TYPE: ACUTE PAIN

## 2021-03-06 ASSESSMENT — COPD QUESTIONNAIRES
HAVE YOU SMOKED AT LEAST 100 CIGARETTES IN YOUR ENTIRE LIFE: YES
DO YOU EVER COUGH UP ANY MUCUS OR PHLEGM?: YES, A FEW DAYS A WEEK OR MONTH
COPD SCREENING SCORE: 5
DURING THE PAST 4 WEEKS HOW MUCH DID YOU FEEL SHORT OF BREATH: NONE/LITTLE OF THE TIME

## 2021-03-06 ASSESSMENT — GAIT ASSESSMENTS: GAIT LEVEL OF ASSIST: UNABLE TO PARTICIPATE

## 2021-03-06 ASSESSMENT — FIBROSIS 4 INDEX
FIB4 SCORE: 1.25
FIB4 SCORE: 1.25

## 2021-03-06 ASSESSMENT — ACTIVITIES OF DAILY LIVING (ADL): TOILETING: INDEPENDENT

## 2021-03-06 NOTE — ASSESSMENT & PLAN NOTE
Baseline unknown  Unclear if he had a concussion  CT head negative  [facility] requested  Neuro checks  Seizure precautions    SLP cognitive eval now recommends home health

## 2021-03-06 NOTE — PROGRESS NOTES
"Influenza, Adult  Influenza, more commonly known as \"the flu,\" is a viral infection that mainly affects the respiratory tract. The respiratory tract includes organs that help you breathe, such as the lungs, nose, and throat. The flu causes many symptoms similar to the common cold along with high fever and body aches.  The flu spreads easily from person to person (is contagious). Getting a flu shot (influenza vaccination) every year is the best way to prevent the flu.  What are the causes?  This condition is caused by the influenza virus. You can get the virus by:  · Breathing in droplets that are in the air from an infected person's cough or sneeze.  · Touching something that has been exposed to the virus (has been contaminated) and then touching your mouth, nose, or eyes.  What increases the risk?  The following factors may make you more likely to get the flu:  · Not washing or sanitizing your hands often.  · Having close contact with many people during cold and flu season.  · Touching your mouth, eyes, or nose without first washing or sanitizing your hands.  · Not getting a yearly (annual) flu shot.  You may have a higher risk for the flu, including serious problems such as a lung infection (pneumonia), if you:  · Are older than 65.  · Are pregnant.  · Have a weakened disease-fighting system (immune system). You may have a weakened immune system if you:  ? Have HIV or AIDS.  ? Are undergoing chemotherapy.  ? Are taking medicines that reduce (suppress) the activity of your immune system.  · Have a long-term (chronic) illness, such as heart disease, kidney disease, diabetes, or lung disease.  · Have a liver disorder.  · Are severely overweight (morbidly obese).  · Have anemia. This is a condition that affects your red blood cells.  · Have asthma.  What are the signs or symptoms?  Symptoms of this condition usually begin suddenly and last 4-14 days. They may include:  · Fever and chills.  · Headaches, body aches, or " Bedside report received from NOC RN. Assumed care of pt. Pt awake, laying in bed. A/Ox2, disoriented to time, event. VSS. Pt is on 4L O2 via NC at 94%. No concerns, complaints or distress. Pt educated to call before getting out of bed. POC reviewed and white board updated. Tele box on. SR 88on the monitor. Call light in reach. Bed locked in lowest position with 2 upper bed rails up. Bed alarm on.     muscle aches.  · Sore throat.  · Cough.  · Runny or stuffy (congested) nose.  · Chest discomfort.  · Poor appetite.  · Weakness or fatigue.  · Dizziness.  · Nausea or vomiting.  How is this diagnosed?  This condition may be diagnosed based on:  · Your symptoms and medical history.  · A physical exam.  · Swabbing your nose or throat and testing the fluid for the influenza virus.  How is this treated?  If the flu is diagnosed early, you can be treated with medicine that can help reduce how severe the illness is and how long it lasts (antiviral medicine). This may be given by mouth (orally) or through an IV.  Taking care of yourself at home can help relieve symptoms. Your health care provider may recommend:  · Taking over-the-counter medicines.  · Drinking plenty of fluids.  In many cases, the flu goes away on its own. If you have severe symptoms or complications, you may be treated in a hospital.  Follow these instructions at home:  Activity  · Rest as needed and get plenty of sleep.  · Stay home from work or school as told by your health care provider. Unless you are visiting your health care provider, avoid leaving home until your fever has been gone for 24 hours without taking medicine.  Eating and drinking  · Take an oral rehydration solution (ORS). This is a drink that is sold at pharmacies and retail stores.  · Drink enough fluid to keep your urine pale yellow.  · Drink clear fluids in small amounts as you are able. Clear fluids include water, ice chips, diluted fruit juice, and low-calorie sports drinks.  · Eat bland, easy-to-digest foods in small amounts as you are able. These foods include bananas, applesauce, rice, lean meats, toast, and crackers.  · Avoid drinking fluids that contain a lot of sugar or caffeine, such as energy drinks, regular sports drinks, and soda.  · Avoid alcohol.  · Avoid spicy or fatty foods.  General instructions         · Take over-the-counter and prescription medicines only as told  "by your health care provider.  · Use a cool mist humidifier to add humidity to the air in your home. This can make it easier to breathe.  · Cover your mouth and nose when you cough or sneeze.  · Wash your hands with soap and water often, especially after you cough or sneeze. If soap and water are not available, use alcohol-based hand .  · Keep all follow-up visits as told by your health care provider. This is important.  How is this prevented?    · Get an annual flu shot. You may get the flu shot in late summer, fall, or winter. Ask your health care provider when you should get your flu shot.  · Avoid contact with people who are sick during cold and flu season. This is generally fall and winter.  Contact a health care provider if:  · You develop new symptoms.  · You have:  ? Chest pain.  ? Diarrhea.  ? A fever.  · Your cough gets worse.  · You produce more mucus.  · You feel nauseous or you vomit.  Get help right away if:  · You develop shortness of breath or difficulty breathing.  · Your skin or nails turn a bluish color.  · You have severe pain or stiffness in your neck.  · You develop a sudden headache or sudden pain in your face or ear.  · You cannot eat or drink without vomiting.  Summary  · Influenza, more commonly known as \"the flu,\" is a viral infection that primarily affects your respiratory tract.  · Symptoms of the flu usually begin suddenly and last 4-14 days.  · Getting an annual flu shot is the best way to prevent getting the flu.  · Stay home from work or school as told by your health care provider. Unless you are visiting your health care provider, avoid leaving home until your fever has been gone for 24 hours without taking medicine.  · Keep all follow-up visits as told by your health care provider. This is important.  This information is not intended to replace advice given to you by your health care provider. Make sure you discuss any questions you have with your health care " provider.  Document Released: 12/15/2001 Document Revised: 03/19/2020 Document Reviewed: 06/05/2019  [employer] Patient Education © 2020 [employer]      INSTRUCTIONS FOR COVID-19 OR ANY OTHER INFECTIOUS RESPIRATORY ILLNESSES    The Centers for Disease Control and Prevention (CDC) states that early indications for COVID-19 include cough, shortness of breath, difficulty breathing, or at least two of the following symptoms: chills, shaking with chills, muscle pain, headache, sore throat, and loss of taste or smell. Symptoms can range from mild to severe and may appear up to two weeks after exposure to the virus.    The practice of self-isolation and quarantine helps protect the public and your family by  preventing exposure to people who have or may have a contagious disease. Please follow the prevention steps below as based on CDC guidelines:    WHEN TO STOP ISOLATION: Persons with COVID-19 or any other infectious respiratory illness who have symptoms and were advised to care for themselves at home may discontinue home isolation under the following conditions:  · At least 24 hours have passed since recovery defined as resolution of fever without the use of fever-reducing medications; AND,  · Improvement in respiratory symptoms (e.g., cough, shortness of breath); AND,  · At least 10 days have passed since symptoms first appeared and have had no subsequent illness.    MONITOR YOUR SYMPTOMS: If your illness is worsening, seek prompt medical attention. If you have a medical emergency and need to call 911, notify the dispatch personnel that you have, or are being evaluated for confirmed or suspected COVID-19 or another infectious respiratory illness. Wear a facemask if possible.    ACTIVITY RESTRICTION: restrict activities outside your home, except for getting medical care. Do not go to work, school, or public areas. Avoid using public transportation, ride-sharing, or taxis.    SCHEDULED MEDICAL APPOINTMENTS: Notify your  provider that you have, or are being evaluated for, confirmed or suspected COVID-19 or another infectious respiratory. This will help the healthcare provider’s office safely take care of you and keep other people from getting exposed or infected.    FACEMASKS, when to wear: Anytime you are away from your home or around other people or pets. If you are unable to wear one, maintain a minimum of 6 feet distancing from others.    LIVING ENVIRONMENT: Stay in a separate room from other people and pets. If possible, use a separate bathroom, have someone else care for your pets and avoid sharing household items. Any items used should be washed thoroughly with soap and water. Clean all “high-touch” surfaces every day. Use a household cleaning spray or wipe, according to the label instructions. High touch surfaces include (but are not limited to) counters, tabletops, doorknobs, bathroom fixtures, toilets, phones, keyboards, tablets, and bedside tables.     HAND WASHING: Frequently wash hands with soap and water for at least 20 seconds,  especially after blowing your nose, coughing, or sneezing; going to the bathroom; before and after interacting with pets; and before and after eating or preparing food. If hands are visibly dirty use soap and water. If soap and water are not available, use an alcohol-based hand  with at least 60% alcohol. Avoid touching your eyes, nose, and mouth with unwashed hands. Cover your coughs and sneezes with a tissue. Throw used tissues in a lined trash can. Immediately wash your hands.    ACTIVE/FACILITATED SELF-MONITORING: Follow instructions provided by your local health department or health professionals, as appropriate. When working with your local health department check their available hours.    [facility]   Phone Number   [place] [phone]   [facility] [phone]   [place] [address]   [place] [phone]     IF YOU HAVE CONFIRMED POSITIVE  COVID-19:    Those who have completely recovered from COVID-19 may have immune-boosting antibodies in their plasma--called “convalescent plasma”--that could be used to treat critically ill COVID19 patients.    [facility] is excited to begin working with [employer] on collecting convalescent plasma from  people who have recovered from COVID-19 as part of a program to treat patients infected with the virus. This FDA-approved “emergency investigational new drug” is a special blood product containing antibodies that may give patients an extra boost to fight the virus.    To be eligible to donate convalescent plasma, you must have a prior COVID-19 diagnosis documented by a laboratory test (or a positive test result for SARS-CoV-2 antibodies) and meet additional eligibility requirements.    If you are interested in donating convalescent plasma or have any additional questions, please contact the [facility] Convalescent Plasma  at [phone] or via e-mail at [email or web address].    COVID-19  COVID-19 is a respiratory infection that is caused by a virus called severe acute respiratory syndrome coronavirus 2 (SARS-CoV-2). The disease is also known as coronavirus disease or novel coronavirus. In some people, the virus may not cause any symptoms. In others, it may cause a serious infection. The infection can get worse quickly and can lead to complications, such as:  · Pneumonia, or infection of the lungs.  · Acute respiratory distress syndrome or ARDS. This is fluid build-up in the lungs.  · Acute respiratory failure. This is a condition in which there is not enough oxygen passing from the lungs to the body.  · Sepsis or septic shock. This is a serious bodily reaction to an infection.  · Blood clotting problems.  · Secondary infections due to bacteria or fungus.  The virus that causes COVID-19 is contagious. This means that it can spread from person to person through droplets from coughs and sneezes  (respiratory secretions).  What are the causes?  This illness is caused by a virus. You may catch the virus by:  · Breathing in droplets from an infected person's cough or sneeze.  · Touching something, like a table or a doorknob, that was exposed to the virus (contaminated) and then touching your mouth, nose, or eyes.  What increases the risk?  Risk for infection  You are more likely to be infected with this virus if you:  · Live in or travel to an area with a COVID-19 outbreak.  · Come in contact with a sick person who recently traveled to an area with a COVID-19 outbreak.  · Provide care for or live with a person who is infected with COVID-19.  Risk for serious illness  You are more likely to become seriously ill from the virus if you:  · Are 65 years of age or older.  · Have a long-term disease that lowers your body's ability to fight infection (immunocompromised).  · Live in a nursing home or long-term care facility.  · Have a long-term (chronic) disease such as:  ? Chronic lung disease, including chronic obstructive pulmonary disease or asthma  ? Heart disease.  ? Diabetes.  ? Chronic kidney disease.  ? Liver disease.  · Are obese.  What are the signs or symptoms?  Symptoms of this condition can range from mild to severe. Symptoms may appear any time from 2 to 14 days after being exposed to the virus. They include:  · A fever.  · A cough.  · Difficulty breathing.  · Chills.  · Muscle pains.  · A sore throat.  · Loss of taste or smell.  Some people may also have stomach problems, such as nausea, vomiting, or diarrhea.  Other people may not have any symptoms of COVID-19.  How is this diagnosed?  This condition may be diagnosed based on:  · Your signs and symptoms, especially if:  ? You live in an area with a COVID-19 outbreak.  ? You recently traveled to or from an area where the virus is common.  ? You provide care for or live with a person who was diagnosed with COVID-19.  · A physical exam.  · Lab tests,  which may include:  ? A nasal swab to take a sample of fluid from your nose.  ? A throat swab to take a sample of fluid from your throat.  ? A sample of mucus from your lungs (sputum).  ? Blood tests.  · Imaging tests, which may include, X-rays, CT scan, or ultrasound.  How is this treated?  At present, there is no medicine to treat COVID-19. Medicines that treat other diseases are being used on a trial basis to see if they are effective against COVID-19.  Your health care provider will talk with you about ways to treat your symptoms. For most people, the infection is mild and can be managed at home with rest, fluids, and over-the-counter medicines.  Treatment for a serious infection usually takes places in a hospital intensive care unit (ICU). It may include one or more of the following treatments. These treatments are given until your symptoms improve.  · Receiving fluids and medicines through an IV.  · Supplemental oxygen. Extra oxygen is given through a tube in the nose, a face mask, or a fiore.  · Positioning you to lie on your stomach (prone position). This makes it easier for oxygen to get into the lungs.  · Continuous positive airway pressure (CPAP) or bi-level positive airway pressure (BPAP) machine. This treatment uses mild air pressure to keep the airways open. A tube that is connected to a motor delivers oxygen to the body.  · Ventilator. This treatment moves air into and out of the lungs by using a tube that is placed in your windpipe.  · Tracheostomy. This is a procedure to create a hole in the neck so that a breathing tube can be inserted.  · Extracorporeal membrane oxygenation (ECMO). This procedure gives the lungs a chance to recover by taking over the functions of the heart and lungs. It supplies oxygen to the body and removes carbon dioxide.  Follow these instructions at home:  Lifestyle  · If you are sick, stay home except to get medical care. Your health care provider will tell you how long to  stay home. Call your health care provider before you go for medical care.  · Rest at home as told by your health care provider.  · Do not use any products that contain nicotine or tobacco, such as cigarettes, e-cigarettes, and chewing tobacco. If you need help quitting, ask your health care provider.  · Return to your normal activities as told by your health care provider. Ask your health care provider what activities are safe for you.  General instructions  · Take over-the-counter and prescription medicines only as told by your health care provider.  · Drink enough fluid to keep your urine pale yellow.  · Keep all follow-up visits as told by your health care provider. This is important.  How is this prevented?    There is no vaccine to help prevent COVID-19 infection. However, there are steps you can take to protect yourself and others from this virus.  To protect yourself:   · Do not travel to areas where COVID-19 is a risk. The areas where COVID-19 is reported change often. To identify high-risk areas and travel restrictions, check the CDC travel website: wwwnc.cdc.gov/travel/notices  · If you live in, or must travel to, an area where COVID-19 is a risk, take precautions to avoid infection.  ? Stay away from people who are sick.  ? Wash your hands often with soap and water for 20 seconds. If soap and water are not available, use an alcohol-based hand .  ? Avoid touching your mouth, face, eyes, or nose.  ? Avoid going out in public, follow guidance from your state and local health authorities.  ? If you must go out in public, wear a cloth face covering or face mask.  ? Disinfect objects and surfaces that are frequently touched every day. This may include:  § Counters and tables.  § Doorknobs and light switches.  § Sinks and faucets.  § Electronics, such as phones, remote controls, keyboards, computers, and tablets.  To protect others:  If you have symptoms of COVID-19, take steps to prevent the virus from  spreading to others.  · If you think you have a COVID-19 infection, contact your health care provider right away. Tell your health care team that you think you may have a COVID-19 infection.  · Stay home. Leave your house only to seek medical care. Do not use public transport.  · Do not travel while you are sick.  · Wash your hands often with soap and water for 20 seconds. If soap and water are not available, use alcohol-based hand .  · Stay away from other members of your household. Let healthy household members care for children and pets, if possible. If you have to care for children or pets, wash your hands often and wear a mask. If possible, stay in your own room, separate from others. Use a different bathroom.  · Make sure that all people in your household wash their hands well and often.  · Cough or sneeze into a tissue or your sleeve or elbow. Do not cough or sneeze into your hand or into the air.  · Wear a cloth face covering or face mask.  Where to find more information  · Centers for Disease Control and Prevention: www.cdc.gov/coronavirus/2019-ncov/index.html  · World Health Organization: www.who.int/health-topics/coronavirus  Contact a health care provider if:  · You live in or have traveled to an area where COVID-19 is a risk and you have symptoms of the infection.  · You have had contact with someone who has COVID-19 and you have symptoms of the infection.  Get help right away if:  · You have trouble breathing.  · You have pain or pressure in your chest.  · You have confusion.  · You have bluish lips and fingernails.  · You have difficulty waking from sleep.  · You have symptoms that get worse.  These symptoms may represent a serious problem that is an emergency. Do not wait to see if the symptoms will go away. Get medical help right away. Call your local emergency services (911 in the [place] Do not drive yourself to the hospital. Let the emergency medical personnel know if you think you have  COVID-19.  Summary  · COVID-19 is a respiratory infection that is caused by a virus. It is also known as coronavirus disease or novel coronavirus. It can cause serious infections, such as pneumonia, acute respiratory distress syndrome, acute respiratory failure, or sepsis.  · The virus that causes COVID-19 is contagious. This means that it can spread from person to person through droplets from coughs and sneezes.  · You are more likely to develop a serious illness if you are 65 years of age or older, have a weak immunity, live in a nursing home, or have chronic disease.  · There is no medicine to treat COVID-19. Your health care provider will talk with you about ways to treat your symptoms.  · Take steps to protect yourself and others from infection. Wash your hands often and disinfect objects and surfaces that are frequently touched every day. Stay away from people who are sick and wear a mask if you are sick.  This information is not intended to replace advice given to you by your health care provider. Make sure you discuss any questions you have with your health care provider.  Document Released: 01/23/2020 Document Revised: 05/14/2020 Document Reviewed: 01/23/2020  [employer] Patient Education © 2020 [employer]

## 2021-03-06 NOTE — ASSESSMENT & PLAN NOTE
Acute on chronic exacerbation  With suspected pneumonitis -completed antibiotics  Not on o2 at baseline    Acute exacerbation resolved.  RT protocol

## 2021-03-06 NOTE — PROGRESS NOTES
2 RN skin check completed by Lia RN, Brisa RN  Devices in place: IV.  Skin assessed under devices: yes  Confirmed pressure ulcers found: no  Wound consult placed: no  The following interventions in place: pillows in place under heels and for support

## 2021-03-06 NOTE — ASSESSMENT & PLAN NOTE
Likely 2/2 steroids completed 3/10  Completed doxycycline for COPD exacerbation  Continue to monitor  Negative procalcitonin

## 2021-03-06 NOTE — ASSESSMENT & PLAN NOTE
Lab Results   Component Value Date/Time    HBA1C 7.8 (H) 03/05/2021 2055     Results from last 7 days   Lab Units 03/18/21  1058 03/18/21  0607 03/17/21 2023 03/17/21  1638 03/17/21  1138 03/17/21  0547 03/16/21 2030 03/16/21  1631   ACCU CHECK GLUCOSE 788 mg/dL 167* 164* 188* 131* 189* 163* 197* 199*     I have ordered insulin sliding scale with D50 and glucagon for hypoglycemia per protocol.  Diabetic diet  Diabetic education

## 2021-03-06 NOTE — ASSESSMENT & PLAN NOTE
Evidence per [facility]  Found on ground with abrasion on head with stove in front of him dented  Suspect occurred during syncopal event  CT with no acute abnormalities

## 2021-03-06 NOTE — CONSULTS
Reason for PC Consult: Advance Care Planning    Consulted by:   Dr. Barkley    Assessment:  General:   66 year old male admitted for LOC on 3/5/21. 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 Pt was found down in his kitchen after possibly 6 hours. Pt sustained head trauma as evidenced by a dent in the stove door next to where he was found.     Social:   Pt lives alone, he is not  nor has children. Pt has a  [facility]. Pt has been using food delivery services for meals as he has been having issues making meals.    Dyspnea: No, 96% on 4L NC  Last BM: (PTA)    Pain: No    Depression: No    Dementia: No       Spiritual:  Is Latter-day or spirituality important for coping with this illness? No   Has a  or spiritual provider visit been requested? No    Palliative Performance Scale: 60%    Advance Directive: None on File   DPOA: None on File   POLST: None on File    To locate the AD/POLST, please hover the cursor over the patient's code status to find all linked ACP documents.    Code Status: Full     Outcome:  Sent fax request for AD/POLST to the [facility].     PC RN visited pt at bedside, introduced self and role of PC. Discussed pt's understanding of clinical picture, pt verbalized not remembering passing out but knows that it happened and he is at [facility] now. Pt states he had a stroke 10 years ago and since then he has been right sided paralyzed. He is able to perform ADLs independently, he has adjusted his lifestyle to his needs and abilities.     Discussed pt's GOC, pt expressed wanting to get rehab and therapies if possible. Pt states he is comfortable with the support he has at home and [facility].    Discussed code status, AD and POLST. Pt states [facility] a long [facility], he believes he named his sister as PERNELL. She currently lives in [facility]  [place] he does not have her phone number. Discussed resuscitation, pt verbalized wanting to be DNAR/DNI, he does not want his life prolonged if \"it's my time\". Reviewed POLST, pr verbalized agreement to complete POLST for DNR, selective treatment only, and trial period of TF for no longer than 1 week.  POLST completed, will scan copy into EMR.     Left business card on bedside table, encouraged pt to call with any questions or concerns.     Active listening, education, validation, normalization, therapeutic touch, and emotional support provided throughout encounter.    Updated:   Dr. Nguyen    Plan:   DNAR/DNI, POLST completed. [facility]. Pt would like post acute rehab.     Recommendations: I do not recommend an ethics or hospice consult at this time because pt wishes to have post acute rehab and unknown terminal diagnosis.    *Recommendation does not provide clinical appropriateness for hospice nor does it provide that the patient would or would not qualify for hospice services.*     Thank you for allowing Palliative Care to participate in this patient's care. Please feel free to call [phone] with any questions or concerns.  "

## 2021-03-06 NOTE — RESPIRATORY CARE
COPD Team Note  Pt difficult to engage in conversation at this time and is a poor historian. Discussed with RN. Palliative consulted. Our team will revisit

## 2021-03-06 NOTE — PROGRESS NOTES
Report received from Lia BOURNE. Pt is resting in bed, A&O2, with no complaints of pain, and is on RA. Tele box on. All fall precautions are in place, belongings at bedside table.  Pt was updated on POC, no questions or concerns. Pt educated on use of call light for assistance. Will continue to monitor.

## 2021-03-06 NOTE — H&P
"Hospital Medicine History & Physical Note    Date of Service  3/5/2021    Primary Care Physician  No primary care provider on file.    Consultants      Code Status  Full Code    Chief Complaint  Chief Complaint   Patient presents with   • Trauma Green       History of Presenting Illness  Patient is a pleasant 66 year old male with history of previous stroke, DM, right sided weakness and COPD who presented to the ER on 3/5/2021. He is a very difficult historian, [facility].  He is unsure why he is in the hospital.  Apparently a friend found him passed out in front of his stove with a dent in the stove and evidence of head trauma, it was believed that he had been down for several hours.  He was also found to be hypoxic 70% on RA.  Pt tells me \"I have some breathing problems but I feel okay right now\"    He was initially on 15 NRB, now 92% on 4L, reportedly not usually on oxygen.  He reports right sided rib pain and head pain at the site of the bruise on his forehead.  He reports a chronic cough.  ROS otherwise negative.    CT of head wnl, ekg has not yet been done    Review of Systems  Review of Systems   Constitutional: Negative.  Negative for chills, diaphoresis, fever, malaise/fatigue and weight loss.   HENT: Negative.  Negative for sore throat.    Eyes: Negative.  Negative for blurred vision.   Respiratory: Positive for cough and sputum production. Negative for shortness of breath.    Cardiovascular: Positive for chest pain (right sided chest wall). Negative for palpitations and leg swelling.   Gastrointestinal: Negative.  Negative for abdominal pain, nausea and vomiting.   Genitourinary: Negative.  Negative for dysuria.   Musculoskeletal: Negative.  Negative for myalgias.   Skin: Negative.  Negative for itching and rash.   Neurological: Positive for focal weakness (r sided, per pt stable x 10 years) and headaches. Negative for dizziness and weakness.   Endo/Heme/Allergies: Negative.  Does not " "bruise/bleed easily.   Psychiatric/Behavioral: Positive for memory loss. Negative for depression, substance abuse and suicidal ideas.   All other systems reviewed and are negative.      Past Medical History   has no past medical history on file. copd, cva, chronic right sided weakness, dm    Surgical History   has no past surgical history on file. none    Family History  family history is not on file.     Social History   80 pack year smoking history currently 1ppd, denies etoh or drug use    Allergies  Allergies   Allergen Reactions   • Contrast Media With Iodine [Iodine] Unspecified     Unsure happened along time ago when he had a kidney stone he thinks the reaction was that he \"passed out\" and made him \"Sick\"        Medications  None       Physical Exam  Temp:  [36.1 °C (96.9 °F)] 36.1 °C (96.9 °F)  Pulse:  [] 103  Resp:  [11-35] 12  BP: (144-221)/() 166/79  SpO2:  [93 %-100 %] 94 %    Physical Exam  Vitals and nursing note reviewed. Exam conducted with a chaperone present.   Constitutional:       General: He is not in acute distress.     Appearance: Normal appearance. He is not diaphoretic.      Comments: Unkempt  Speaking in full sentences with out difficulty   HENT:      Head:      Comments: Bruise to left forehead     Nose: Nose normal.      Mouth/Throat:      Mouth: Mucous membranes are moist.   Eyes:      Pupils: Pupils are equal, round, and reactive to light.   Cardiovascular:      Rate and Rhythm: Normal rate and regular rhythm.      Pulses: Normal pulses.      Heart sounds: Normal heart sounds.   Pulmonary:      Effort: Pulmonary effort is normal.      Breath sounds: Wheezing and rhonchi present.      Comments: Diminished throughout with exp wheezing  Few upper rhonchi that clear with cough  Chest:      Chest wall: Tenderness (to palpation, right chest, no s/o trauma) present.   Abdominal:      General: Abdomen is flat. Bowel sounds are normal.      Palpations: Abdomen is soft. "   Musculoskeletal:         General: No swelling or deformity. Normal range of motion.   Skin:     General: Skin is warm and dry.      Capillary Refill: Capillary refill takes less than 2 seconds.      Findings: Bruising present.   Neurological:      Mental Status: He is alert.      Cranial Nerves: No cranial nerve deficit.      Motor: Weakness (right sided hemiparesis, chronic per pt) present.      Comments: Oriented to person, [facility], and to year   Psychiatric:         Mood and Affect: Mood normal.         Behavior: Behavior normal.         Laboratory:  Recent Labs     03/05/21  1359   WBC 16.3*   RBC 5.48   HEMOGLOBIN 16.4   HEMATOCRIT 51.6   MCV 94.2   MCH 29.9   MCHC 31.8*   RDW 48.9   PLATELETCT 247   MPV 9.6     Recent Labs     03/05/21  1600   SODIUM 139   POTASSIUM 4.1   CHLORIDE 99   CO2 30   GLUCOSE 230*   BUN 10   CREATININE 1.02   CALCIUM 8.7     Recent Labs     03/05/21  1600   ALTSGPT 15   ASTSGOT 20   ALKPHOSPHAT 104*   TBILIRUBIN 0.4   GLUCOSE 230*     Recent Labs     03/05/21  1359   APTT 30.4   INR 0.84*     Recent Labs     03/05/21  1600   NTPROBNP 278*         Recent Labs     03/05/21  1600   TROPONINT 32*       Imaging:  CT-CHEST,ABDOMEN,PELVIS WITH   Final Result      1.  No acute abnormality within the chest, abdomen, or pelvis      2.  Hepatic steatosis      3.  Aortic and branch vessel atherosclerotic plaque      4.  Incidental bilateral renal cyst      No follow up imaging is recommended per consensus guidelines of the 2019 ACR Incidental Findings Committee for probably benign incidental simple appearing renal cystic lesion(s) based on imaging criteria.         CT-TSPINE W/O PLUS RECONS   Final Result      Mild wedge deformities of T12 and T2 have a chronic appearance.      Multilevel degenerative changes as above described.         CT-LSPINE W/O PLUS RECONS   Final Result      Mild wedge deformity of T12 has a chronic appearance.      Degenerative changes as above  described, including facet arthropathy.      Dorsal displacement of the coccyx which is of indeterminate age and may be chronic.      Atherosclerotic plaque.      CT-HEAD W/O   Final Result      No acute intracranial abnormality is identified.      There are periventricular and subcortical white matter changes present.  This finding is nonspecific and could be from previous small vessel ischemia, demyelination, or gliosis.      Hypodensity in the jaxon likely related to a prior lacunar infarct.      Encephalomalacia in the left occipital lobe is likely related to a prior infarct.      Left frontal scalp hematoma.      CT-CSPINE WITHOUT PLUS RECONS   Final Result      Multilevel degenerative changes as above described.      Minimal grade 1 anterolisthesis of C5 on C6.      Minimal loss of height of T2 has a chronic appearance.         DX-CHEST-LIMITED (1 VIEW)   Final Result      Mild pulmonary vascular congestion.      Atherosclerotic plaque.      Elevation of the right hemidiaphragm.               Assessment/Plan:  I anticipate this patient will require at least two midnights for appropriate medical management, necessitating inpatient admission.    Weakness  Assessment & Plan  Acute on chronic  Wheelchair bound at baseline  Chronic r hemiparesis    Encephalopathy  Assessment & Plan  Baseline unknown  Unclear if he had a concussion  CT negative  [facility]  Neuro checks  Seizure precautions  following    DM (diabetes mellitus) ([facility])  Assessment & Plan  With hyperglycemia  Will check a1c  Hold oral meds  SSI with hypoglycemic protocol  Diabetic diet    Closed head injury  Assessment & Plan  Evidence per [facility]  Found on ground with abrasion on head with stove in front of him dented  Suspect occurred during syncopal event  CT with no acute abnormalities    Leukocytosis  Assessment & Plan  Etiology unclear  No obvious infection  Will check pro calcitonin  Will check urine and blood cultures  Stress rxn could be  contributing    Syncope  Assessment & Plan  With LOC  Hypoxia likely contributed  eval for cardiac etiology  Tele  Serial trops    Hypoxia  Assessment & Plan  Due to copd exacerbation  covid negative    Acute exacerbation of chronic obstructive pulmonary disease (COPD) (HCC)  Assessment & Plan  Acute on chronic exacerbation  With suspected pneumonitis - doxy started  Not on o2 at baseline  Continue steroids and respiratory protocol

## 2021-03-06 NOTE — ED NOTES
"Med Rec completed: per home pharmacy ([facility]) and patient at bedside  Preferred Pharmacy: [facility]  Allergies   Allergen Reactions   • Contrast Media With Iodine [Iodine] Unspecified     Unsure happened along time ago when he had a kidney stone he thinks the reaction was that he \"passed out\" and made him \"Sick\"        No ORAL antibiotics in last 14 days    Medication Sig Comments   • cyclobenzaprine (FLEXERIL) 10 mg Tab Take 5 mg by mouth at bedtime as needed. 1/2 tablet = 5 mg    • tiotropium (SPIRIVA RESPIMAT) 2.5 mcg/Act Aero Soln Inhale 5 mcg every day. 2 puffs = 5 mcg    • glimepiride (AMARYL) 4 MG Tab Take 2 mg by mouth every morning. 1/2 tablet = 2 mg    • gabapentin (NEURONTIN) 300 MG Cap Take 600 mg by mouth every bedtime. 2 caps = 600 mg    • Alogliptin Benzoate 25 MG Tab Take 25 mg by mouth every day.    • metFORMIN ER (GLUCOPHAGE XR) 500 MG TABLET SR 24 HR Take 1,000 mg by mouth every morning. 2 tablets = 1000 mg     • amLODIPine (NORVASC) 10 MG Tab Take 10 mg by mouth every day.    • lidocaine (XYLOCAINE) 5 % Ointment Apply 1 Each topically 3 times a day as needed.    • valsartan (DIOVAN) 320 MG tablet Take 320 mg by mouth every bedtime.    • atorvastatin (LIPITOR) 40 MG Tab Take 40 mg by mouth every evening.    • diclofenac sodium (VOLTAREN) 1 % Gel Apply 1 Application topically as needed.    • clopidogrel (PLAVIX) 75 MG Tab Take 75 mg by mouth every day.    • Albuterol Sulfate 108 (90 Base) MCG/ACT AEROSOL POWDER, BREATH ACTIVATED Inhale 2 Puffs every 6 hours as needed (SOB).    • Sofosbuvir-Velpatasvir (EPCLUSA) 400-100 MG Tab Take 1 tablet by mouth every day.    • Multiple Vitamins-Minerals (AIRBORNE GUMMIES) Chew Tab Chew 4 Tablets every day.    • acetaminophen (TYLENOL) 500 MG Tab Take 1,000 mg by mouth every bedtime.          "

## 2021-03-07 ENCOUNTER — APPOINTMENT (OUTPATIENT)
Dept: CARDIOLOGY | Facility: MEDICAL CENTER | Age: 66
DRG: 190 | End: 2021-03-07
Attending: HOSPITALIST
Payer: MEDICARE

## 2021-03-07 LAB
ALBUMIN SERPL BCP-MCNC: 3.8 G/DL (ref 3.2–4.9)
ALBUMIN/GLOB SERPL: 1.2 G/DL
ALP SERPL-CCNC: 84 U/L (ref 30–99)
ALT SERPL-CCNC: 13 U/L (ref 2–50)
ANION GAP SERPL CALC-SCNC: 11 MMOL/L (ref 7–16)
AST SERPL-CCNC: 10 U/L (ref 12–45)
BASOPHILS # BLD AUTO: 0.1 % (ref 0–1.8)
BASOPHILS # BLD: 0.01 K/UL (ref 0–0.12)
BILIRUB SERPL-MCNC: 0.2 MG/DL (ref 0.1–1.5)
BUN SERPL-MCNC: 21 MG/DL (ref 8–22)
CALCIUM SERPL-MCNC: 8.9 MG/DL (ref 8.5–10.5)
CHLORIDE SERPL-SCNC: 98 MMOL/L (ref 96–112)
CO2 SERPL-SCNC: 25 MMOL/L (ref 20–33)
CREAT SERPL-MCNC: 0.92 MG/DL (ref 0.5–1.4)
EOSINOPHIL # BLD AUTO: 0 K/UL (ref 0–0.51)
EOSINOPHIL NFR BLD: 0 % (ref 0–6.9)
ERYTHROCYTE [DISTWIDTH] IN BLOOD BY AUTOMATED COUNT: 46.9 FL (ref 35.9–50)
GLOBULIN SER CALC-MCNC: 3.1 G/DL (ref 1.9–3.5)
GLUCOSE BLD-MCNC: 186 MG/DL (ref 65–99)
GLUCOSE BLD-MCNC: 214 MG/DL (ref 65–99)
GLUCOSE BLD-MCNC: 222 MG/DL (ref 65–99)
GLUCOSE BLD-MCNC: 229 MG/DL (ref 65–99)
GLUCOSE BLD-MCNC: 245 MG/DL (ref 65–99)
GLUCOSE BLD-MCNC: 252 MG/DL (ref 65–99)
GLUCOSE SERPL-MCNC: 255 MG/DL (ref 65–99)
HCT VFR BLD AUTO: 44.5 % (ref 42–52)
HGB BLD-MCNC: 14.4 G/DL (ref 14–18)
IMM GRANULOCYTES # BLD AUTO: 0.09 K/UL (ref 0–0.11)
IMM GRANULOCYTES NFR BLD AUTO: 0.6 % (ref 0–0.9)
LV EJECT FRACT  99904: 55
LV EJECT FRACT MOD 2C 99903: 56.97
LV EJECT FRACT MOD 4C 99902: 53.88
LV EJECT FRACT MOD BP 99901: 56.57
LYMPHOCYTES # BLD AUTO: 0.67 K/UL (ref 1–4.8)
LYMPHOCYTES NFR BLD: 4.4 % (ref 22–41)
MAGNESIUM SERPL-MCNC: 2.2 MG/DL (ref 1.5–2.5)
MCH RBC QN AUTO: 29.4 PG (ref 27–33)
MCHC RBC AUTO-ENTMCNC: 32.4 G/DL (ref 33.7–35.3)
MCV RBC AUTO: 90.8 FL (ref 81.4–97.8)
MONOCYTES # BLD AUTO: 0.52 K/UL (ref 0–0.85)
MONOCYTES NFR BLD AUTO: 3.4 % (ref 0–13.4)
NEUTROPHILS # BLD AUTO: 14.1 K/UL (ref 1.82–7.42)
NEUTROPHILS NFR BLD: 91.5 % (ref 44–72)
NRBC # BLD AUTO: 0 K/UL
NRBC BLD-RTO: 0 /100 WBC
PHOSPHATE SERPL-MCNC: 2.6 MG/DL (ref 2.5–4.5)
PLATELET # BLD AUTO: 274 K/UL (ref 164–446)
PMV BLD AUTO: 9.2 FL (ref 9–12.9)
POTASSIUM SERPL-SCNC: 4.1 MMOL/L (ref 3.6–5.5)
PROT SERPL-MCNC: 6.9 G/DL (ref 6–8.2)
RBC # BLD AUTO: 4.9 M/UL (ref 4.7–6.1)
SODIUM SERPL-SCNC: 134 MMOL/L (ref 135–145)
TROPONIN T SERPL-MCNC: 14 NG/L (ref 6–19)
TROPONIN T SERPL-MCNC: 15 NG/L (ref 6–19)
TROPONIN T SERPL-MCNC: 15 NG/L (ref 6–19)
TROPONIN T SERPL-MCNC: 18 NG/L (ref 6–19)
WBC # BLD AUTO: 15.4 K/UL (ref 4.8–10.8)

## 2021-03-07 PROCEDURE — 94760 N-INVAS EAR/PLS OXIMETRY 1: CPT

## 2021-03-07 PROCEDURE — 84100 ASSAY OF PHOSPHORUS: CPT

## 2021-03-07 PROCEDURE — 94640 AIRWAY INHALATION TREATMENT: CPT

## 2021-03-07 PROCEDURE — 700101 HCHG RX REV CODE 250: Performed by: HOSPITALIST

## 2021-03-07 PROCEDURE — 85025 COMPLETE CBC W/AUTO DIFF WBC: CPT

## 2021-03-07 PROCEDURE — 770006 HCHG ROOM/CARE - MED/SURG/GYN SEMI*

## 2021-03-07 PROCEDURE — 94669 MECHANICAL CHEST WALL OSCILL: CPT

## 2021-03-07 PROCEDURE — 700111 HCHG RX REV CODE 636 W/ 250 OVERRIDE (IP): Performed by: HOSPITALIST

## 2021-03-07 PROCEDURE — 83735 ASSAY OF MAGNESIUM: CPT

## 2021-03-07 PROCEDURE — 82962 GLUCOSE BLOOD TEST: CPT | Mod: 91

## 2021-03-07 PROCEDURE — 99232 SBSQ HOSP IP/OBS MODERATE 35: CPT | Performed by: HOSPITALIST

## 2021-03-07 PROCEDURE — A9270 NON-COVERED ITEM OR SERVICE: HCPCS | Performed by: HOSPITALIST

## 2021-03-07 PROCEDURE — 80053 COMPREHEN METABOLIC PANEL: CPT

## 2021-03-07 PROCEDURE — 700102 HCHG RX REV CODE 250 W/ 637 OVERRIDE(OP): Performed by: HOSPITALIST

## 2021-03-07 PROCEDURE — 84484 ASSAY OF TROPONIN QUANT: CPT | Mod: 91

## 2021-03-07 PROCEDURE — 93306 TTE W/DOPPLER COMPLETE: CPT

## 2021-03-07 PROCEDURE — 36415 COLL VENOUS BLD VENIPUNCTURE: CPT

## 2021-03-07 PROCEDURE — 93306 TTE W/DOPPLER COMPLETE: CPT | Mod: 26 | Performed by: INTERNAL MEDICINE

## 2021-03-07 RX ORDER — METHYLPREDNISOLONE SODIUM SUCCINATE 40 MG/ML
40 INJECTION, POWDER, LYOPHILIZED, FOR SOLUTION INTRAMUSCULAR; INTRAVENOUS EVERY 12 HOURS
Status: DISCONTINUED | OUTPATIENT
Start: 2021-03-07 | End: 2021-03-08

## 2021-03-07 RX ORDER — DEXTROSE MONOHYDRATE 25 G/50ML
50 INJECTION, SOLUTION INTRAVENOUS
Status: DISCONTINUED | OUTPATIENT
Start: 2021-03-07 | End: 2021-03-19 | Stop reason: HOSPADM

## 2021-03-07 RX ORDER — KETOROLAC TROMETHAMINE 30 MG/ML
15 INJECTION, SOLUTION INTRAMUSCULAR; INTRAVENOUS EVERY 6 HOURS PRN
Status: DISPENSED | OUTPATIENT
Start: 2021-03-07 | End: 2021-03-12

## 2021-03-07 RX ORDER — OMEPRAZOLE 20 MG/1
20 CAPSULE, DELAYED RELEASE ORAL DAILY
Status: DISCONTINUED | OUTPATIENT
Start: 2021-03-07 | End: 2021-03-07

## 2021-03-07 RX ORDER — OMEPRAZOLE 20 MG/1
40 CAPSULE, DELAYED RELEASE ORAL DAILY
Status: DISCONTINUED | OUTPATIENT
Start: 2021-03-08 | End: 2021-03-19 | Stop reason: HOSPADM

## 2021-03-07 RX ADMIN — HYDRALAZINE HYDROCHLORIDE 10 MG: 10 TABLET, FILM COATED ORAL at 05:46

## 2021-03-07 RX ADMIN — IPRATROPIUM BROMIDE AND ALBUTEROL SULFATE 3 ML: .5; 2.5 SOLUTION RESPIRATORY (INHALATION) at 23:34

## 2021-03-07 RX ADMIN — INSULIN HUMAN 3 UNITS: 100 INJECTION, SOLUTION PARENTERAL at 19:59

## 2021-03-07 RX ADMIN — DOXYCYCLINE 100 MG: 100 TABLET, FILM COATED ORAL at 09:38

## 2021-03-07 RX ADMIN — LABETALOL HYDROCHLORIDE 10 MG: 5 INJECTION, SOLUTION INTRAVENOUS at 09:55

## 2021-03-07 RX ADMIN — AMLODIPINE BESYLATE 10 MG: 10 TABLET ORAL at 05:46

## 2021-03-07 RX ADMIN — DOCUSATE SODIUM 50 MG AND SENNOSIDES 8.6 MG 2 TABLET: 8.6; 5 TABLET, FILM COATED ORAL at 05:46

## 2021-03-07 RX ADMIN — GABAPENTIN 600 MG: 300 CAPSULE ORAL at 19:59

## 2021-03-07 RX ADMIN — ASPIRIN 81 MG: 81 TABLET, COATED ORAL at 05:46

## 2021-03-07 RX ADMIN — METHYLPREDNISOLONE SODIUM SUCCINATE 40 MG: 40 INJECTION, POWDER, FOR SOLUTION INTRAMUSCULAR; INTRAVENOUS at 05:46

## 2021-03-07 RX ADMIN — DOXYCYCLINE 100 MG: 100 TABLET, FILM COATED ORAL at 19:59

## 2021-03-07 RX ADMIN — LABETALOL HYDROCHLORIDE 20 MG: 5 INJECTION, SOLUTION INTRAVENOUS at 16:12

## 2021-03-07 RX ADMIN — IPRATROPIUM BROMIDE AND ALBUTEROL SULFATE 3 ML: .5; 2.5 SOLUTION RESPIRATORY (INHALATION) at 06:32

## 2021-03-07 RX ADMIN — OXYCODONE 5 MG: 5 TABLET ORAL at 21:52

## 2021-03-07 RX ADMIN — CLOPIDOGREL BISULFATE 75 MG: 75 TABLET ORAL at 05:46

## 2021-03-07 RX ADMIN — INSULIN HUMAN 4 UNITS: 100 INJECTION, SOLUTION PARENTERAL at 16:32

## 2021-03-07 RX ADMIN — ENOXAPARIN SODIUM 40 MG: 40 INJECTION SUBCUTANEOUS at 05:47

## 2021-03-07 RX ADMIN — OXYCODONE 5 MG: 5 TABLET ORAL at 05:46

## 2021-03-07 RX ADMIN — OXYCODONE 5 MG: 5 TABLET ORAL at 16:12

## 2021-03-07 RX ADMIN — INSULIN HUMAN 3 UNITS: 100 INJECTION, SOLUTION PARENTERAL at 11:56

## 2021-03-07 RX ADMIN — LIDOCAINE 1 PATCH: 50 PATCH TOPICAL at 11:50

## 2021-03-07 RX ADMIN — HYDRALAZINE HYDROCHLORIDE 10 MG: 10 TABLET, FILM COATED ORAL at 21:52

## 2021-03-07 RX ADMIN — OMEPRAZOLE 20 MG: 20 CAPSULE, DELAYED RELEASE ORAL at 11:50

## 2021-03-07 RX ADMIN — SITAGLIPTIN 100 MG: 100 TABLET, FILM COATED ORAL at 06:05

## 2021-03-07 RX ADMIN — IPRATROPIUM BROMIDE AND ALBUTEROL SULFATE 3 ML: .5; 2.5 SOLUTION RESPIRATORY (INHALATION) at 19:47

## 2021-03-07 RX ADMIN — INSULIN HUMAN 2 UNITS: 100 INJECTION, SOLUTION PARENTERAL at 06:08

## 2021-03-07 RX ADMIN — IPRATROPIUM BROMIDE AND ALBUTEROL SULFATE 3 ML: .5; 2.5 SOLUTION RESPIRATORY (INHALATION) at 11:33

## 2021-03-07 RX ADMIN — ATORVASTATIN CALCIUM 40 MG: 40 TABLET, FILM COATED ORAL at 21:52

## 2021-03-07 RX ADMIN — VALSARTAN 320 MG: 80 TABLET, FILM COATED ORAL at 19:59

## 2021-03-07 RX ADMIN — OXYCODONE 5 MG: 5 TABLET ORAL at 09:56

## 2021-03-07 RX ADMIN — HYDRALAZINE HYDROCHLORIDE 10 MG: 10 TABLET, FILM COATED ORAL at 13:39

## 2021-03-07 RX ADMIN — METHYLPREDNISOLONE SODIUM SUCCINATE 40 MG: 40 INJECTION, POWDER, FOR SOLUTION INTRAMUSCULAR; INTRAVENOUS at 16:59

## 2021-03-07 ASSESSMENT — PAIN DESCRIPTION - PAIN TYPE
TYPE: ACUTE PAIN

## 2021-03-07 ASSESSMENT — ENCOUNTER SYMPTOMS
SPUTUM PRODUCTION: 1
VOMITING: 0
EYES NEGATIVE: 1
HEADACHES: 1
WEAKNESS: 0
NAUSEA: 0
SORE THROAT: 0
MEMORY LOSS: 1
BLURRED VISION: 0
DIZZINESS: 0
DEPRESSION: 0
COUGH: 1
CONSTITUTIONAL NEGATIVE: 1
SHORTNESS OF BREATH: 0
MYALGIAS: 0
PALPITATIONS: 0
GASTROINTESTINAL NEGATIVE: 1
MUSCULOSKELETAL NEGATIVE: 1
BRUISES/BLEEDS EASILY: 0
WEIGHT LOSS: 0
ABDOMINAL PAIN: 0
CHILLS: 0
FEVER: 0
DIAPHORESIS: 0
FOCAL WEAKNESS: 1

## 2021-03-07 ASSESSMENT — FIBROSIS 4 INDEX: FIB4 SCORE: 0.48

## 2021-03-07 ASSESSMENT — LIFESTYLE VARIABLES: SUBSTANCE_ABUSE: 0

## 2021-03-07 NOTE — PROGRESS NOTES
Hospital Medicine Daily Progress Note    Date of Service  3/7/2021    Chief Complaint  66 y.o. male admitted 3/5/2021 with past medical history of stroke with right-sided hemiplegia, diabetes, COPD who presents to the ER with a syncope.    Hospital Course  Patient was found to be hypoxic and revealing on 15 L nonrebreather.  He was given duo nebs, steroids and improved his oxygen to 4 L.  CT chest abdomen pelvis revealed no abnormalities.  CT scan T-spine found chronic compression fractures.  CT head found no abnormalities.  Chest x-ray found increased intravascular congestion.  Procalcitonin was 0.17.  Troponin were equivocal.  EKG found sinus tachycardia with poor progression.  Patient was started on steroids and inhalers for COPD exacerbation.    Interval Problem Update  3/6: Patient was found to be significantly wheezing on examination today.  We will continue with steroids and inhalers.  I have ordered a cardiac echo.  3/7: Patient respiratory status is improving.  I will decrease his steroids to every 12 hours.  Cardiac echo found EF of 55%.  Blood glucose is uncontrolled and I increase his sliding scale.  We will continue to wean him off oxygen.  Consultants/Specialty  None    Code Status  DNAR/DNI    Disposition  Transfer to medical  Review of Systems  Review of Systems   Constitutional: Negative.  Negative for chills, diaphoresis, fever, malaise/fatigue and weight loss.   HENT: Negative.  Negative for sore throat.    Eyes: Negative.  Negative for blurred vision.   Respiratory: Positive for cough and sputum production. Negative for shortness of breath.    Cardiovascular: Positive for chest pain (right sided chest wall). Negative for palpitations and leg swelling.   Gastrointestinal: Negative.  Negative for abdominal pain, nausea and vomiting.   Genitourinary: Negative.  Negative for dysuria.   Musculoskeletal: Negative.  Negative for myalgias.   Skin: Negative.  Negative for itching and rash.   Neurological:  Positive for focal weakness (r sided, per pt stable x 10 years) and headaches. Negative for dizziness and weakness.   Endo/Heme/Allergies: Negative.  Does not bruise/bleed easily.   Psychiatric/Behavioral: Positive for memory loss. Negative for depression, substance abuse and suicidal ideas.   All other systems reviewed and are negative.       Physical Exam  Temp:  [36.4 °C (97.5 °F)-37.1 °C (98.8 °F)] 36.4 °C (97.5 °F)  Pulse:  [] 82  Resp:  [16-20] 16  BP: (147-182)/(79-99) 164/90  SpO2:  [90 %-94 %] 91 %    Physical Exam  Vitals and nursing note reviewed. Exam conducted with a chaperone present.   Constitutional:       General: He is not in acute distress.     Appearance: Normal appearance. He is not diaphoretic.      Comments: Unkempt  Speaking in full sentences with out difficulty   HENT:      Head:      Comments: Bruise to left forehead     Nose: Nose normal.      Mouth/Throat:      Mouth: Mucous membranes are moist.   Eyes:      Pupils: Pupils are equal, round, and reactive to light.   Cardiovascular:      Rate and Rhythm: Normal rate and regular rhythm.      Pulses: Normal pulses.      Heart sounds: Normal heart sounds.   Pulmonary:      Effort: Pulmonary effort is normal.      Breath sounds: Wheezing and rhonchi present.      Comments: Diminished throughout with exp wheezing  Few upper rhonchi that clear with cough  Chest:      Chest wall: Tenderness (to palpation, right chest, no s/o trauma) present.   Abdominal:      General: Abdomen is flat. Bowel sounds are normal.      Palpations: Abdomen is soft.   Musculoskeletal:         General: No swelling or deformity. Normal range of motion.   Skin:     General: Skin is warm and dry.      Capillary Refill: Capillary refill takes less than 2 seconds.      Findings: Bruising present.   Neurological:      Mental Status: He is alert.      Cranial Nerves: No cranial nerve deficit.      Motor: Weakness (right sided hemiparesis, chronic per pt) present.       Comments: [facility]ed to person, [facility] and [facility]   Psychiatric:         Mood and Affect: Mood normal.         Behavior: Behavior normal.         Fluids    Intake/Output Summary (Last 24 hours) at 3/7/2021 1427  Last data filed at 3/7/2021 0742  Gross per 24 hour   Intake --   Output 800 ml   Net -800 ml       Laboratory  Recent Labs     03/05/21  1359 03/06/21  0112 03/07/21  0045   WBC 16.3* 10.5 15.4*   RBC 5.48 4.89 4.90   HEMOGLOBIN 16.4 14.6 14.4   HEMATOCRIT 51.6 45.0 44.5   MCV 94.2 92.0 90.8   MCH 29.9 29.9 29.4   MCHC 31.8* 32.4* 32.4*   RDW 48.9 47.4 46.9   PLATELETCT 247 236 274   MPV 9.6 9.5 9.2     Recent Labs     03/05/21  1600 03/06/21  0112 03/07/21  0045   SODIUM 139 130* 134*   POTASSIUM 4.1 3.8 4.1   CHLORIDE 99 94* 98   CO2 30 25 25   GLUCOSE 230* 336* 255*   BUN 10 16 21   CREATININE 1.02 1.17 0.92   CALCIUM 8.7 8.8 8.9     Recent Labs     03/05/21  1359   APTT 30.4   INR 0.84*         Recent Labs     03/06/21  0112   TRIGLYCERIDE 45   HDL 42   LDL 58       Imaging  EC-ECHOCARDIOGRAM COMPLETE W/ CONT   Final Result      CT-CHEST,ABDOMEN,PELVIS WITH   Final Result      1.  No acute abnormality within the chest, abdomen, or pelvis      2.  Hepatic steatosis      3.  Aortic and branch vessel atherosclerotic plaque      4.  Incidental bilateral renal cyst      No follow up imaging is recommended per consensus guidelines of the 2019 ACR Incidental Findings Committee for probably benign incidental simple appearing renal cystic lesion(s) based on imaging criteria.         CT-TSPINE W/O PLUS RECONS   Final Result      Mild wedge deformities of T12 and T2 have a chronic appearance.      Multilevel degenerative changes as above described.         CT-LSPINE W/O PLUS RECONS   Final Result      Mild wedge deformity of T12 has a chronic appearance.      Degenerative changes as above described, including facet arthropathy.      Dorsal displacement of the coccyx which is of indeterminate  age and may be chronic.      Atherosclerotic plaque.      CT-HEAD W/O   Final Result      No acute intracranial abnormality is identified.      There are periventricular and subcortical white matter changes present.  This finding is nonspecific and could be from previous small vessel ischemia, demyelination, or gliosis.      Hypodensity in the jaxon likely related to a prior lacunar infarct.      Encephalomalacia in the left occipital lobe is likely related to a prior infarct.      Left frontal scalp hematoma.      CT-CSPINE WITHOUT PLUS RECONS   Final Result      Multilevel degenerative changes as above described.      Minimal grade 1 anterolisthesis of C5 on C6.      Minimal loss of height of T2 has a chronic appearance.         DX-CHEST-LIMITED (1 VIEW)   Final Result      Mild pulmonary vascular congestion.      Atherosclerotic plaque.      Elevation of the right hemidiaphragm.              Assessment/Plan  * Acute exacerbation of chronic obstructive pulmonary disease (COPD) ([facility])  Assessment & Plan  Acute on chronic exacerbation  With suspected pneumonitis - doxy started  Not on o2 at baseline  Continue steroids and respiratory protocol    Essential hypertension  Assessment & Plan  Uncontrolled  Continue current home medications  IV as needed medications have been ordered  Added hydralazine 10 mg    Weakness  Assessment & Plan  Acute on chronic  Wheelchair bound at baseline  Chronic r hemiparesis    Encephalopathy  Assessment & Plan  Baseline unknown  Unclear if he had a concussion  CT negative  [facility]  Neuro checks  Seizure precautions  following    DM (diabetes mellitus) ([facility])  Assessment & Plan  With hyperglycemia  check a1c 7.8  Hold oral meds  SSI with hypoglycemic protocol  Diabetic diet    Closed head injury  Assessment & Plan  Evidence per [facility]  Found on ground with abrasion on head with stove in front of him dented  Suspect occurred during syncopal event  CT with no acute  abnormalities    Leukocytosis  Assessment & Plan  Etiology unclear  No obvious infection  Blood cultures urine cultures are negative  Stress rxn could be contributing    Syncope  Assessment & Plan  With LOC  Hypoxia likely contributed  Ordered a cardiac echo      Acute respiratory failure with hypoxia (HCC)  Assessment & Plan  On 4 L of O2 above his baseline       VTE prophylaxis: lovenox

## 2021-03-07 NOTE — THERAPY
Occupational Therapy   Initial Evaluation     Patient Name: Clint Duarte  Age:  66 y.o., Sex:  male  Medical Record #: [id number]  Today's Date: 3/6/2021     Precautions  Precautions: Fall Risk  Comments: encephalopathy and closed head injury (found on ground)    Assessment  Patient is 66 y.o. male with a diagnosis of encephalopathy and closed head injury. Pt is currently demonstrating decreased safety awareness, difficulty with word finding intermittently, unclear internalization of education within session, waxing and weaving mentation throughout the day. Would recommend formal cognition evaluation given acute head injury, h/o CVA unclear if pt had baseline cognitive deficits or not, and SW consult to address resources available to assist with IADL management. At this time, pt doesn't demonstrate ability to complete ADLs/IADLs safely at this time, not safe to d/c home. Will follow while in house.     Plan    Recommend Occupational Therapy 2 times per week until therapy goals are met for the following treatments:  Adaptive Equipment, Cognitive Skill Development, Self Care/Activities of Daily Living, Therapeutic Activities and Therapeutic Exercises.    DC Equipment Recommendations: Unable to determine at this time  Discharge Recommendations: Other -(Recommend cognition evaluation, likely needs 24/7)        Objective       03/06/21 1516   Prior Living Situation   Prior Services None   Housing / Facility 2 Story Apartment / Condo   Elevator Yes   Bathroom Set up Bathtub / Shower Combination;Grab Bars   Equipment Owned Power Wheel Chair;Grab Bar(s) In Tub / Shower   Lives with - Patient's Self Care Capacity Alone and Able to Care For Self   Comments Reports I with ADLs/IADLs baseline. Unclear how reliable pt's mentation. Per SW note, [facility], [facility].    Prior Level of ADL Function   Self Feeding Independent   Grooming / Hygiene Independent   Bathing Independent   Dressing  Independent  (using sock aid)   Toileting Independent   Prior Level of IADL Function   Medication Management Independent   Laundry Independent   Kitchen Mobility Independent   Finances Independent   Home Management Independent   Shopping Independent  (reports using electric w/c )   Prior Level Of Mobility Independent With Device in Home;Independent With Device in Community  (electric w/c )   Driving / Transportation Other (Comments)  (elctric w/c, or uses access and bus)   Comments unclear how efficiently pt is able to perform IADLs   Cognition    Cognition / Consciousness X   Level of Consciousness Alert   Safety Awareness Impaired   New Learning Impaired   Comments noted to have waxing/weaning cognition throughout the day. Unclear if pt has baseline cognitive deficts given h/o CVA   Active ROM Upper Body   Active ROM Upper Body  X   Comments LUE functional, RUE flaccid, non-functional   Strength Upper Body   Upper Body Strength  X   Comments LUE functional, RUE non-functional   Sensation Upper Body   Upper Extremity Sensation  X   Comments baseline RUE deficits   Upper Body Muscle Tone   Upper Body Muscle Tone  X   Comments RUE flaccid   Coordination Upper Body   Coordination X   Comments RUE non-functional   Balance Assessment   Sitting Balance (Static) Fair   Sitting Balance (Dynamic) Fair   Standing Balance (Static) Fair -   Weight Shift Sitting Fair   Weight Shift Standing Absent   Comments squat pivot t/f to chair   Bed Mobility    Supine to Sit Supervised  (cga)   Sit to Supine   (up in chair post session)   Scooting Supervised  (seated)   Comments slightly raised hob   ADL Assessment   Eating Modified Independent  (after s/u)   Grooming Supervision;Seated   Bathing   (NT)   Upper Body Dressing Supervision   Lower Body Dressing Minimal Assist   Toileting   (refused)   Functional Mobility   Sit to Stand Minimal Assist   Bed, Chair, Wheelchair Transfer Supervised   Toilet Transfers Unable to Participate    Transfer Method Squat Pivot   Mobility bed mobility, eob->chair    Short Term Goals   Short Term Goal # 1 Pt will perform toileting task with supervision   Short Term Goal # 2 Pt will perform functional t/f's with supervision   Anticipated Discharge Equipment and Recommendations   DC Equipment Recommendations Unable to determine at this time

## 2021-03-07 NOTE — PROGRESS NOTES
Assumed care this pm from day shift RN. Patient sitting up in bed with no signs of distress. Patient AxO 2, O2 @ 2 L thru nasal cannula, VSS. Call bell and personal items within reach, bed locked in low position. Bed exit alarm on. Hourly rounding in place. Tele box in place, monitor room notified.

## 2021-03-07 NOTE — PROGRESS NOTES
Hospital Medicine Daily Progress Note    Date of Service  3/6/2021    Chief Complaint  66 y.o. male admitted 3/5/2021 with past medical history of stroke with right-sided hemiplegia, diabetes, COPD who presents to the ER with a syncope.    Hospital Course  Patient was found to be hypoxic and revealing on 15 L nonrebreather.  He was given duo nebs, steroids and improved his oxygen to 4 L.  CT chest abdomen pelvis revealed no abnormalities.  CT scan T-spine found chronic compression fractures.  CT head found no abnormalities.  Chest x-ray found increased intravascular congestion.  Procalcitonin was 0.17.  Troponin were equivocal.  EKG found sinus tachycardia with poor progression.  Patient was started on steroids and inhalers for COPD exacerbation.    Interval Problem Update  3/6: Patient was found to be significantly wheezing on examination today.  We will continue with steroids and inhalers.  I have ordered a cardiac echo.    Consultants/Specialty  None    Code Status  DNAR/DNI    Disposition  PT OT eval    Review of Systems  Review of Systems   Constitutional: Negative.  Negative for chills, diaphoresis, fever, malaise/fatigue and weight loss.   HENT: Negative.  Negative for sore throat.    Eyes: Negative.  Negative for blurred vision.   Respiratory: Positive for cough and sputum production. Negative for shortness of breath.    Cardiovascular: Positive for chest pain (right sided chest wall). Negative for palpitations and leg swelling.   Gastrointestinal: Negative.  Negative for abdominal pain, nausea and vomiting.   Genitourinary: Negative.  Negative for dysuria.   Musculoskeletal: Negative.  Negative for myalgias.   Skin: Negative.  Negative for itching and rash.   Neurological: Positive for focal weakness (r sided, per pt stable x 10 years) and headaches. Negative for dizziness and weakness.   Endo/Heme/Allergies: Negative.  Does not bruise/bleed easily.   Psychiatric/Behavioral: Positive for memory loss.  Negative for depression, substance abuse and suicidal ideas.   All other systems reviewed and are negative.       Physical Exam  Temp:  [36.4 °C (97.5 °F)-37.3 °C (99.2 °F)] 37.1 °C (98.8 °F)  Pulse:  [] 100  Resp:  [12-26] 18  BP: (142-195)/(74-99) 147/79  SpO2:  [91 %-98 %] 91 %    Physical Exam  Vitals and nursing note reviewed. Exam conducted with a chaperone present.   Constitutional:       General: He is not in acute distress.     Appearance: Normal appearance. He is not diaphoretic.      Comments: Unkempt  Speaking in full sentences with out difficulty   HENT:      Head:      Comments: Bruise to left forehead     Nose: Nose normal.      Mouth/Throat:      Mouth: Mucous membranes are moist.   Eyes:      Pupils: Pupils are equal, round, and reactive to light.   Cardiovascular:      Rate and Rhythm: Normal rate and regular rhythm.      Pulses: Normal pulses.      Heart sounds: Normal heart sounds.   Pulmonary:      Effort: Pulmonary effort is normal.      Breath sounds: Wheezing and rhonchi present.      Comments: Diminished throughout with exp wheezing  Few upper rhonchi that clear with cough  Chest:      Chest wall: Tenderness (to palpation, right chest, no s/o trauma) present.   Abdominal:      General: Abdomen is flat. Bowel sounds are normal.      Palpations: Abdomen is soft.   Musculoskeletal:         General: No swelling or deformity. Normal range of motion.   Skin:     General: Skin is warm and dry.      Capillary Refill: Capillary refill takes less than 2 seconds.      Findings: Bruising present.   Neurological:      Mental Status: He is alert.      Cranial Nerves: No cranial nerve deficit.      Motor: Weakness (right sided hemiparesis, chronic per pt) present.      Comments: Oriented to person, [facility], and to [facility]   Psychiatric:         Mood and Affect: Mood normal.         Behavior: Behavior normal.         Fluids    Intake/Output Summary (Last 24 hours) at 3/6/2021  1719  Last data filed at 3/6/2021 1024  Gross per 24 hour   Intake 120 ml   Output 1150 ml   Net -1030 ml       Laboratory  Recent Labs     03/05/21  1359 03/06/21  0112   WBC 16.3* 10.5   RBC 5.48 4.89   HEMOGLOBIN 16.4 14.6   HEMATOCRIT 51.6 45.0   MCV 94.2 92.0   MCH 29.9 29.9   MCHC 31.8* 32.4*   RDW 48.9 47.4   PLATELETCT 247 236   MPV 9.6 9.5     Recent Labs     03/05/21  1600 03/06/21  0112   SODIUM 139 130*   POTASSIUM 4.1 3.8   CHLORIDE 99 94*   CO2 30 25   GLUCOSE 230* 336*   BUN 10 16   CREATININE 1.02 1.17   CALCIUM 8.7 8.8     Recent Labs     03/05/21  1359   APTT 30.4   INR 0.84*         Recent Labs     03/06/21  0112   TRIGLYCERIDE 45   HDL 42   LDL 58       Imaging  CT-CHEST,ABDOMEN,PELVIS WITH   Final Result      1.  No acute abnormality within the chest, abdomen, or pelvis      2.  Hepatic steatosis      3.  Aortic and branch vessel atherosclerotic plaque      4.  Incidental bilateral renal cyst      No follow up imaging is recommended per consensus guidelines of the 2019 ACR Incidental Findings Committee for probably benign incidental simple appearing renal cystic lesion(s) based on imaging criteria.         CT-TSPINE W/O PLUS RECONS   Final Result      Mild wedge deformities of T12 and T2 have a chronic appearance.      Multilevel degenerative changes as above described.         CT-LSPINE W/O PLUS RECONS   Final Result      Mild wedge deformity of T12 has a chronic appearance.      Degenerative changes as above described, including facet arthropathy.      Dorsal displacement of the coccyx which is of indeterminate age and may be chronic.      Atherosclerotic plaque.      CT-HEAD W/O   Final Result      No acute intracranial abnormality is identified.      There are periventricular and subcortical white matter changes present.  This finding is nonspecific and could be from previous small vessel ischemia, demyelination, or gliosis.      Hypodensity in the jaxon likely related to a prior lacunar  infarct.      Encephalomalacia in the left occipital lobe is likely related to a prior infarct.      Left frontal scalp hematoma.      CT-CSPINE WITHOUT PLUS RECONS   Final Result      Multilevel degenerative changes as above described.      Minimal grade 1 anterolisthesis of C5 on C6.      Minimal loss of height of T2 has a chronic appearance.         DX-CHEST-LIMITED (1 VIEW)   Final Result      Mild pulmonary vascular congestion.      Atherosclerotic plaque.      Elevation of the right hemidiaphragm.         EC-ECHOCARDIOGRAM COMPLETE W/ CONT    (Results Pending)        Assessment/Plan  * Acute exacerbation of chronic obstructive pulmonary disease (COPD) ([facility])  Assessment & Plan  Acute on chronic exacerbation  With suspected pneumonitis - doxy started  Not on o2 at baseline  Continue steroids and respiratory protocol    Essential hypertension  Assessment & Plan  Uncontrolled  Continue current home medications  IV as needed medications have been ordered  Added hydralazine 10 mg    Weakness  Assessment & Plan  Acute on chronic  Wheelchair bound at baseline  Chronic r hemiparesis    Encephalopathy  Assessment & Plan  Baseline unknown  Unclear if he had a concussion  CT negative  [facility]  Neuro checks  Seizure precautions  following    DM (diabetes mellitus) ([facility])  Assessment & Plan  With hyperglycemia  check a1c 7.8  Hold oral meds  SSI with hypoglycemic protocol  Diabetic diet    Closed head injury  Assessment & Plan  Evidence per [facility]  Found on ground with abrasion on head with stove in front of him dented  Suspect occurred during syncopal event  CT with no acute abnormalities    Leukocytosis  Assessment & Plan  Etiology unclear  No obvious infection  Blood cultures urine cultures are negative  Stress rxn could be contributing    Syncope  Assessment & Plan  With LOC  Hypoxia likely contributed  Ordered a cardiac echo      Acute respiratory failure with hypoxia ([facility])  Assessment & Plan  On 4 L of O2  above his baseline       VTE prophylaxis: lovenox

## 2021-03-07 NOTE — ASSESSMENT & PLAN NOTE
Controlled  Continue current home medications losartan, metoprolol, amlodipine  IV as needed medications have been ordered

## 2021-03-08 PROBLEM — Z02.9 DISCHARGE PLANNING ISSUES: Status: ACTIVE | Noted: 2021-03-08

## 2021-03-08 LAB
ALBUMIN SERPL BCP-MCNC: 3.6 G/DL (ref 3.2–4.9)
ALBUMIN/GLOB SERPL: 1.1 G/DL
ALP SERPL-CCNC: 82 U/L (ref 30–99)
ALT SERPL-CCNC: 15 U/L (ref 2–50)
ANION GAP SERPL CALC-SCNC: 13 MMOL/L (ref 7–16)
AST SERPL-CCNC: 8 U/L (ref 12–45)
BASOPHILS # BLD AUTO: 0.1 % (ref 0–1.8)
BASOPHILS # BLD: 0.01 K/UL (ref 0–0.12)
BILIRUB SERPL-MCNC: 0.3 MG/DL (ref 0.1–1.5)
BUN SERPL-MCNC: 22 MG/DL (ref 8–22)
CALCIUM SERPL-MCNC: 9.1 MG/DL (ref 8.5–10.5)
CHLORIDE SERPL-SCNC: 97 MMOL/L (ref 96–112)
CO2 SERPL-SCNC: 25 MMOL/L (ref 20–33)
CREAT SERPL-MCNC: 0.89 MG/DL (ref 0.5–1.4)
EOSINOPHIL # BLD AUTO: 0 K/UL (ref 0–0.51)
EOSINOPHIL NFR BLD: 0 % (ref 0–6.9)
ERYTHROCYTE [DISTWIDTH] IN BLOOD BY AUTOMATED COUNT: 46 FL (ref 35.9–50)
GLOBULIN SER CALC-MCNC: 3.2 G/DL (ref 1.9–3.5)
GLUCOSE BLD-MCNC: 169 MG/DL (ref 65–99)
GLUCOSE BLD-MCNC: 252 MG/DL (ref 65–99)
GLUCOSE BLD-MCNC: 291 MG/DL (ref 65–99)
GLUCOSE BLD-MCNC: 87 MG/DL (ref 65–99)
GLUCOSE SERPL-MCNC: 227 MG/DL (ref 65–99)
HCT VFR BLD AUTO: 43.7 % (ref 42–52)
HGB BLD-MCNC: 14.5 G/DL (ref 14–18)
IMM GRANULOCYTES # BLD AUTO: 0.07 K/UL (ref 0–0.11)
IMM GRANULOCYTES NFR BLD AUTO: 0.4 % (ref 0–0.9)
LYMPHOCYTES # BLD AUTO: 0.97 K/UL (ref 1–4.8)
LYMPHOCYTES NFR BLD: 6.1 % (ref 22–41)
MCH RBC QN AUTO: 30.2 PG (ref 27–33)
MCHC RBC AUTO-ENTMCNC: 33.2 G/DL (ref 33.7–35.3)
MCV RBC AUTO: 91 FL (ref 81.4–97.8)
MONOCYTES # BLD AUTO: 0.72 K/UL (ref 0–0.85)
MONOCYTES NFR BLD AUTO: 4.5 % (ref 0–13.4)
NEUTROPHILS # BLD AUTO: 14.17 K/UL (ref 1.82–7.42)
NEUTROPHILS NFR BLD: 88.9 % (ref 44–72)
NRBC # BLD AUTO: 0 K/UL
NRBC BLD-RTO: 0 /100 WBC
PLATELET # BLD AUTO: 292 K/UL (ref 164–446)
PMV BLD AUTO: 9.1 FL (ref 9–12.9)
POTASSIUM SERPL-SCNC: 3.9 MMOL/L (ref 3.6–5.5)
PROT SERPL-MCNC: 6.8 G/DL (ref 6–8.2)
RBC # BLD AUTO: 4.8 M/UL (ref 4.7–6.1)
SODIUM SERPL-SCNC: 135 MMOL/L (ref 135–145)
TROPONIN T SERPL-MCNC: 13 NG/L (ref 6–19)
WBC # BLD AUTO: 15.9 K/UL (ref 4.8–10.8)

## 2021-03-08 PROCEDURE — A9270 NON-COVERED ITEM OR SERVICE: HCPCS | Performed by: HOSPITALIST

## 2021-03-08 PROCEDURE — 94640 AIRWAY INHALATION TREATMENT: CPT

## 2021-03-08 PROCEDURE — 99406 BEHAV CHNG SMOKING 3-10 MIN: CPT

## 2021-03-08 PROCEDURE — 700111 HCHG RX REV CODE 636 W/ 250 OVERRIDE (IP): Performed by: HOSPITALIST

## 2021-03-08 PROCEDURE — 92523 SPEECH SOUND LANG COMPREHEN: CPT

## 2021-03-08 PROCEDURE — 700101 HCHG RX REV CODE 250: Performed by: HOSPITALIST

## 2021-03-08 PROCEDURE — 36415 COLL VENOUS BLD VENIPUNCTURE: CPT

## 2021-03-08 PROCEDURE — A9270 NON-COVERED ITEM OR SERVICE: HCPCS | Performed by: INTERNAL MEDICINE

## 2021-03-08 PROCEDURE — 94760 N-INVAS EAR/PLS OXIMETRY 1: CPT

## 2021-03-08 PROCEDURE — 94669 MECHANICAL CHEST WALL OSCILL: CPT

## 2021-03-08 PROCEDURE — 80053 COMPREHEN METABOLIC PANEL: CPT

## 2021-03-08 PROCEDURE — 82962 GLUCOSE BLOOD TEST: CPT | Mod: 91

## 2021-03-08 PROCEDURE — 99232 SBSQ HOSP IP/OBS MODERATE 35: CPT | Performed by: INTERNAL MEDICINE

## 2021-03-08 PROCEDURE — 84484 ASSAY OF TROPONIN QUANT: CPT

## 2021-03-08 PROCEDURE — 85025 COMPLETE CBC W/AUTO DIFF WBC: CPT

## 2021-03-08 PROCEDURE — 700102 HCHG RX REV CODE 250 W/ 637 OVERRIDE(OP): Performed by: INTERNAL MEDICINE

## 2021-03-08 PROCEDURE — 700102 HCHG RX REV CODE 250 W/ 637 OVERRIDE(OP): Performed by: HOSPITALIST

## 2021-03-08 PROCEDURE — 770006 HCHG ROOM/CARE - MED/SURG/GYN SEMI*

## 2021-03-08 RX ORDER — HYDRALAZINE HYDROCHLORIDE 25 MG/1
25 TABLET, FILM COATED ORAL EVERY 8 HOURS
Status: DISCONTINUED | OUTPATIENT
Start: 2021-03-08 | End: 2021-03-16

## 2021-03-08 RX ORDER — METHYLPREDNISOLONE SODIUM SUCCINATE 40 MG/ML
40 INJECTION, POWDER, LYOPHILIZED, FOR SOLUTION INTRAMUSCULAR; INTRAVENOUS DAILY
Status: COMPLETED | OUTPATIENT
Start: 2021-03-09 | End: 2021-03-10

## 2021-03-08 RX ADMIN — AMLODIPINE BESYLATE 10 MG: 10 TABLET ORAL at 05:41

## 2021-03-08 RX ADMIN — INSULIN HUMAN 3 UNITS: 100 INJECTION, SOLUTION PARENTERAL at 05:46

## 2021-03-08 RX ADMIN — LABETALOL HYDROCHLORIDE 10 MG: 5 INJECTION, SOLUTION INTRAVENOUS at 15:16

## 2021-03-08 RX ADMIN — HYDRALAZINE HYDROCHLORIDE 25 MG: 25 TABLET, FILM COATED ORAL at 22:22

## 2021-03-08 RX ADMIN — HYDRALAZINE HYDROCHLORIDE 10 MG: 10 TABLET, FILM COATED ORAL at 05:41

## 2021-03-08 RX ADMIN — VALSARTAN 320 MG: 80 TABLET, FILM COATED ORAL at 22:31

## 2021-03-08 RX ADMIN — SITAGLIPTIN 100 MG: 100 TABLET, FILM COATED ORAL at 05:47

## 2021-03-08 RX ADMIN — IPRATROPIUM BROMIDE AND ALBUTEROL SULFATE 3 ML: .5; 2.5 SOLUTION RESPIRATORY (INHALATION) at 16:15

## 2021-03-08 RX ADMIN — DOXYCYCLINE 100 MG: 100 TABLET, FILM COATED ORAL at 08:18

## 2021-03-08 RX ADMIN — ATORVASTATIN CALCIUM 40 MG: 40 TABLET, FILM COATED ORAL at 22:22

## 2021-03-08 RX ADMIN — HYDRALAZINE HYDROCHLORIDE 25 MG: 25 TABLET, FILM COATED ORAL at 14:30

## 2021-03-08 RX ADMIN — LABETALOL HYDROCHLORIDE 10 MG: 5 INJECTION, SOLUTION INTRAVENOUS at 07:23

## 2021-03-08 RX ADMIN — IPRATROPIUM BROMIDE AND ALBUTEROL SULFATE 3 ML: .5; 2.5 SOLUTION RESPIRATORY (INHALATION) at 20:21

## 2021-03-08 RX ADMIN — DOXYCYCLINE 100 MG: 100 TABLET, FILM COATED ORAL at 22:22

## 2021-03-08 RX ADMIN — ASPIRIN 81 MG: 81 TABLET, COATED ORAL at 05:41

## 2021-03-08 RX ADMIN — INSULIN HUMAN 7 UNITS: 100 INJECTION, SOLUTION PARENTERAL at 11:41

## 2021-03-08 RX ADMIN — OXYCODONE 5 MG: 5 TABLET ORAL at 22:23

## 2021-03-08 RX ADMIN — CLOPIDOGREL BISULFATE 75 MG: 75 TABLET ORAL at 05:41

## 2021-03-08 RX ADMIN — ENOXAPARIN SODIUM 40 MG: 40 INJECTION SUBCUTANEOUS at 05:41

## 2021-03-08 RX ADMIN — OXYCODONE 5 MG: 5 TABLET ORAL at 11:48

## 2021-03-08 RX ADMIN — IPRATROPIUM BROMIDE AND ALBUTEROL SULFATE 3 ML: .5; 2.5 SOLUTION RESPIRATORY (INHALATION) at 23:06

## 2021-03-08 RX ADMIN — OXYCODONE 5 MG: 5 TABLET ORAL at 05:41

## 2021-03-08 RX ADMIN — IPRATROPIUM BROMIDE AND ALBUTEROL SULFATE 3 ML: .5; 2.5 SOLUTION RESPIRATORY (INHALATION) at 10:47

## 2021-03-08 RX ADMIN — INSULIN HUMAN 7 UNITS: 100 INJECTION, SOLUTION PARENTERAL at 17:18

## 2021-03-08 RX ADMIN — GABAPENTIN 600 MG: 300 CAPSULE ORAL at 22:22

## 2021-03-08 RX ADMIN — DOCUSATE SODIUM 50 MG AND SENNOSIDES 8.6 MG 2 TABLET: 8.6; 5 TABLET, FILM COATED ORAL at 05:47

## 2021-03-08 RX ADMIN — METHYLPREDNISOLONE SODIUM SUCCINATE 40 MG: 40 INJECTION, POWDER, FOR SOLUTION INTRAMUSCULAR; INTRAVENOUS at 05:41

## 2021-03-08 RX ADMIN — TIOTROPIUM BROMIDE INHALATION SPRAY 5 MCG: 3.12 SPRAY, METERED RESPIRATORY (INHALATION) at 09:33

## 2021-03-08 RX ADMIN — OMEPRAZOLE 40 MG: 20 CAPSULE, DELAYED RELEASE ORAL at 05:41

## 2021-03-08 ASSESSMENT — ENCOUNTER SYMPTOMS
MYALGIAS: 0
FOCAL WEAKNESS: 1
BLURRED VISION: 0
BRUISES/BLEEDS EASILY: 0
DIAPHORESIS: 0
WEIGHT LOSS: 0
FEVER: 0
PALPITATIONS: 0
WEAKNESS: 0
GASTROINTESTINAL NEGATIVE: 1
CHILLS: 0
ABDOMINAL PAIN: 0
NAUSEA: 0
SHORTNESS OF BREATH: 0
VOMITING: 0
DIZZINESS: 0
CONSTITUTIONAL NEGATIVE: 1
MUSCULOSKELETAL NEGATIVE: 1
SORE THROAT: 0
MEMORY LOSS: 1
EYES NEGATIVE: 1
DEPRESSION: 0
COUGH: 1
SPUTUM PRODUCTION: 0
HEADACHES: 1

## 2021-03-08 ASSESSMENT — PAIN DESCRIPTION - PAIN TYPE
TYPE: ACUTE PAIN

## 2021-03-08 ASSESSMENT — LIFESTYLE VARIABLES: SUBSTANCE_ABUSE: 0

## 2021-03-08 ASSESSMENT — PAIN SCALES - WONG BAKER: WONGBAKER_NUMERICALRESPONSE: HURTS EVEN MORE

## 2021-03-08 NOTE — RESPIRATORY CARE
REMOTE MONITORING PROGRAM by COPD NAVIGATOR  3/8/2021 at 8:38 AM by Luisa Bolivar, RRT     Patient interviewed by COPD navigator. Patient unable to participate in remote monitoring due to possible SNF placement. Can revisit if deposition changes.

## 2021-03-08 NOTE — CARE PLAN
Problem: Safety  Goal: Will remain free from falls  Outcome: PROGRESSING AS EXPECTED  Note: Pt educated on fall precaution. Pt has nonskid socks on, bed alarm is on. Pt bed is locked and in lowest position.      Problem: Knowledge Deficit  Goal: Knowledge of disease process/condition, treatment plan, diagnostic tests, and medications will improve  Outcome: PROGRESSING AS EXPECTED  Note: Pt is alert and oriented x2. Pt continues to be confused however this seems to be pts baseline. Pt awaiting cog eval.

## 2021-03-08 NOTE — DISCHARGE PLANNING
Anticipated Discharge Disposition: TBD    Action: LSW requested NOK search for Pt. He reprorts he has a sister in [place], [place] named Dolly Duarte.     Barriers to Discharge: Pt not safe to return to living alone, Pending Cog Eval.     Plan: LSW to f/u on NOK search

## 2021-03-08 NOTE — ASSESSMENT & PLAN NOTE
Patient oriented x2   OT- Not safe to DC home as patient is unable to complete ADLs  SLP cognitive eval notes patient needs 24/7 supervision.  Now recommends home health.  CM to help find NOK

## 2021-03-08 NOTE — THERAPY
"Speech Language Pathology   Initial Assessment     Patient Name: Clint Duarte  AGE:  66 y.o., SEX:  male  Medical Record #: [id number]  Today's Date: 3/8/2021     Precautions  Precautions: Fall Risk  Comments: encephalopathy and closed head injury (found on ground)    Assessment  Patient is 66 y.o. male admitted 3/5 as a trauma green after being found unconscious in front of his stove, w/ a dent in the stove, and evidence of head trauma. Was also hypoxic on RA at 70%. +LOC. Head CT negative for acute intracranial abnormality. PMHx of CVA, DM, R-sided weakness, COPD. No previous SLP notes in EMR.     Patient seen this date for cognitive-linguistic evaluation. Patient awake, alert, oriented in all spheres with the exception of day of the month, day of the week, and reason for admit. Patient was noted to have minimal dysarthria with slow verbal output, but reported several times that this was baseline from previous CVA ~9 years ago. Patient also reported R-sided weakness, R visual neglect, and difficulty with reading and writing is all baseline d/t previous CVA as well,  stating \"I use my power wheelchair and I have trouble sometimes. I run into things and am a fall risk because I can't see well.\" When patient was asked about report of falling and hitting head w/ dent in stove, patient reported \"well I had shingles recently, so that's what my scalp wound is from\" and \"that dent has been in my stove for awhile because I ran my wheelchair into it.\" When asked if patient feels he needs more help at home, he stated \"I definitely do. Like therapy and someone to help me do stuff like shower. I don't have a handicap-accessible apartment and I need one.\"     Patient was given the Cognistat with a combination of other non-standard assessments. Patient presented with minimal word-finding deficits in conversation, but does report this is worse from baseline. Patient also presented with moderate deficits in auditory comprehension " "of paragraph, short-term memory, simple problem-solving, and medication management. Patient was unable to complete tasks for reading and writing d/t reported right visual neglect from previous CVA and baseline R-sided weakness and significant difficulty w/ writing d/t same. Patient reports he feels at baseline w/ deficits w/ the exception of difficulty w/ short-term memory and word-finding, which he reports are \"worse now.\"     At this time, patient appears to be close to baseline level of cognitive functioning per his report, however, does feel that he needs more assistance at home and would benefit from same. SLP will follow for cognitive therapy. Recommend patient be provided initially with 24/7 supervision upon discharge to ensure safety and sound decision-making. Consider home safety eval as well to further assess patient's functioning in home environment.       Plan  Recommend Speech Therapy 2 times per week until therapy goals are met for the following treatments:  Reading Training, Writing Training, Cognitive-Linguistic Training and Patient / Family / Caregiver Education.    Discharge Recommendations: Recommend home health for continued speech therapy services     Objective     03/08/21 1510   Vitals   O2 Delivery Device None - Room Air  (Simultaneous filing. User may not have seen previous data.)   Verbal Expression   Vocal Quality Clear   Verbal Output Automatic Within Functional Limits (6-7)   Verbal Output: Phrases Within Functional Limits (6-7)   Verbal Output Conversation Minimal (4)   Verbal Output Functional Minimal (4)   Repetition: Single Words Within Functional Limits (6-7)   Repetition: Phrases Within Functional Limits (6-7)   Repetition: Sentences Within Functional Limits (6-7)   Naming Within Functional Limits (6-7)  (But pt reports recent difficulty )   Dysarthria Minimal (4)  (Reports baseline from previous CVA)   Word Finding Deficits Minimal (4)  (Minimal, but reports worse recently ) "   Auditory Comprehension   Yes / No Questions: Personal Information Within Functional Limits (6-7)   Yes / No Questions: General Information Within Functional Limits (6-7)   Yes / No Questions: Abstract Within Functional Limits (6-7)   Identifies Objects Within Functional Limits (6-7)   Identifies Pictures Within Functional Limits (6-7)   Follows One Unit Commands Within Functional Limits (6-7)   Follows Two Unit Commands Within Functional Limits (6-7)   Follows Three Unit Commands Within Functional Limits (6-7)   Understands Paragraph Moderate (3)  (Moderate deficits in auditory comprehension at paragraph lvl)   Understands Simple, Structured Conversation  Within Functional Limits (6-7)   Understands Complex Conversation Within Functional Limits (6-7)   Reading Comprehension   Reading Comprehension (WDL)   (Pt declined d/t visual impairments )   Written Expression   Written Expression (WDL)   (Pt declined d/t RUE weakness from CVA )   Cognitive-Linguistic   Level of Consciousness Alert   Orientation Level Not Oriented to Day;Not Oriented to Reason   Sustained Attention Within Functional Limits (6-7)   Short Term Memory Moderate (3)   Simple Reasoning / Problem Solving Moderate (3)   Wann Reasoning Within Functional Limits (6-7)   Safety Awareness Within Functional Limits (6-7)   Insight into Deficits Within Functional Limits (6-7)   Auditory Math Within Functional Limits (6-7)   Medication Management  Moderate (3)   Social / Pragmatic Communication   Social / Pragmatic Communication WDL   Short Term Goals   Short Term Goal # 1 Patient will be oriented in all spheres with minimal verbal cues from SLP.    Short Term Goal # 2 Patient will complete generative naming tasks in 2/3 attempts with minimal verbal cues.    Short Term Goal # 3 Patient will recall 3/4 items from memory after 10 minutes with moderate verbal cues from SLP.    Anticipated Discharge Needs   Discharge Recommendations Recommend home health for  continued speech therapy services

## 2021-03-08 NOTE — RESPIRATORY CARE
COPD EDUCATION by COPD CLINICAL EDUCATOR  3/8/2021  at  8:27 AM by Elizabeth Bazzi, RRT     Patient interviewed by COPD education team.  Patient unable to participate in full program. Provided our comprehensive education packet including information about COPD, treatments and Oxygen use with discussion at bedside. Pt is somewhat clearer today but is a difficult historian. He does not use Oxygen at home. [facility] He states he has a difficult time seeing clearly-this was discussed with his Day RN. We reviewed an action Plan as well.    COPD Screen  Do you have a history of COPD?: Yes  Do you have a Pulmonologist?: [facility]  COPD Population Screener  During the past 4 weeks, how much did you feel short of breath?: None/Little of the time  Do you ever cough up any mucus or phlegm?: Yes, a few days a week or month  In the past 12 months, you do less than you used to because of your breathing problems: Disagree/unsure  Have you smoked at least 100 cigarettes in your entire life?: Yes  How old are you?: 60+  COPD Screening Score: 5  COPD Coordinator Not Recommended: Yes    COPD Assessment  COPD Clinical Specialists ONLY  COPD Education Initiated: Yes--Short Intervention  DIfficult to engage patient -baseline with CVA deficits.   D/W RN discharge concerns   Reviewed medications noted below. He refused a spacer    Requested Meds To Beds review   [facility] connected  Physician [facility]: [facility] Name: [facility]  Referrals Initiated: Initiated  Smoking Cessation: Yes  $ Smoking Cessation 3-10 Minutes: Symptomatic  Palliative Care: Yes(Palliative has been consulted; SW for discharge needs)  Home Health Care: (D/W RN concerns for assistance poss SNF DC)  Is this a COPD exacerbation patient?: Yes       MY COPD ACTION PLAN     It is recommended that patients and physicians /healthcare providers complete this action plan together. This plan should be  "discussed at each physician visit and updated as needed.    The green, yellow and red zones show groups of symptoms of COPD. This list of symptoms is not comprehensive, and you may experience other symptoms. In the \"Actions\" column, your healthcare provider has recommended actions for you to take based on your symptoms.    Patient Name: Clint Duarte   YOB: 1955   Last Updated on:     Green Zone:  I am doing well today Actions   •  Usual activitiy and exercise level •  Take daily medications   •  Usual amounts of cough and phlegm/mucus •  Use oxygen as prescribed   •  Sleep well at night •  Continue regular exercise/diet plan   •  Appetite is good •  At all times avoid cigarette smoke, inhaled irritants     Daily Medications (these medications are taken every day):   Tiotropium Bromide Monohydrate (Spiriva) 2 Puffs Once daily        Yellow Zone:  I am having a bad day or a COPD flare Actions   •  More breathless than usual •  Continue daily medications   •  I have less energy for my daily activities •  Use quick relief inhaler as ordered   •  Increased or thicker phlegm/mucus •  Use oxygen as prescribed   •  Using quick relief inhaler/nebulizer more often •  Get plenty of rest   •  Swelling of ankles more than usual •  Use pursed lip breathing   •  More coughing than usual •  At all times avoid cigarette smoke, inhaled irritants   •  I feel like I have a \"chest cold\"   •  Poor sleep and my symptoms woke me up   •  My appetite is not good   •  My medicine is not helping    •  Call provider immediately if symptoms don’t improve     Continue daily medications, add rescue medications:   Albuterol 2 Puffs         Medications to be used during a flare up, (as Discussed with Provider):           Additional Information:  Use your rescue inhaler with your spacer as directed by your Doctor - every 6 hours as needed    Red Zone:  I need urgent medical care Actions   •  Severe shortness of breath even at rest •  " Call 911 or seek medical care immediately   •  Not able to do any activity because of breathing    •  Fever or shaking chills    •  Feeling confused or very drowsy     •  Chest pains    •  Coughing up blood

## 2021-03-08 NOTE — DISCHARGE PLANNING
Care Transition Team Assessment    LSW met with Pt at bedside to verify facesheet information and assessment. PT lives alone and has part time caregivers who come in a few times a week to do housekeeping tasks. Pt also had a nurse and PT coming to see him through the [facility] but they stopped. Pt reports that  Paola with the [facility] helped him set up these services. Pt gave LSW permission to contact Paola to assist with d/c planning. Pt reports that he has no friends or family as a support system. He has a sister named Dolly Duarte who lives in [place], [place] but he doesn't know her phone number and isn't in contact with her.     Information Source  Orientation : Disoriented to Event, Disoriented to Time    Readmission Evaluation  Is this a readmission?: No    Elopement Risk  Legal Hold: No  Ambulatory or Self Mobile in Wheelchair: No-Not an Elopement Risk  Elopement Risk: Not at Risk for Elopement    Interdisciplinary Discharge Planning  Primary Care Physician: [facility].  Lives with - Patient's Self Care Capacity: Alone and Unable to Care For Self  Patient or legal guardian wants to designate a caregiver: No  Support Systems: None  Housing / Facility: 2 Story Apartment / Condo  Do You Take your Prescribed Medications Regularly: Yes  Able to Return to Previous ADL's: Future Time w/Therapy  Mobility Issues: Yes  Prior Services: Skilled Home Health Services, Housekeeping / Homemaker Services(Per PT these [facility] the [facility])  Patient Prefers to be Discharged to:: Home  Assistance Needed: Yes  Durable Medical Equipment: (Electric wheelchair)    Discharge Preparedness  What is your plan after discharge?: Uncertain - pending medical team collaboration  What are your discharge supports?: Other (comment)(Pt reports that he has no friends or family)  Prior Functional Level: Wheelchair Dependent  Difficulity with ADLs: Bathing, Dressing, Toileting,  Walking  Difficulity with IADLs: Cooking, Driving, Laundry, Shopping    Functional Assesment  Prior Functional Level: Wheelchair Dependent    Finances  Financial Barriers to Discharge: No  Prescription Coverage: Yes([facility])    Vision / Hearing Impairment  Vision Impairment : Yes  Right Eye Vision: Impaired, Wears Glasses  Left Eye Vision: Impaired, Wears Glasses  Hearing Impairment : No         Advance Directive  Advance Directive?: None    Domestic Abuse  Have you ever been the victim of abuse or violence?: No  Physical Abuse or Sexual Abuse: No  Verbal Abuse or Emotional Abuse: No  Possible Abuse/Neglect Reported to:: Not Applicable    Psychological Assessment  History of Substance Abuse: None  History of Psychiatric Problems: No  Non-compliant with Treatment: No  Newly Diagnosed Illness: No    Discharge Risks or Barriers  Discharge risks or barriers?: Post-acute placement / services, Lives alone, no community support  Patient risk factors: Cognitive / sensory / physical deficit, Lack of outside supports, Lives alone and no community support    Anticipated Discharge Information  Discharge Disposition: Still a Patient (30)

## 2021-03-08 NOTE — PROGRESS NOTES
Assumed care of pt at 1830. Report received by day shift RN. Pt is A&O x 2. Pain is 5 in right shin. Pt is sitting comfortably in bed. Bed in lowest locked position, call light in reach. Handoff given to night shift RN.

## 2021-03-08 NOTE — PROGRESS NOTES
Assumed care of pt at 0700. Report received by NOC RN. Pt is A&O x 2. Pain is 10, however pt was sleeping when I walked in and remains calm. Pt was medicated by NOC RN at 0500 and is not due for more until 0900. Pt is sitting up in bed, prepared for breakfast. Bed in lowest locked position, call light in reach, hourly rounding in place. Labs reviewed, orders reviewed, communication board updated.

## 2021-03-08 NOTE — PROGRESS NOTES
Assumed care at 1900. Received report from day shift RN. Pt is awake in bed, A&Ox 2, disoriented to time and situation, on 1L NC, reports a pain level of 8/10. Fall precautions and appropriate signs in place. Call light and belongings within reach. Bed alarm and hourly rounding in place. Communication board updated. Pt denies any additional needs at this time.

## 2021-03-08 NOTE — PROGRESS NOTES
· 2 RN skin check completed with Regis BOURNE.   · Devices in place N/A.  · Skin assessed under devices N/A.  · Confirmed pressure ulcers found on N/A.  · New potential pressure ulcers noted on N/A. Wound consult placed? N/A. Photo uploaded? N/A.   · The following interventions are in place Pillows in place for support and comfort, non slip socks provided, extra blankets. Skin is generally dry, flaky, fragile, and bruised. Pt's elbows are red and blanching, bilateral heels are cracked and dry. Sacrum is pink and blanching, no extraordinary skin findings.

## 2021-03-08 NOTE — PROGRESS NOTES
[facility] Medicine Daily Progress Note    Date of Service  3/8/2021    Chief Complaint  66 y.o. male admitted 3/5/2021 with past medical history of stroke with right-sided hemiplegia, diabetes, COPD who presents to the ER with a syncope.    Hospital Course  Patient was found to be hypoxic and revealing on 15 L nonrebreather.  He was given duo nebs, steroids and improved his oxygen to 4 L.  CT chest abdomen pelvis revealed no abnormalities.  CT scan T-spine found chronic compression fractures.  CT head found no abnormalities.  Chest x-ray found increased intravascular congestion.  Procalcitonin was 0.17.  Troponin were equivocal.  EKG found sinus tachycardia with poor progression.  Patient was started on steroids and inhalers for COPD exacerbation.    Interval Problem Update  No acute events overnight, on room air during my exam. Denies any shortness of breath. Glucose improved. Patient continues to be hypertensive increased hydralazine dose. OT recommended cognitive evaluation as he is not safe to return to living alone, SLP eval ordered.    Consultants/Specialty  None    Code Status  DNAR/DNI    Disposition  Transfer to medical  Review of Systems  Review of Systems   Constitutional: Negative.  Negative for chills, diaphoresis, fever, malaise/fatigue and weight loss.   HENT: Negative.  Negative for sore throat.    Eyes: Negative.  Negative for blurred vision.   Respiratory: Positive for cough. Negative for sputum production and shortness of breath.    Cardiovascular: Positive for chest pain (right sided chest wall). Negative for palpitations and leg swelling.   Gastrointestinal: Negative.  Negative for abdominal pain, nausea and vomiting.   Genitourinary: Negative.  Negative for dysuria.   Musculoskeletal: Negative.  Negative for myalgias.   Skin: Negative.  Negative for itching and rash.   Neurological: Positive for focal weakness (r sided, per pt stable x 10 years) and headaches. Negative for dizziness and  weakness.   Endo/Heme/Allergies: Negative.  Does not bruise/bleed easily.   Psychiatric/Behavioral: Positive for memory loss. Negative for depression, substance abuse and suicidal ideas.   All other systems reviewed and are negative.       Physical Exam  Temp:  [36.4 °C (97.6 °F)-37 °C (98.6 °F)] 36.7 °C (98.1 °F)  Pulse:  [60-77] 68  Resp:  [18] 18  BP: (156-186)/(76-98) 157/76  SpO2:  [90 %-98 %] 98 %    Physical Exam  Vitals and nursing note reviewed. Exam conducted with a chaperone present.   Constitutional:       General: He is not in acute distress.     Appearance: Normal appearance. He is not diaphoretic.      Comments: Unkempt  Speaking in full sentences with out difficulty   HENT:      Head:      Comments: Bruise to left forehead     Nose: Nose normal.      Mouth/Throat:      Mouth: Mucous membranes are moist.   Eyes:      Pupils: Pupils are equal, round, and reactive to light.   Cardiovascular:      Rate and Rhythm: Normal rate and regular rhythm.      Pulses: Normal pulses.      Heart sounds: Normal heart sounds.   Pulmonary:      Effort: Pulmonary effort is normal. No respiratory distress.      Breath sounds: No wheezing.      Comments: On room air   Chest:      Chest wall: Tenderness (to palpation, right chest, no s/o trauma) present.   Abdominal:      General: Abdomen is flat. Bowel sounds are normal.      Palpations: Abdomen is soft.   Musculoskeletal:         General: No swelling or deformity. Normal range of motion.   Skin:     General: Skin is warm and dry.      Capillary Refill: Capillary refill takes less than 2 seconds.      Findings: Bruising (right shin) present.   Neurological:      Mental Status: He is alert.      Cranial Nerves: No cranial nerve deficit.      Motor: Weakness (right sided hemiparesis, chronic per pt) present.      Comments: Oriented to person and place, not time or situation    Psychiatric:         Mood and Affect: Mood normal.         Behavior: Behavior normal.          Fluids    Intake/Output Summary (Last 24 hours) at 3/8/2021 1428  Last data filed at 3/8/2021 1334  Gross per 24 hour   Intake 840 ml   Output 1500 ml   Net -660 ml       Laboratory  Recent Labs     03/06/21  0112 03/07/21  0045 03/08/21  0042   WBC 10.5 15.4* 15.9*   RBC 4.89 4.90 4.80   HEMOGLOBIN 14.6 14.4 14.5   HEMATOCRIT 45.0 44.5 43.7   MCV 92.0 90.8 91.0   MCH 29.9 29.4 30.2   MCHC 32.4* 32.4* 33.2*   RDW 47.4 46.9 46.0   PLATELETCT 236 274 292   MPV 9.5 9.2 9.1     Recent Labs     03/06/21  0112 03/07/21  0045 03/08/21  0042   SODIUM 130* 134* 135   POTASSIUM 3.8 4.1 3.9   CHLORIDE 94* 98 97   CO2 25 25 25   GLUCOSE 336* 255* 227*   BUN 16 21 22   CREATININE 1.17 0.92 0.89   CALCIUM 8.8 8.9 9.1             Recent Labs     03/06/21 0112   TRIGLYCERIDE 45   HDL 42   LDL 58       Imaging  EC-ECHOCARDIOGRAM COMPLETE W/O CONT   Final Result      CT-CHEST,ABDOMEN,PELVIS WITH   Final Result      1.  No acute abnormality within the chest, abdomen, or pelvis      2.  Hepatic steatosis      3.  Aortic and branch vessel atherosclerotic plaque      4.  Incidental bilateral renal cyst      No follow up imaging is recommended per consensus guidelines of the 2019 ACR Incidental Findings Committee for probably benign incidental simple appearing renal cystic lesion(s) based on imaging criteria.         CT-TSPINE W/O PLUS RECONS   Final Result      Mild wedge deformities of T12 and T2 have a chronic appearance.      Multilevel degenerative changes as above described.         CT-LSPINE W/O PLUS RECONS   Final Result      Mild wedge deformity of T12 has a chronic appearance.      Degenerative changes as above described, including facet arthropathy.      Dorsal displacement of the coccyx which is of indeterminate age and may be chronic.      Atherosclerotic plaque.      CT-HEAD W/O   Final Result      No acute intracranial abnormality is identified.      There are periventricular and subcortical white matter changes  present.  This finding is nonspecific and could be from previous small vessel ischemia, demyelination, or gliosis.      Hypodensity in the jaxon likely related to a prior lacunar infarct.      Encephalomalacia in the left occipital lobe is likely related to a prior infarct.      Left frontal scalp hematoma.      CT-CSPINE WITHOUT PLUS RECONS   Final Result      Multilevel degenerative changes as above described.      Minimal grade 1 anterolisthesis of C5 on C6.      Minimal loss of height of T2 has a chronic appearance.         DX-CHEST-LIMITED (1 VIEW)   Final Result      Mild pulmonary vascular congestion.      Atherosclerotic plaque.      Elevation of the right hemidiaphragm.              Assessment/Plan  * Acute exacerbation of chronic obstructive pulmonary disease (COPD) ([facility])  Assessment & Plan  Acute on chronic exacerbation  With suspected pneumonitis - doxy started  Not on o2 at baseline  Continue steroids and respiratory protocol    Discharge planning issues  Assessment & Plan  Patient oriented x2   OT- Not safe to DC home as patient is unable to complete ADLs  SLP cognitive eval pending   CM to help find NOK     Essential hypertension  Assessment & Plan  Uncontrolled  Continue current home medications  IV as needed medications have been ordered  Increased hydralazine     Weakness  Assessment & Plan  Acute on chronic  Wheelchair bound at baseline  Chronic r hemiparesis    Encephalopathy  Assessment & Plan  Baseline unknown  Unclear if he had a concussion  CT negative  [facility]  Neuro checks  Seizure precautions  Following    SLP cognitive eval pending     DM (diabetes mellitus) ([facility])  Assessment & Plan  With hyperglycemia  a1c 7.8  Hold oral meds  SSI with hypoglycemic protocol  Diabetic diet    Closed head injury  Assessment & Plan  Evidence per [facility]  Found on ground with abrasion on head with stove in front of him dented  Suspect occurred during syncopal event  CT with no acute  abnormalities    Leukocytosis  Assessment & Plan  Likely 2/2 steroids  Continue doxycycline   Continue to monitor    Syncope  Assessment & Plan  With LOC  Hypoxia likely contributed  Echo- EF 55%      Acute respiratory failure with hypoxia (HCC)  Assessment & Plan  On room air today, will monitor  resolved       VTE prophylaxis: lovenox

## 2021-03-08 NOTE — CARE PLAN
Problem: Safety  Goal: Will remain free from falls  Outcome: PROGRESSING AS EXPECTED  Note: Non slip socks provided to pt, call light and belongings within reach, bed alarm and frame alarm on, fall risk education given, hourly rounding in place.      Problem: Respiratory:  Goal: Respiratory status will improve  Outcome: PROGRESSING AS EXPECTED  Note: Pt reporting 9/10 pain, pt medicated per MAR. Upon reassessment, pt is resting peacefully with no obvious s/sx of distress or discomfort.

## 2021-03-09 DIAGNOSIS — Z23 NEED FOR VACCINATION: ICD-10-CM

## 2021-03-09 LAB
ALBUMIN SERPL BCP-MCNC: 3.5 G/DL (ref 3.2–4.9)
ALBUMIN/GLOB SERPL: 1.1 G/DL
ALP SERPL-CCNC: 80 U/L (ref 30–99)
ALT SERPL-CCNC: 13 U/L (ref 2–50)
ANION GAP SERPL CALC-SCNC: 11 MMOL/L (ref 7–16)
AST SERPL-CCNC: 11 U/L (ref 12–45)
BASOPHILS # BLD AUTO: 0.1 % (ref 0–1.8)
BASOPHILS # BLD: 0.01 K/UL (ref 0–0.12)
BILIRUB SERPL-MCNC: 0.5 MG/DL (ref 0.1–1.5)
BUN SERPL-MCNC: 20 MG/DL (ref 8–22)
CALCIUM SERPL-MCNC: 9 MG/DL (ref 8.5–10.5)
CHLORIDE SERPL-SCNC: 102 MMOL/L (ref 96–112)
CO2 SERPL-SCNC: 25 MMOL/L (ref 20–33)
CREAT SERPL-MCNC: 0.86 MG/DL (ref 0.5–1.4)
EOSINOPHIL # BLD AUTO: 0.04 K/UL (ref 0–0.51)
EOSINOPHIL NFR BLD: 0.3 % (ref 0–6.9)
ERYTHROCYTE [DISTWIDTH] IN BLOOD BY AUTOMATED COUNT: 46.4 FL (ref 35.9–50)
GLOBULIN SER CALC-MCNC: 3.2 G/DL (ref 1.9–3.5)
GLUCOSE BLD-MCNC: 128 MG/DL (ref 65–99)
GLUCOSE BLD-MCNC: 134 MG/DL (ref 65–99)
GLUCOSE BLD-MCNC: 202 MG/DL (ref 65–99)
GLUCOSE BLD-MCNC: 250 MG/DL (ref 65–99)
GLUCOSE SERPL-MCNC: 170 MG/DL (ref 65–99)
HCT VFR BLD AUTO: 48.6 % (ref 42–52)
HGB BLD-MCNC: 16.2 G/DL (ref 14–18)
IMM GRANULOCYTES # BLD AUTO: 0.07 K/UL (ref 0–0.11)
IMM GRANULOCYTES NFR BLD AUTO: 0.5 % (ref 0–0.9)
LYMPHOCYTES # BLD AUTO: 1.23 K/UL (ref 1–4.8)
LYMPHOCYTES NFR BLD: 9.3 % (ref 22–41)
MCH RBC QN AUTO: 30.3 PG (ref 27–33)
MCHC RBC AUTO-ENTMCNC: 33.3 G/DL (ref 33.7–35.3)
MCV RBC AUTO: 90.8 FL (ref 81.4–97.8)
MONOCYTES # BLD AUTO: 0.46 K/UL (ref 0–0.85)
MONOCYTES NFR BLD AUTO: 3.5 % (ref 0–13.4)
NEUTROPHILS # BLD AUTO: 11.37 K/UL (ref 1.82–7.42)
NEUTROPHILS NFR BLD: 86.3 % (ref 44–72)
NRBC # BLD AUTO: 0 K/UL
NRBC BLD-RTO: 0 /100 WBC
PLATELET # BLD AUTO: 304 K/UL (ref 164–446)
PMV BLD AUTO: 9.1 FL (ref 9–12.9)
POTASSIUM SERPL-SCNC: 3.8 MMOL/L (ref 3.6–5.5)
PROT SERPL-MCNC: 6.7 G/DL (ref 6–8.2)
RBC # BLD AUTO: 5.35 M/UL (ref 4.7–6.1)
SODIUM SERPL-SCNC: 138 MMOL/L (ref 135–145)
WBC # BLD AUTO: 13.2 K/UL (ref 4.8–10.8)

## 2021-03-09 PROCEDURE — 700111 HCHG RX REV CODE 636 W/ 250 OVERRIDE (IP): Performed by: INTERNAL MEDICINE

## 2021-03-09 PROCEDURE — 94760 N-INVAS EAR/PLS OXIMETRY 1: CPT

## 2021-03-09 PROCEDURE — 94669 MECHANICAL CHEST WALL OSCILL: CPT

## 2021-03-09 PROCEDURE — 700102 HCHG RX REV CODE 250 W/ 637 OVERRIDE(OP): Performed by: HOSPITALIST

## 2021-03-09 PROCEDURE — 85025 COMPLETE CBC W/AUTO DIFF WBC: CPT

## 2021-03-09 PROCEDURE — 700101 HCHG RX REV CODE 250: Performed by: HOSPITALIST

## 2021-03-09 PROCEDURE — 36415 COLL VENOUS BLD VENIPUNCTURE: CPT

## 2021-03-09 PROCEDURE — 99233 SBSQ HOSP IP/OBS HIGH 50: CPT | Performed by: HOSPITALIST

## 2021-03-09 PROCEDURE — A9270 NON-COVERED ITEM OR SERVICE: HCPCS | Performed by: HOSPITALIST

## 2021-03-09 PROCEDURE — 700111 HCHG RX REV CODE 636 W/ 250 OVERRIDE (IP): Performed by: HOSPITALIST

## 2021-03-09 PROCEDURE — 770006 HCHG ROOM/CARE - MED/SURG/GYN SEMI*

## 2021-03-09 PROCEDURE — 82962 GLUCOSE BLOOD TEST: CPT | Mod: 91

## 2021-03-09 PROCEDURE — A9270 NON-COVERED ITEM OR SERVICE: HCPCS | Performed by: INTERNAL MEDICINE

## 2021-03-09 PROCEDURE — 700102 HCHG RX REV CODE 250 W/ 637 OVERRIDE(OP): Performed by: INTERNAL MEDICINE

## 2021-03-09 PROCEDURE — 94640 AIRWAY INHALATION TREATMENT: CPT

## 2021-03-09 PROCEDURE — 80053 COMPREHEN METABOLIC PANEL: CPT

## 2021-03-09 RX ORDER — IPRATROPIUM BROMIDE AND ALBUTEROL SULFATE 2.5; .5 MG/3ML; MG/3ML
3 SOLUTION RESPIRATORY (INHALATION)
Status: DISCONTINUED | OUTPATIENT
Start: 2021-03-09 | End: 2021-03-12

## 2021-03-09 RX ADMIN — INSULIN HUMAN 4 UNITS: 100 INJECTION, SOLUTION PARENTERAL at 17:10

## 2021-03-09 RX ADMIN — LIDOCAINE 1 PATCH: 50 PATCH TOPICAL at 11:42

## 2021-03-09 RX ADMIN — HYDRALAZINE HYDROCHLORIDE 25 MG: 25 TABLET, FILM COATED ORAL at 14:27

## 2021-03-09 RX ADMIN — DOXYCYCLINE 100 MG: 100 TABLET, FILM COATED ORAL at 08:11

## 2021-03-09 RX ADMIN — IPRATROPIUM BROMIDE AND ALBUTEROL SULFATE 3 ML: .5; 2.5 SOLUTION RESPIRATORY (INHALATION) at 11:46

## 2021-03-09 RX ADMIN — ASPIRIN 81 MG: 81 TABLET, COATED ORAL at 05:15

## 2021-03-09 RX ADMIN — ATORVASTATIN CALCIUM 40 MG: 40 TABLET, FILM COATED ORAL at 20:54

## 2021-03-09 RX ADMIN — AMLODIPINE BESYLATE 10 MG: 10 TABLET ORAL at 05:15

## 2021-03-09 RX ADMIN — HYDRALAZINE HYDROCHLORIDE 25 MG: 25 TABLET, FILM COATED ORAL at 05:15

## 2021-03-09 RX ADMIN — IPRATROPIUM BROMIDE AND ALBUTEROL SULFATE 3 ML: .5; 2.5 SOLUTION RESPIRATORY (INHALATION) at 07:56

## 2021-03-09 RX ADMIN — OXYCODONE 5 MG: 5 TABLET ORAL at 14:29

## 2021-03-09 RX ADMIN — LABETALOL HYDROCHLORIDE 10 MG: 5 INJECTION, SOLUTION INTRAVENOUS at 04:12

## 2021-03-09 RX ADMIN — DOXYCYCLINE 100 MG: 100 TABLET, FILM COATED ORAL at 20:54

## 2021-03-09 RX ADMIN — OXYCODONE 5 MG: 5 TABLET ORAL at 04:11

## 2021-03-09 RX ADMIN — HYDRALAZINE HYDROCHLORIDE 25 MG: 25 TABLET, FILM COATED ORAL at 20:54

## 2021-03-09 RX ADMIN — TIOTROPIUM BROMIDE INHALATION SPRAY 5 MCG: 3.12 SPRAY, METERED RESPIRATORY (INHALATION) at 05:16

## 2021-03-09 RX ADMIN — METHYLPREDNISOLONE SODIUM SUCCINATE 40 MG: 40 INJECTION, POWDER, FOR SOLUTION INTRAMUSCULAR; INTRAVENOUS at 05:16

## 2021-03-09 RX ADMIN — SITAGLIPTIN 100 MG: 100 TABLET, FILM COATED ORAL at 05:15

## 2021-03-09 RX ADMIN — VALSARTAN 320 MG: 80 TABLET, FILM COATED ORAL at 20:55

## 2021-03-09 RX ADMIN — IPRATROPIUM BROMIDE AND ALBUTEROL SULFATE 3 ML: .5; 2.5 SOLUTION RESPIRATORY (INHALATION) at 19:50

## 2021-03-09 RX ADMIN — DOCUSATE SODIUM 50 MG AND SENNOSIDES 8.6 MG 2 TABLET: 8.6; 5 TABLET, FILM COATED ORAL at 05:14

## 2021-03-09 RX ADMIN — IPRATROPIUM BROMIDE AND ALBUTEROL SULFATE 3 ML: .5; 2.5 SOLUTION RESPIRATORY (INHALATION) at 15:48

## 2021-03-09 RX ADMIN — GABAPENTIN 600 MG: 300 CAPSULE ORAL at 20:54

## 2021-03-09 RX ADMIN — ENOXAPARIN SODIUM 40 MG: 40 INJECTION SUBCUTANEOUS at 05:14

## 2021-03-09 RX ADMIN — CLOPIDOGREL BISULFATE 75 MG: 75 TABLET ORAL at 05:15

## 2021-03-09 RX ADMIN — OMEPRAZOLE 40 MG: 20 CAPSULE, DELAYED RELEASE ORAL at 05:14

## 2021-03-09 RX ADMIN — INSULIN HUMAN 4 UNITS: 100 INJECTION, SOLUTION PARENTERAL at 11:37

## 2021-03-09 RX ADMIN — KETOROLAC TROMETHAMINE 15 MG: 30 INJECTION, SOLUTION INTRAMUSCULAR; INTRAVENOUS at 21:01

## 2021-03-09 ASSESSMENT — ENCOUNTER SYMPTOMS
COUGH: 1
EYES NEGATIVE: 1
VOMITING: 0
ABDOMINAL PAIN: 0
SHORTNESS OF BREATH: 0
BRUISES/BLEEDS EASILY: 0
FEVER: 0
SORE THROAT: 0
WEIGHT LOSS: 0
SPUTUM PRODUCTION: 0
CONSTITUTIONAL NEGATIVE: 1
DIZZINESS: 0
WEAKNESS: 0
MEMORY LOSS: 1
MUSCULOSKELETAL NEGATIVE: 1
HEADACHES: 1
MYALGIAS: 0
FOCAL WEAKNESS: 1
CHILLS: 0
GASTROINTESTINAL NEGATIVE: 1
BLURRED VISION: 0
DEPRESSION: 0
NAUSEA: 0
PALPITATIONS: 0
DIAPHORESIS: 0

## 2021-03-09 ASSESSMENT — LIFESTYLE VARIABLES: SUBSTANCE_ABUSE: 0

## 2021-03-09 ASSESSMENT — PAIN SCALES - WONG BAKER
WONGBAKER_NUMERICALRESPONSE: HURTS A LITTLE MORE
WONGBAKER_NUMERICALRESPONSE: HURTS A LITTLE MORE

## 2021-03-09 ASSESSMENT — PATIENT HEALTH QUESTIONNAIRE - PHQ9
SUM OF ALL RESPONSES TO PHQ9 QUESTIONS 1 AND 2: 0
2. FEELING DOWN, DEPRESSED, IRRITABLE, OR HOPELESS: NOT AT ALL
1. LITTLE INTEREST OR PLEASURE IN DOING THINGS: NOT AT ALL

## 2021-03-09 ASSESSMENT — PAIN DESCRIPTION - PAIN TYPE
TYPE: ACUTE PAIN

## 2021-03-09 NOTE — PROGRESS NOTES
"Received bedside report from Night RN. Pt awake and alert. Patient flaccid in right sided extremities. Slow speech. Bed alarm in use. No complains of pain at this time. Discussed plan of care. Call bell in reach and bed locked in the lowest position. /68   Pulse 71   Temp 36.4 °C (97.5 °F) (Temporal)   Resp 16   Ht 1.803 m (5' 11\")   Wt 96 kg (211 lb 10.3 oz)   SpO2 91%   BMI 29.52 kg/m²     "

## 2021-03-09 NOTE — CARE PLAN
Respiratory Update    Treatment modality: SVN  Frequency: DUO Q4hrs      Pt tolerating current treatments well with no adverse reactions.

## 2021-03-09 NOTE — DISCHARGE PLANNING
Anticipated Discharge Disposition: TBD    Action: LSW called Pt's [facility]  Paola at [facility] to discuss Pts services and needs. LSW received Paola's voicemail with a message that she will be out until 3/15 and to call her supervisor Zen at [phone].     LSW called this number and left a message.     Barriers to Discharge: Pt needs 24/7 support    Plan: LSW to discuss Pt's current and potential services with the [facility] to try and set up a plan for him to return home.

## 2021-03-09 NOTE — CARE PLAN
Problem: Safety  Goal: Will remain free from injury  Outcome: PROGRESSING AS EXPECTED   Provide call bell for assistance.      Problem: Pain Management  Goal: Pain level will decrease to patient's comfort goal  Outcome: PROGRESSING AS EXPECTED   Monitor and treat pain

## 2021-03-09 NOTE — PALLIATIVE CARE
PALLIATIVE CARE FOLLOW-UP:    [facility]. They do not have one on file.    Met with pt who stated that he is agreeable to complete AD and would like to name his sister Dolly to be his DPOA-HC. Pt provided sister's phone number which seemed nearly correct as compared to NOK search, except for different area code ([phone] vs [phone]). Pt chose Statements of Desire #2, #3 and #5 and included the same wishes from his POLST - DNR, DNI, artificial nutrition x1 week. Certificate of Competency and thus, notary service pending MD signature.    Attempted to reach pt's sister but neither number above are correct - [phone] is non-working, [phone] is work number for a different person.    Discussed with/Updated: Dr. Garcia, ANJUM De La Cruz        Plan:  Palliative Care to continue to follow, provide support, and help facilitate decision-making as clinical picture evolves.    Thank you for allowing Palliative Care to support this pt and his family.  Contact [phone] for additional assistance, patient status change, questions or concerns.

## 2021-03-09 NOTE — CARE PLAN
Problem: Communication  Goal: The ability to communicate needs accurately and effectively will improve  Outcome: PROGRESSING AS EXPECTED     Problem: Safety  Goal: Will remain free from injury  Outcome: PROGRESSING AS EXPECTED     Problem: Knowledge Deficit  Goal: Knowledge of disease process/condition, treatment plan, diagnostic tests, and medications will improve  Outcome: PROGRESSING SLOWER THAN EXPECTED     Problem: Discharge Barriers/Planning  Goal: Patient's continuum of care needs will be met  Outcome: PROGRESSING SLOWER THAN EXPECTED

## 2021-03-10 LAB
ALBUMIN SERPL BCP-MCNC: 3.2 G/DL (ref 3.2–4.9)
ALBUMIN/GLOB SERPL: 1 G/DL
ALP SERPL-CCNC: 81 U/L (ref 30–99)
ALT SERPL-CCNC: 10 U/L (ref 2–50)
ANION GAP SERPL CALC-SCNC: 14 MMOL/L (ref 7–16)
AST SERPL-CCNC: 11 U/L (ref 12–45)
BACTERIA BLD CULT: NORMAL
BACTERIA BLD CULT: NORMAL
BASOPHILS # BLD AUTO: 0.4 % (ref 0–1.8)
BASOPHILS # BLD: 0.04 K/UL (ref 0–0.12)
BILIRUB SERPL-MCNC: 0.4 MG/DL (ref 0.1–1.5)
BUN SERPL-MCNC: 26 MG/DL (ref 8–22)
CALCIUM SERPL-MCNC: 8.9 MG/DL (ref 8.5–10.5)
CHLORIDE SERPL-SCNC: 101 MMOL/L (ref 96–112)
CO2 SERPL-SCNC: 17 MMOL/L (ref 20–33)
CREAT SERPL-MCNC: 0.94 MG/DL (ref 0.5–1.4)
EOSINOPHIL # BLD AUTO: 0.08 K/UL (ref 0–0.51)
EOSINOPHIL NFR BLD: 0.7 % (ref 0–6.9)
ERYTHROCYTE [DISTWIDTH] IN BLOOD BY AUTOMATED COUNT: 48.1 FL (ref 35.9–50)
GLOBULIN SER CALC-MCNC: 3.1 G/DL (ref 1.9–3.5)
GLUCOSE BLD-MCNC: 115 MG/DL (ref 65–99)
GLUCOSE BLD-MCNC: 142 MG/DL (ref 65–99)
GLUCOSE BLD-MCNC: 151 MG/DL (ref 65–99)
GLUCOSE BLD-MCNC: 342 MG/DL (ref 65–99)
GLUCOSE SERPL-MCNC: 136 MG/DL (ref 65–99)
HCT VFR BLD AUTO: 51.5 % (ref 42–52)
HGB BLD-MCNC: 16.1 G/DL (ref 14–18)
IMM GRANULOCYTES # BLD AUTO: 0.08 K/UL (ref 0–0.11)
IMM GRANULOCYTES NFR BLD AUTO: 0.7 % (ref 0–0.9)
LYMPHOCYTES # BLD AUTO: 2.88 K/UL (ref 1–4.8)
LYMPHOCYTES NFR BLD: 26.3 % (ref 22–41)
MCH RBC QN AUTO: 29.6 PG (ref 27–33)
MCHC RBC AUTO-ENTMCNC: 31.3 G/DL (ref 33.7–35.3)
MCV RBC AUTO: 94.7 FL (ref 81.4–97.8)
MONOCYTES # BLD AUTO: 0.92 K/UL (ref 0–0.85)
MONOCYTES NFR BLD AUTO: 8.4 % (ref 0–13.4)
NEUTROPHILS # BLD AUTO: 6.94 K/UL (ref 1.82–7.42)
NEUTROPHILS NFR BLD: 63.5 % (ref 44–72)
NRBC # BLD AUTO: 0 K/UL
NRBC BLD-RTO: 0 /100 WBC
PLATELET # BLD AUTO: 290 K/UL (ref 164–446)
PMV BLD AUTO: 9.5 FL (ref 9–12.9)
POTASSIUM SERPL-SCNC: 4.2 MMOL/L (ref 3.6–5.5)
PROT SERPL-MCNC: 6.3 G/DL (ref 6–8.2)
RBC # BLD AUTO: 5.44 M/UL (ref 4.7–6.1)
SIGNIFICANT IND 70042: NORMAL
SIGNIFICANT IND 70042: NORMAL
SITE SITE: NORMAL
SITE SITE: NORMAL
SODIUM SERPL-SCNC: 132 MMOL/L (ref 135–145)
SOURCE SOURCE: NORMAL
SOURCE SOURCE: NORMAL
WBC # BLD AUTO: 10.9 K/UL (ref 4.8–10.8)

## 2021-03-10 PROCEDURE — 700102 HCHG RX REV CODE 250 W/ 637 OVERRIDE(OP): Performed by: HOSPITALIST

## 2021-03-10 PROCEDURE — 700101 HCHG RX REV CODE 250: Performed by: HOSPITALIST

## 2021-03-10 PROCEDURE — A9270 NON-COVERED ITEM OR SERVICE: HCPCS | Performed by: HOSPITALIST

## 2021-03-10 PROCEDURE — 82962 GLUCOSE BLOOD TEST: CPT | Mod: 91

## 2021-03-10 PROCEDURE — 94760 N-INVAS EAR/PLS OXIMETRY 1: CPT

## 2021-03-10 PROCEDURE — 700102 HCHG RX REV CODE 250 W/ 637 OVERRIDE(OP): Performed by: INTERNAL MEDICINE

## 2021-03-10 PROCEDURE — 80053 COMPREHEN METABOLIC PANEL: CPT

## 2021-03-10 PROCEDURE — 99231 SBSQ HOSP IP/OBS SF/LOW 25: CPT | Performed by: HOSPITALIST

## 2021-03-10 PROCEDURE — 700111 HCHG RX REV CODE 636 W/ 250 OVERRIDE (IP): Performed by: INTERNAL MEDICINE

## 2021-03-10 PROCEDURE — A9270 NON-COVERED ITEM OR SERVICE: HCPCS | Performed by: INTERNAL MEDICINE

## 2021-03-10 PROCEDURE — 94669 MECHANICAL CHEST WALL OSCILL: CPT

## 2021-03-10 PROCEDURE — 700111 HCHG RX REV CODE 636 W/ 250 OVERRIDE (IP): Performed by: HOSPITALIST

## 2021-03-10 PROCEDURE — 94640 AIRWAY INHALATION TREATMENT: CPT

## 2021-03-10 PROCEDURE — 770006 HCHG ROOM/CARE - MED/SURG/GYN SEMI*

## 2021-03-10 PROCEDURE — 85025 COMPLETE CBC W/AUTO DIFF WBC: CPT

## 2021-03-10 PROCEDURE — 36415 COLL VENOUS BLD VENIPUNCTURE: CPT

## 2021-03-10 RX ADMIN — ATORVASTATIN CALCIUM 40 MG: 40 TABLET, FILM COATED ORAL at 21:14

## 2021-03-10 RX ADMIN — IPRATROPIUM BROMIDE AND ALBUTEROL SULFATE 3 ML: .5; 2.5 SOLUTION RESPIRATORY (INHALATION) at 19:28

## 2021-03-10 RX ADMIN — GABAPENTIN 600 MG: 300 CAPSULE ORAL at 21:14

## 2021-03-10 RX ADMIN — IPRATROPIUM BROMIDE AND ALBUTEROL SULFATE 3 ML: .5; 2.5 SOLUTION RESPIRATORY (INHALATION) at 08:48

## 2021-03-10 RX ADMIN — TIOTROPIUM BROMIDE INHALATION SPRAY 5 MCG: 3.12 SPRAY, METERED RESPIRATORY (INHALATION) at 08:12

## 2021-03-10 RX ADMIN — ENOXAPARIN SODIUM 40 MG: 40 INJECTION SUBCUTANEOUS at 04:39

## 2021-03-10 RX ADMIN — OMEPRAZOLE 40 MG: 20 CAPSULE, DELAYED RELEASE ORAL at 04:38

## 2021-03-10 RX ADMIN — INSULIN HUMAN 3 UNITS: 100 INJECTION, SOLUTION PARENTERAL at 17:21

## 2021-03-10 RX ADMIN — HYDRALAZINE HYDROCHLORIDE 25 MG: 25 TABLET, FILM COATED ORAL at 21:14

## 2021-03-10 RX ADMIN — LIDOCAINE 1 PATCH: 50 PATCH TOPICAL at 12:29

## 2021-03-10 RX ADMIN — INSULIN HUMAN 10 UNITS: 100 INJECTION, SOLUTION PARENTERAL at 12:24

## 2021-03-10 RX ADMIN — VALSARTAN 320 MG: 80 TABLET, FILM COATED ORAL at 21:14

## 2021-03-10 RX ADMIN — CLOPIDOGREL BISULFATE 75 MG: 75 TABLET ORAL at 04:38

## 2021-03-10 RX ADMIN — SITAGLIPTIN 100 MG: 100 TABLET, FILM COATED ORAL at 04:38

## 2021-03-10 RX ADMIN — DOCUSATE SODIUM 50 MG AND SENNOSIDES 8.6 MG 2 TABLET: 8.6; 5 TABLET, FILM COATED ORAL at 17:25

## 2021-03-10 RX ADMIN — IPRATROPIUM BROMIDE AND ALBUTEROL SULFATE 3 ML: .5; 2.5 SOLUTION RESPIRATORY (INHALATION) at 11:34

## 2021-03-10 RX ADMIN — ASPIRIN 81 MG: 81 TABLET, COATED ORAL at 04:38

## 2021-03-10 RX ADMIN — OXYCODONE 5 MG: 5 TABLET ORAL at 21:14

## 2021-03-10 RX ADMIN — OXYCODONE 5 MG: 5 TABLET ORAL at 14:06

## 2021-03-10 RX ADMIN — DOCUSATE SODIUM 50 MG AND SENNOSIDES 8.6 MG 2 TABLET: 8.6; 5 TABLET, FILM COATED ORAL at 04:38

## 2021-03-10 RX ADMIN — HYDRALAZINE HYDROCHLORIDE 25 MG: 25 TABLET, FILM COATED ORAL at 14:00

## 2021-03-10 RX ADMIN — AMLODIPINE BESYLATE 10 MG: 10 TABLET ORAL at 04:38

## 2021-03-10 RX ADMIN — DOXYCYCLINE 100 MG: 100 TABLET, FILM COATED ORAL at 08:11

## 2021-03-10 RX ADMIN — IPRATROPIUM BROMIDE AND ALBUTEROL SULFATE 3 ML: .5; 2.5 SOLUTION RESPIRATORY (INHALATION) at 14:46

## 2021-03-10 RX ADMIN — METHYLPREDNISOLONE SODIUM SUCCINATE 40 MG: 40 INJECTION, POWDER, FOR SOLUTION INTRAMUSCULAR; INTRAVENOUS at 04:39

## 2021-03-10 RX ADMIN — HYDRALAZINE HYDROCHLORIDE 25 MG: 25 TABLET, FILM COATED ORAL at 04:38

## 2021-03-10 ASSESSMENT — ENCOUNTER SYMPTOMS
DIZZINESS: 0
MYALGIAS: 0
VOMITING: 0
SHORTNESS OF BREATH: 0
WEAKNESS: 0
HEADACHES: 1
ABDOMINAL PAIN: 0
NAUSEA: 0
COUGH: 1
CHILLS: 0
DIAPHORESIS: 0
WEIGHT LOSS: 0
MUSCULOSKELETAL NEGATIVE: 1
DEPRESSION: 0
GASTROINTESTINAL NEGATIVE: 1
FEVER: 0
EYES NEGATIVE: 1
BLURRED VISION: 0
SORE THROAT: 0
BRUISES/BLEEDS EASILY: 0
CONSTITUTIONAL NEGATIVE: 1
PALPITATIONS: 0
SPUTUM PRODUCTION: 0
FOCAL WEAKNESS: 1
MEMORY LOSS: 1

## 2021-03-10 ASSESSMENT — PAIN DESCRIPTION - PAIN TYPE
TYPE: ACUTE PAIN

## 2021-03-10 ASSESSMENT — LIFESTYLE VARIABLES: SUBSTANCE_ABUSE: 0

## 2021-03-10 NOTE — CARE PLAN
Problem: Bronchoconstriction:  Goal: Improve in air movement and diminished wheezing  Outcome: PROGRESSING AS EXPECTED    Patient receives SVN with Duoneb, now QID + PRN and Spiriva Qday.

## 2021-03-10 NOTE — PROGRESS NOTES
[facility] Medicine Daily Progress Note    Date of Service  3/9/2021    Chief Complaint  66 y.o. male admitted 3/5/2021 with past medical history of stroke with right-sided hemiplegia, diabetes, COPD who presents to the ER with a syncope.    Hospital Course  Patient was found to be hypoxic and revealing on 15 L nonrebreather.  He was given duo nebs, steroids and improved his oxygen to 4 L.  CT chest abdomen pelvis revealed no abnormalities.  CT scan T-spine found chronic compression fractures.  CT head found no abnormalities.  Chest x-ray found increased intravascular congestion.  Procalcitonin was 0.17.  Troponin were equivocal.  EKG found sinus tachycardia with poor progression.  Patient was started on steroids and inhalers for COPD exacerbation.    Interval Problem Update  No acute events overnight, on room air during my exam. Denies any shortness of breath. Glucose improved. HTN improved.  SLP eval results appreciated.    Consultants/Specialty  None    Code Status  DNAR/DNI    Disposition  Will need 24/7 supervision per SLP upon discharge.  This will require SNF placement as this cannot be achieved in the community with his current supports.      Review of Systems  Review of Systems   Constitutional: Negative.  Negative for chills, diaphoresis, fever, malaise/fatigue and weight loss.   HENT: Negative.  Negative for sore throat.    Eyes: Negative.  Negative for blurred vision.   Respiratory: Positive for cough. Negative for sputum production and shortness of breath.    Cardiovascular: Positive for chest pain (right sided chest wall). Negative for palpitations and leg swelling.   Gastrointestinal: Negative.  Negative for abdominal pain, nausea and vomiting.   Genitourinary: Negative.  Negative for dysuria.   Musculoskeletal: Negative.  Negative for myalgias.   Skin: Negative.  Negative for itching and rash.   Neurological: Positive for focal weakness (r sided, per pt stable x 10 years) and headaches. Negative for  dizziness and weakness.   Endo/Heme/Allergies: Negative.  Does not bruise/bleed easily.   Psychiatric/Behavioral: Positive for memory loss. Negative for depression, substance abuse and suicidal ideas.   All other systems reviewed and are negative.       Physical Exam  Temp:  [36.2 °C (97.1 °F)-36.6 °C (97.8 °F)] 36.3 °C (97.3 °F)  Pulse:  [71-87] 87  Resp:  [16-18] 18  BP: (135-180)/(68-95) 171/69  SpO2:  [91 %-95 %] 93 %    Physical Exam  Vitals and nursing note reviewed. Exam conducted with a chaperone present.   Constitutional:       General: He is not in acute distress.     Appearance: Normal appearance. He is not diaphoretic.      Comments: Unkempt  Speaking in full sentences with out difficulty   HENT:      Head:      Comments: Bruise to left forehead     Nose: Nose normal.      Mouth/Throat:      Mouth: Mucous membranes are moist.   Eyes:      Pupils: Pupils are equal, round, and reactive to light.   Cardiovascular:      Rate and Rhythm: Normal rate and regular rhythm.      Pulses: Normal pulses.      Heart sounds: Normal heart sounds.   Pulmonary:      Effort: Pulmonary effort is normal. No respiratory distress.      Breath sounds: No wheezing.      Comments: On room air   Chest:      Chest wall: Tenderness (to palpation, right chest, no s/o trauma) present.   Abdominal:      General: Abdomen is flat. Bowel sounds are normal.      Palpations: Abdomen is soft.   Musculoskeletal:         General: No swelling or deformity. Normal range of motion.   Skin:     General: Skin is warm and dry.      Capillary Refill: Capillary refill takes less than 2 seconds.      Findings: Bruising (right shin) present.   Neurological:      Mental Status: He is alert.      Cranial Nerves: No cranial nerve deficit.      Motor: Weakness (right sided hemiparesis, chronic per pt) present.      Comments: Oriented to person and place, not time or situation    Psychiatric:         Mood and Affect: Mood normal.         Behavior: Behavior  normal.         Fluids    Intake/Output Summary (Last 24 hours) at 3/9/2021 1709  Last data filed at 3/9/2021 1430  Gross per 24 hour   Intake 480 ml   Output 950 ml   Net -470 ml       Laboratory  Recent Labs     03/07/21 0045 03/08/21 0042 03/09/21  0752   WBC 15.4* 15.9* 13.2*   RBC 4.90 4.80 5.35   HEMOGLOBIN 14.4 14.5 16.2   HEMATOCRIT 44.5 43.7 48.6   MCV 90.8 91.0 90.8   MCH 29.4 30.2 30.3   MCHC 32.4* 33.2* 33.3*   RDW 46.9 46.0 46.4   PLATELETCT 274 292 304   MPV 9.2 9.1 9.1     Recent Labs     03/07/21 0045 03/08/21 0042 03/09/21  0752   SODIUM 134* 135 138   POTASSIUM 4.1 3.9 3.8   CHLORIDE 98 97 102   CO2 25 25 25   GLUCOSE 255* 227* 170*   BUN 21 22 20   CREATININE 0.92 0.89 0.86   CALCIUM 8.9 9.1 9.0                   Imaging  EC-ECHOCARDIOGRAM COMPLETE W/O CONT   Final Result      CT-CHEST,ABDOMEN,PELVIS WITH   Final Result      1.  No acute abnormality within the chest, abdomen, or pelvis      2.  Hepatic steatosis      3.  Aortic and branch vessel atherosclerotic plaque      4.  Incidental bilateral renal cyst      No follow up imaging is recommended per consensus guidelines of the 2019 ACR Incidental Findings Committee for probably benign incidental simple appearing renal cystic lesion(s) based on imaging criteria.         CT-TSPINE W/O PLUS RECONS   Final Result      Mild wedge deformities of T12 and T2 have a chronic appearance.      Multilevel degenerative changes as above described.         CT-LSPINE W/O PLUS RECONS   Final Result      Mild wedge deformity of T12 has a chronic appearance.      Degenerative changes as above described, including facet arthropathy.      Dorsal displacement of the coccyx which is of indeterminate age and may be chronic.      Atherosclerotic plaque.      CT-HEAD W/O   Final Result      No acute intracranial abnormality is identified.      There are periventricular and subcortical white matter changes present.  This finding is nonspecific and could be from  previous small vessel ischemia, demyelination, or gliosis.      Hypodensity in the jaxon likely related to a prior lacunar infarct.      Encephalomalacia in the left occipital lobe is likely related to a prior infarct.      Left frontal scalp hematoma.      CT-CSPINE WITHOUT PLUS RECONS   Final Result      Multilevel degenerative changes as above described.      Minimal grade 1 anterolisthesis of C5 on C6.      Minimal loss of height of T2 has a chronic appearance.         DX-CHEST-LIMITED (1 VIEW)   Final Result      Mild pulmonary vascular congestion.      Atherosclerotic plaque.      Elevation of the right hemidiaphragm.              Assessment/Plan  * Acute exacerbation of chronic obstructive pulmonary disease (COPD) ([facility])  Assessment & Plan  Acute on chronic exacerbation  With suspected pneumonitis - doxy started  Not on o2 at baseline  Continue steroids and respiratory protocol    Discharge planning issues  Assessment & Plan  Patient oriented x2   OT- Not safe to DC home as patient is unable to complete ADLs  SLP cognitive eval pending   CM to help find NOK     Essential hypertension  Assessment & Plan  Uncontrolled  Continue current home medications  IV as needed medications have been ordered  Increased hydralazine     Weakness  Assessment & Plan  Acute on chronic  Wheelchair bound at baseline  Chronic r hemiparesis    Encephalopathy  Assessment & Plan  Baseline unknown  Unclear if he had a concussion  CT negative  [facility]  Neuro checks  Seizure precautions  Following    SLP cognitive eval pending     DM (diabetes mellitus) ([facility])  Assessment & Plan  With hyperglycemia  a1c 7.8  Hold oral meds  SSI with hypoglycemic protocol  Diabetic diet    Closed head injury  Assessment & Plan  Evidence per [facility]  Found on ground with abrasion on head with stove in front of him dented  Suspect occurred during syncopal event  CT with no acute abnormalities    Leukocytosis  Assessment & Plan  Likely 2/2  steroids  Continue doxycycline   Continue to monitor    Syncope  Assessment & Plan  With LOC  Hypoxia likely contributed  Echo- EF 55%      Acute respiratory failure with hypoxia (HCC)  Assessment & Plan  On room air today, will monitor  resolved       VTE prophylaxis: lovenox

## 2021-03-10 NOTE — CARE PLAN
Problem: Safety  Goal: Will remain free from falls  Outcome: PROGRESSING AS EXPECTED  Note: Remind the patient to use call light for assistance.     Problem: Psychosocial Needs:  Goal: Level of anxiety will decrease  Outcome: PROGRESSING AS EXPECTED  Note: Reorient the patient on even or situation.

## 2021-03-10 NOTE — PROGRESS NOTES
[facility] Medicine Daily Progress Note    Date of Service  3/10/2021    Chief Complaint  66 y.o. male admitted 3/5/2021 with past medical history of stroke with right-sided hemiplegia, diabetes, COPD who presents to the ER with a syncope.    Hospital Course  Patient was found to be hypoxic and revealing on 15 L nonrebreather.  He was given duo nebs, steroids and improved his oxygen to 4 L.  CT chest abdomen pelvis revealed no abnormalities.  CT scan T-spine found chronic compression fractures.  CT head found no abnormalities.  Chest x-ray found increased intravascular congestion.  Procalcitonin was 0.17.  Troponin were equivocal.  EKG found sinus tachycardia with poor progression.  Patient was started on steroids and inhalers for COPD exacerbation.    Interval Problem Update  No acute events overnight, on room air during my exam. Denies any shortness of breath. Glucose improved. HTN improved.  SLP eval results appreciated.    Consultants/Specialty  None    Code Status  DNAR/DNI    Disposition  Will need 24/7 supervision per SLP upon discharge.  This will require SNF placement as this cannot be achieved in the community with his current supports.      Review of Systems  Review of Systems   Constitutional: Negative.  Negative for chills, diaphoresis, fever, malaise/fatigue and weight loss.   HENT: Negative.  Negative for sore throat.    Eyes: Negative.  Negative for blurred vision.   Respiratory: Positive for cough. Negative for sputum production and shortness of breath.    Cardiovascular: Positive for chest pain (right sided chest wall). Negative for palpitations and leg swelling.   Gastrointestinal: Negative.  Negative for abdominal pain, nausea and vomiting.   Genitourinary: Negative.  Negative for dysuria.   Musculoskeletal: Negative.  Negative for myalgias.   Skin: Negative.  Negative for itching and rash.   Neurological: Positive for focal weakness (r sided, per pt stable x 10 years) and headaches. Negative for  dizziness and weakness.   Endo/Heme/Allergies: Negative.  Does not bruise/bleed easily.   Psychiatric/Behavioral: Positive for memory loss. Negative for depression, substance abuse and suicidal ideas.   All other systems reviewed and are negative.       Physical Exam  Temp:  [36.1 °C (97 °F)-36.8 °C (98.2 °F)] 36.1 °C (97 °F)  Pulse:  [76-87] 85  Resp:  [16-18] 18  BP: (154-185)/(60-80) 185/80  SpO2:  [91 %-96 %] 95 %    Physical Exam  Vitals and nursing note reviewed. Exam conducted with a chaperone present.   Constitutional:       General: He is not in acute distress.     Appearance: Normal appearance. He is not diaphoretic.      Comments: Unkempt  Speaking in full sentences with out difficulty   HENT:      Head:      Comments: Bruise to left forehead     Nose: Nose normal.      Mouth/Throat:      Mouth: Mucous membranes are moist.   Eyes:      Pupils: Pupils are equal, round, and reactive to light.   Cardiovascular:      Rate and Rhythm: Normal rate and regular rhythm.      Pulses: Normal pulses.      Heart sounds: Normal heart sounds.   Pulmonary:      Effort: Pulmonary effort is normal. No respiratory distress.      Breath sounds: No wheezing.      Comments: On room air   Chest:      Chest wall: Tenderness (to palpation, right chest, no s/o trauma) present.   Abdominal:      General: Abdomen is flat. Bowel sounds are normal.      Palpations: Abdomen is soft.   Musculoskeletal:         General: No swelling or deformity. Normal range of motion.   Skin:     General: Skin is warm and dry.      Capillary Refill: Capillary refill takes less than 2 seconds.      Findings: Bruising (right shin) present.   Neurological:      Mental Status: He is alert.      Cranial Nerves: No cranial nerve deficit.      Motor: Weakness (right sided hemiparesis, chronic per pt) present.      Comments: Oriented to person and place, not time or situation    Psychiatric:         Mood and Affect: Mood normal.         Behavior: Behavior  normal.         Current Facility-Administered Medications:   •  tiotropium (Spiriva Respimat) 2.5 mcg/Act inhalation spray 5 mcg, 5 mcg, Inhalation, QDAILY (RT), Yaya Garcia M.D., 5 mcg at 03/10/21 0812  •  ipratropium-albuterol (DUONEB) nebulizer solution, 3 mL, Nebulization, 4X/DAY (RT), Yaya Garcia M.D., 3 mL at 03/10/21 1134  •  hydrALAZINE (APRESOLINE) tablet 25 mg, 25 mg, Oral, Q8HRS, Wendi Isbell D.O., 25 mg at 03/10/21 0438  •  insulin regular (HumuLIN R,NovoLIN R) injection, 3-14 Units, Subcutaneous, 4X/DAY ACHS, 10 Units at 03/10/21 1224 **AND** POC Blood Glucose, , , Q AC AND BEDTIME(S) **AND** NOTIFY MD and PharmD, , , Once **AND** glucose 4 g chewable tablet 16 g, 16 g, Oral, Q15 MIN PRN **AND** dextrose 50% (D50W) injection 50 mL, 50 mL, Intravenous, Q15 MIN PRN, Elana Nguyen M.D.  •  ketorolac (TORADOL) injection 15 mg, 15 mg, Intravenous, Q6HRS PRN, Elana Nguyen M.D., 15 mg at 03/09/21 2101  •  omeprazole (PRILOSEC) capsule 40 mg, 40 mg, Oral, DAILY, Elana Nguyen M.D., 40 mg at 03/10/21 0438  •  lidocaine (LIDODERM) 5 % 1 Patch, 1 Patch, Transdermal, Q24HR, Elana Nguyen M.D., 1 Patch at 03/10/21 1229  •  oxyCODONE immediate-release (ROXICODONE) tablet 5 mg, 5 mg, Oral, Q4HRS PRN, Elana Nguyen M.D., 5 mg at 03/09/21 1429  •  labetalol (NORMODYNE/TRANDATE) injection 10-20 mg, 10-20 mg, Intravenous, Q6HRS PRN, Elana Nguyen M.D., 10 mg at 03/09/21 0412  •  aspirin EC (ECOTRIN) tablet 81 mg, 81 mg, Oral, DAILY, Sharmin Barkley M.D., 81 mg at 03/10/21 0438  •  Respiratory Therapy Consult, , Nebulization, Continuous RT, Sharmin Barkley M.D.  •  albuterol inhaler 2 Puff, 2 Puff, Inhalation, Q6HRS PRN, Sharmin Barkley M.D.  •  SITagliptin (JANUVIA) tablet 100 mg, 100 mg, Oral, DAILY, Sharmin Barkley M.D., 100 mg at 03/10/21 0438  •  amLODIPine (NORVASC) tablet 10 mg, 10 mg, Oral, DAILY, Sharmin Barkley M.D., 10 mg at 03/10/21 0438  •  atorvastatin (LIPITOR) tablet 40 mg, 40 mg, Oral, Nightly, Sharmin CAMARA  MICHAEL aBrkley, 40 mg at 03/09/21 2054  •  clopidogrel (PLAVIX) tablet 75 mg, 75 mg, Oral, DAILY, Sharmin Barkley M.D., 75 mg at 03/10/21 0438  •  gabapentin (NEURONTIN) capsule 600 mg, 600 mg, Oral, QHS, Sharmin Barkley M.D., 600 mg at 03/09/21 2054  •  valsartan (DIOVAN) tablet 320 mg, 320 mg, Oral, QHS, Sharmin Barkley M.D., 320 mg at 03/09/21 2055  •  senna-docusate (PERICOLACE or SENOKOT S) 8.6-50 MG per tablet 2 tablet, 2 tablet, Oral, BID, 2 tablet at 03/10/21 0438 **AND** polyethylene glycol/lytes (MIRALAX) PACKET 1 Packet, 1 Packet, Oral, QDAY PRN **AND** magnesium hydroxide (MILK OF MAGNESIA) suspension 30 mL, 30 mL, Oral, QDAY PRN **AND** bisacodyl (DULCOLAX) suppository 10 mg, 10 mg, Rectal, QDAY PRN, Sharmin Barkley M.D.  •  enoxaparin (LOVENOX) inj 40 mg, 40 mg, Subcutaneous, DAILY, Sharmin Barkley M.D., 40 mg at 03/10/21 0439  •  acetaminophen (Tylenol) tablet 650 mg, 650 mg, Oral, Q6HRS PRN, Sharmin Barkley M.D.  •  ondansetron (ZOFRAN) syringe/vial injection 4 mg, 4 mg, Intravenous, Q4HRS PRN, Sharmin Barkley M.D.  •  ondansetron (ZOFRAN ODT) dispertab 4 mg, 4 mg, Oral, Q4HRS PRN, Sharmin Barkley M.D.      Fluids    Intake/Output Summary (Last 24 hours) at 3/10/2021 1302  Last data filed at 3/10/2021 1212  Gross per 24 hour   Intake 720 ml   Output 1350 ml   Net -630 ml       Laboratory  Recent Labs     03/08/21  0042 03/09/21  0752 03/10/21  0039   WBC 15.9* 13.2* 10.9*   RBC 4.80 5.35 5.44   HEMOGLOBIN 14.5 16.2 16.1   HEMATOCRIT 43.7 48.6 51.5   MCV 91.0 90.8 94.7   MCH 30.2 30.3 29.6   MCHC 33.2* 33.3* 31.3*   RDW 46.0 46.4 48.1   PLATELETCT 292 304 290   MPV 9.1 9.1 9.5     Recent Labs     03/08/21  0042 03/09/21  0752 03/10/21  0039   SODIUM 135 138 132*   POTASSIUM 3.9 3.8 4.2   CHLORIDE 97 102 101   CO2 25 25 17*   GLUCOSE 227* 170* 136*   BUN 22 20 26*   CREATININE 0.89 0.86 0.94   CALCIUM 9.1 9.0 8.9                   Imaging  EC-ECHOCARDIOGRAM COMPLETE W/O CONT   Final Result      CT-CHEST,ABDOMEN,PELVIS  WITH   Final Result      1.  No acute abnormality within the chest, abdomen, or pelvis      2.  Hepatic steatosis      3.  Aortic and branch vessel atherosclerotic plaque      4.  Incidental bilateral renal cyst      No follow up imaging is recommended per consensus guidelines of the 2019 ACR Incidental Findings Committee for probably benign incidental simple appearing renal cystic lesion(s) based on imaging criteria.         CT-TSPINE W/O PLUS RECONS   Final Result      Mild wedge deformities of T12 and T2 have a chronic appearance.      Multilevel degenerative changes as above described.         CT-LSPINE W/O PLUS RECONS   Final Result      Mild wedge deformity of T12 has a chronic appearance.      Degenerative changes as above described, including facet arthropathy.      Dorsal displacement of the coccyx which is of indeterminate age and may be chronic.      Atherosclerotic plaque.      CT-HEAD W/O   Final Result      No acute intracranial abnormality is identified.      There are periventricular and subcortical white matter changes present.  This finding is nonspecific and could be from previous small vessel ischemia, demyelination, or gliosis.      Hypodensity in the jaxon likely related to a prior lacunar infarct.      Encephalomalacia in the left occipital lobe is likely related to a prior infarct.      Left frontal scalp hematoma.      CT-CSPINE WITHOUT PLUS RECONS   Final Result      Multilevel degenerative changes as above described.      Minimal grade 1 anterolisthesis of C5 on C6.      Minimal loss of height of T2 has a chronic appearance.         DX-CHEST-LIMITED (1 VIEW)   Final Result      Mild pulmonary vascular congestion.      Atherosclerotic plaque.      Elevation of the right hemidiaphragm.              Assessment/Plan  * Acute exacerbation of chronic obstructive pulmonary disease (COPD) ([facility])  Assessment & Plan  Acute on chronic exacerbation  With suspected pneumonitis - doxy started  Not on o2  at baseline  Continue steroids and respiratory protocol    Discharge planning issues  Assessment & Plan  Patient oriented x2   OT- Not safe to DC home as patient is unable to complete ADLs  SLP cognitive eval notes patient needs 24/7 supervision.     CM to help find NOK     Essential hypertension  Assessment & Plan  Uncontrolled  Continue current home medications  IV as needed medications have been ordered  Increased hydralazine     Weakness  Assessment & Plan  Acute on chronic  Wheelchair bound at baseline  Chronic r hemiparesis    Encephalopathy  Assessment & Plan  Baseline unknown  Unclear if he had a concussion  CT negative  [facility] records [facility]  Neuro checks  Seizure precautions  Following    SLP cognitive eval pending     DM (diabetes mellitus) (HCC)  Assessment & Plan  With hyperglycemia  a1c 7.8  Hold oral meds  SSI with hypoglycemic protocol  Diabetic diet    Closed head injury  Assessment & Plan  Evidence per [facility]  Found on ground with abrasion on head with stove in front of him dented  Suspect occurred during syncopal event  CT with no acute abnormalities    Leukocytosis  Assessment & Plan  Likely 2/2 steroids  Continue doxycycline   Continue to monitor    Syncope  Assessment & Plan  With LOC  Hypoxia likely contributed  Echo- EF 55%      Acute respiratory failure with hypoxia (HCC)  Assessment & Plan  On room air today, will monitor  resolved       VTE prophylaxis: lovenox

## 2021-03-10 NOTE — PROGRESS NOTES
Bedside report received. POC discussed with pt; all questions answered at this time. Call light within reach, fall precautions in place. Patient c/o pain and was medicated as ordered with PRN pain medication. No s/s of apparent distress noted at this time

## 2021-03-10 NOTE — PALLIATIVE CARE
Palliative Care follow-up  Spoke with Dr Garcia, pt does not have capacity today in order to have AD notarized. Will continue to check on pt for capacity.     Thank you for allowing Palliative Care to support this patient and family. Contact [phone] for additional assistance, change in patient status, or with any questions/concerns.

## 2021-03-10 NOTE — CARE PLAN
Problem: Safety  Goal: Will remain free from injury  Outcome: PROGRESSING SLOWER THAN EXPECTED  Goal: Will remain free from falls  Outcome: PROGRESSING SLOWER THAN EXPECTED     Problem: Infection  Goal: Will remain free from infection  Outcome: PROGRESSING SLOWER THAN EXPECTED

## 2021-03-11 LAB
ALBUMIN SERPL BCP-MCNC: 3.6 G/DL (ref 3.2–4.9)
ALBUMIN/GLOB SERPL: 1.3 G/DL
ALP SERPL-CCNC: 79 U/L (ref 30–99)
ALT SERPL-CCNC: 17 U/L (ref 2–50)
ANION GAP SERPL CALC-SCNC: 14 MMOL/L (ref 7–16)
AST SERPL-CCNC: 14 U/L (ref 12–45)
BASOPHILS # BLD AUTO: 0.3 % (ref 0–1.8)
BASOPHILS # BLD: 0.04 K/UL (ref 0–0.12)
BILIRUB SERPL-MCNC: 0.4 MG/DL (ref 0.1–1.5)
BUN SERPL-MCNC: 26 MG/DL (ref 8–22)
CALCIUM SERPL-MCNC: 9.1 MG/DL (ref 8.5–10.5)
CHLORIDE SERPL-SCNC: 104 MMOL/L (ref 96–112)
CO2 SERPL-SCNC: 23 MMOL/L (ref 20–33)
CREAT SERPL-MCNC: 0.96 MG/DL (ref 0.5–1.4)
EOSINOPHIL # BLD AUTO: 0.12 K/UL (ref 0–0.51)
EOSINOPHIL NFR BLD: 1 % (ref 0–6.9)
ERYTHROCYTE [DISTWIDTH] IN BLOOD BY AUTOMATED COUNT: 44.2 FL (ref 35.9–50)
GLOBULIN SER CALC-MCNC: 2.7 G/DL (ref 1.9–3.5)
GLUCOSE BLD-MCNC: 121 MG/DL (ref 65–99)
GLUCOSE BLD-MCNC: 177 MG/DL (ref 65–99)
GLUCOSE BLD-MCNC: 202 MG/DL (ref 65–99)
GLUCOSE SERPL-MCNC: 125 MG/DL (ref 65–99)
HCT VFR BLD AUTO: 49.4 % (ref 42–52)
HGB BLD-MCNC: 16 G/DL (ref 14–18)
IMM GRANULOCYTES # BLD AUTO: 0.11 K/UL (ref 0–0.11)
IMM GRANULOCYTES NFR BLD AUTO: 0.9 % (ref 0–0.9)
LYMPHOCYTES # BLD AUTO: 3.13 K/UL (ref 1–4.8)
LYMPHOCYTES NFR BLD: 26.3 % (ref 22–41)
MCH RBC QN AUTO: 28.9 PG (ref 27–33)
MCHC RBC AUTO-ENTMCNC: 32.4 G/DL (ref 33.7–35.3)
MCV RBC AUTO: 89.3 FL (ref 81.4–97.8)
MONOCYTES # BLD AUTO: 0.94 K/UL (ref 0–0.85)
MONOCYTES NFR BLD AUTO: 7.9 % (ref 0–13.4)
NEUTROPHILS # BLD AUTO: 7.54 K/UL (ref 1.82–7.42)
NEUTROPHILS NFR BLD: 63.6 % (ref 44–72)
NRBC # BLD AUTO: 0 K/UL
NRBC BLD-RTO: 0 /100 WBC
PLATELET # BLD AUTO: 275 K/UL (ref 164–446)
PMV BLD AUTO: 9.4 FL (ref 9–12.9)
POTASSIUM SERPL-SCNC: 3.6 MMOL/L (ref 3.6–5.5)
PROT SERPL-MCNC: 6.3 G/DL (ref 6–8.2)
RBC # BLD AUTO: 5.53 M/UL (ref 4.7–6.1)
SODIUM SERPL-SCNC: 141 MMOL/L (ref 135–145)
WBC # BLD AUTO: 11.9 K/UL (ref 4.8–10.8)

## 2021-03-11 PROCEDURE — 94669 MECHANICAL CHEST WALL OSCILL: CPT

## 2021-03-11 PROCEDURE — 82962 GLUCOSE BLOOD TEST: CPT | Mod: 91

## 2021-03-11 PROCEDURE — A9270 NON-COVERED ITEM OR SERVICE: HCPCS | Performed by: HOSPITALIST

## 2021-03-11 PROCEDURE — 700111 HCHG RX REV CODE 636 W/ 250 OVERRIDE (IP): Performed by: HOSPITALIST

## 2021-03-11 PROCEDURE — 700102 HCHG RX REV CODE 250 W/ 637 OVERRIDE(OP): Performed by: HOSPITALIST

## 2021-03-11 PROCEDURE — A9270 NON-COVERED ITEM OR SERVICE: HCPCS | Performed by: INTERNAL MEDICINE

## 2021-03-11 PROCEDURE — 94760 N-INVAS EAR/PLS OXIMETRY 1: CPT

## 2021-03-11 PROCEDURE — 80053 COMPREHEN METABOLIC PANEL: CPT

## 2021-03-11 PROCEDURE — 36415 COLL VENOUS BLD VENIPUNCTURE: CPT

## 2021-03-11 PROCEDURE — 85025 COMPLETE CBC W/AUTO DIFF WBC: CPT

## 2021-03-11 PROCEDURE — 97530 THERAPEUTIC ACTIVITIES: CPT

## 2021-03-11 PROCEDURE — 700102 HCHG RX REV CODE 250 W/ 637 OVERRIDE(OP): Performed by: INTERNAL MEDICINE

## 2021-03-11 PROCEDURE — 770006 HCHG ROOM/CARE - MED/SURG/GYN SEMI*

## 2021-03-11 PROCEDURE — 700101 HCHG RX REV CODE 250: Performed by: HOSPITALIST

## 2021-03-11 PROCEDURE — 94640 AIRWAY INHALATION TREATMENT: CPT

## 2021-03-11 PROCEDURE — 99231 SBSQ HOSP IP/OBS SF/LOW 25: CPT | Performed by: HOSPITALIST

## 2021-03-11 RX ADMIN — IPRATROPIUM BROMIDE AND ALBUTEROL SULFATE 3 ML: .5; 2.5 SOLUTION RESPIRATORY (INHALATION) at 07:13

## 2021-03-11 RX ADMIN — LIDOCAINE 1 PATCH: 50 PATCH TOPICAL at 11:08

## 2021-03-11 RX ADMIN — INSULIN HUMAN 4 UNITS: 100 INJECTION, SOLUTION PARENTERAL at 17:19

## 2021-03-11 RX ADMIN — AMLODIPINE BESYLATE 10 MG: 10 TABLET ORAL at 05:15

## 2021-03-11 RX ADMIN — SITAGLIPTIN 100 MG: 100 TABLET, FILM COATED ORAL at 05:15

## 2021-03-11 RX ADMIN — IPRATROPIUM BROMIDE AND ALBUTEROL SULFATE 3 ML: .5; 2.5 SOLUTION RESPIRATORY (INHALATION) at 14:17

## 2021-03-11 RX ADMIN — ATORVASTATIN CALCIUM 40 MG: 40 TABLET, FILM COATED ORAL at 21:44

## 2021-03-11 RX ADMIN — HYDRALAZINE HYDROCHLORIDE 25 MG: 25 TABLET, FILM COATED ORAL at 05:15

## 2021-03-11 RX ADMIN — INSULIN HUMAN 3 UNITS: 100 INJECTION, SOLUTION PARENTERAL at 21:48

## 2021-03-11 RX ADMIN — ASPIRIN 81 MG: 81 TABLET, COATED ORAL at 05:15

## 2021-03-11 RX ADMIN — OMEPRAZOLE 40 MG: 20 CAPSULE, DELAYED RELEASE ORAL at 05:15

## 2021-03-11 RX ADMIN — GABAPENTIN 600 MG: 300 CAPSULE ORAL at 21:44

## 2021-03-11 RX ADMIN — DOCUSATE SODIUM 50 MG AND SENNOSIDES 8.6 MG 2 TABLET: 8.6; 5 TABLET, FILM COATED ORAL at 17:16

## 2021-03-11 RX ADMIN — IPRATROPIUM BROMIDE AND ALBUTEROL SULFATE 3 ML: .5; 2.5 SOLUTION RESPIRATORY (INHALATION) at 10:17

## 2021-03-11 RX ADMIN — CLOPIDOGREL BISULFATE 75 MG: 75 TABLET ORAL at 05:15

## 2021-03-11 RX ADMIN — KETOROLAC TROMETHAMINE 15 MG: 30 INJECTION, SOLUTION INTRAMUSCULAR; INTRAVENOUS at 12:23

## 2021-03-11 RX ADMIN — OXYCODONE 5 MG: 5 TABLET ORAL at 21:55

## 2021-03-11 RX ADMIN — HYDRALAZINE HYDROCHLORIDE 25 MG: 25 TABLET, FILM COATED ORAL at 15:03

## 2021-03-11 RX ADMIN — ENOXAPARIN SODIUM 40 MG: 40 INJECTION SUBCUTANEOUS at 05:14

## 2021-03-11 RX ADMIN — IPRATROPIUM BROMIDE AND ALBUTEROL SULFATE 3 ML: .5; 2.5 SOLUTION RESPIRATORY (INHALATION) at 21:12

## 2021-03-11 RX ADMIN — VALSARTAN 320 MG: 80 TABLET, FILM COATED ORAL at 21:45

## 2021-03-11 RX ADMIN — HYDRALAZINE HYDROCHLORIDE 25 MG: 25 TABLET, FILM COATED ORAL at 21:44

## 2021-03-11 RX ADMIN — INSULIN HUMAN 3 UNITS: 100 INJECTION, SOLUTION PARENTERAL at 11:12

## 2021-03-11 ASSESSMENT — PAIN DESCRIPTION - PAIN TYPE
TYPE: ACUTE PAIN

## 2021-03-11 ASSESSMENT — ENCOUNTER SYMPTOMS
CONSTITUTIONAL NEGATIVE: 1
BRUISES/BLEEDS EASILY: 0
WEIGHT LOSS: 0
HEADACHES: 1
EYES NEGATIVE: 1
DEPRESSION: 0
MUSCULOSKELETAL NEGATIVE: 1
ABDOMINAL PAIN: 0
PALPITATIONS: 0
NAUSEA: 0
WEAKNESS: 0
SORE THROAT: 0
MEMORY LOSS: 1
VOMITING: 0
FEVER: 0
SHORTNESS OF BREATH: 0
COUGH: 1
DIAPHORESIS: 0
MYALGIAS: 0
GASTROINTESTINAL NEGATIVE: 1
FOCAL WEAKNESS: 1
BLURRED VISION: 0
DIZZINESS: 0
SPUTUM PRODUCTION: 0
CHILLS: 0

## 2021-03-11 ASSESSMENT — COGNITIVE AND FUNCTIONAL STATUS - GENERAL
DAILY ACTIVITIY SCORE: 20
SUGGESTED CMS G CODE MODIFIER DAILY ACTIVITY: CJ
TOILETING: A LITTLE
DRESSING REGULAR LOWER BODY CLOTHING: A LITTLE
HELP NEEDED FOR BATHING: A LITTLE
DRESSING REGULAR UPPER BODY CLOTHING: A LITTLE

## 2021-03-11 ASSESSMENT — LIFESTYLE VARIABLES: SUBSTANCE_ABUSE: 0

## 2021-03-11 NOTE — PROGRESS NOTES
Assumed care at 0700 after report received from night RN. Pt A/Ox3 (unaware of event). PIV site WNL, pain 4/10 and bettered to 3/10 with repositioning. Pt has severe right sided deficit due to previous CVA, is WCB at baseline. Pt on RA, awaiting placement. Will need 24/7 supervision at discharge, could be difficult discharge

## 2021-03-11 NOTE — PROGRESS NOTES
Assumed care at 1900. Received bedside report from STEFFEN Gillespie. Patient was awake and sitting in bed. Patient stated that he was having pain in his right leg. Patient advised on comfort measures. Medication given per MAR. No signs of distress. Bed is in low and locked position. Non-slip socks in place. Call light is within reach. Bed alarm is on. Hourly rounding in place.

## 2021-03-11 NOTE — CARE PLAN
Problem: Safety  Goal: Will remain free from falls  Outcome: PROGRESSING AS EXPECTED  Intervention: Assess risk factors for falls  Flowsheets (Taken 3/11/2021 5661)  Fall Risk: High Risk to Fall - 2 or more points   History of fall: 2  Mobility Status Assessment: 2-2 Healthcare Providers Required for Assistance with Ambulation & Transfer  Note: All fall precautions in place at this time     Problem: Psychosocial Needs:  Goal: Level of anxiety will decrease  Outcome: PROGRESSING AS EXPECTED  Intervention: Identify and develop with patient strategies to cope with anxiety triggers  Note: Pt calm this morning. Encouraged patient to voice feelings as needed.

## 2021-03-11 NOTE — THERAPY
Occupational Therapy  Daily Treatment     Patient Name: Clint Duarte  Age:  66 y.o., Sex:  male  Medical Record #: [id number]  Today's Date: 3/11/2021     Precautions  Precautions: (P) Fall Risk  Comments: encephalopathy and closed head injury (found on ground)    Assessment     Pt currently limited by decreased functional mobility, activity tolerance, balance, and pain which are affecting pt's ability to complete ADLs/IADLs at baseline. Pt would benefit from OT services in the acute care setting to maximize functional recovery.     Plan    Continue current treatment plan.    DC Equipment Recommendations: Unable to determine at this time  Discharge Recommendations: (P) Recommend post-acute placement for additional occupational therapy services prior to discharge home(at this time needs 24/7 supersion and physical A)       03/11/21 0748   Activities of Daily Living   Grooming Supervision   Upper Body Dressing Supervision   Lower Body Dressing Minimal Assist   Functional Mobility   Sit to Stand Minimal Assist   Bed, Chair, Wheelchair Transfer Moderate Assist   Short Term Goals   Short Term Goal # 1 Pt will perform toileting task with supervision   Goal Outcome # 1 Progressing as expected   Short Term Goal # 2 Pt will perform functional t/f's with supervision   Goal Outcome # 2 Progressing slower than expected

## 2021-03-11 NOTE — PROGRESS NOTES
[facility] Medicine Daily Progress Note    Date of Service  3/11/2021    Chief Complaint  66 y.o. male admitted 3/5/2021 with past medical history of stroke with right-sided hemiplegia, diabetes, COPD who presents to the ER with a syncope.    Hospital Course  Patient was found to be hypoxic and revealing on 15 L nonrebreather.  He was given duo nebs, steroids and improved his oxygen to 4 L.  CT chest abdomen pelvis revealed no abnormalities.  CT scan T-spine found chronic compression fractures.  CT head found no abnormalities.  Chest x-ray found increased intravascular congestion.  Procalcitonin was 0.17.  Troponin were equivocal.  EKG found sinus tachycardia with poor progression.  Patient was started on steroids and inhalers for COPD exacerbation.    Interval Problem Update  No acute events overnight, on room air during my exam. Denies any shortness of breath. Glucose improved. HTN improved.  SLP eval results noted and appreciated.    Consultants/Specialty  None    Code Status  DNAR/DNI    Disposition  Will need 24/7 supervision per SLP upon discharge.  This will require SNF placement as this cannot be achieved in the community with his current supports.      Review of Systems  Review of Systems   Constitutional: Negative.  Negative for chills, diaphoresis, fever, malaise/fatigue and weight loss.   HENT: Negative.  Negative for sore throat.    Eyes: Negative.  Negative for blurred vision.   Respiratory: Positive for cough. Negative for sputum production and shortness of breath.    Cardiovascular: Positive for chest pain (right sided chest wall). Negative for palpitations and leg swelling.   Gastrointestinal: Negative.  Negative for abdominal pain, nausea and vomiting.   Genitourinary: Negative.  Negative for dysuria.   Musculoskeletal: Negative.  Negative for myalgias.   Skin: Negative.  Negative for itching and rash.   Neurological: Positive for focal weakness (r sided, per pt stable x 10 years) and headaches.  Negative for dizziness and weakness.   Endo/Heme/Allergies: Negative.  Does not bruise/bleed easily.   Psychiatric/Behavioral: Positive for memory loss. Negative for depression, substance abuse and suicidal ideas.   All other systems reviewed and are negative.       Physical Exam  Temp:  [36.2 °C (97.1 °F)-36.6 °C (97.9 °F)] 36.4 °C (97.6 °F)  Pulse:  [69-84] 82  Resp:  [14-18] 17  BP: (134-162)/(61-81) 145/70  SpO2:  [89 %-97 %] 89 %    Physical Exam  Vitals and nursing note reviewed. Exam conducted with a chaperone present.   Constitutional:       General: He is not in acute distress.     Appearance: Normal appearance. He is not diaphoretic.      Comments: Unkempt  Speaking in full sentences with out difficulty   HENT:      Head:      Comments: Bruise to left forehead     Nose: Nose normal.      Mouth/Throat:      Mouth: Mucous membranes are moist.   Eyes:      Pupils: Pupils are equal, round, and reactive to light.   Cardiovascular:      Rate and Rhythm: Normal rate and regular rhythm.      Pulses: Normal pulses.      Heart sounds: Normal heart sounds.   Pulmonary:      Effort: Pulmonary effort is normal. No respiratory distress.      Breath sounds: No wheezing.      Comments: On room air   Chest:      Chest wall: Tenderness (to palpation, right chest, no s/o trauma) present.   Abdominal:      General: Abdomen is flat. Bowel sounds are normal.      Palpations: Abdomen is soft.   Musculoskeletal:         General: No swelling or deformity. Normal range of motion.   Skin:     General: Skin is warm and dry.      Capillary Refill: Capillary refill takes less than 2 seconds.      Findings: Bruising (right shin) present.   Neurological:      Mental Status: He is alert.      Cranial Nerves: No cranial nerve deficit.      Motor: Weakness (right sided hemiparesis, chronic per pt) present.      Comments: Oriented to person and place, not time or situation    Psychiatric:         Mood and Affect: Mood normal.          Behavior: Behavior normal.         Current Facility-Administered Medications:   •  tiotropium (Spiriva Respimat) 2.5 mcg/Act inhalation spray 5 mcg, 5 mcg, Inhalation, QDAILY (RT), Yaya Garcia M.D., Stopped at 03/11/21 0800  •  ipratropium-albuterol (DUONEB) nebulizer solution, 3 mL, Nebulization, 4X/DAY (RT), Yaya Garcia M.D., 3 mL at 03/11/21 1017  •  hydrALAZINE (APRESOLINE) tablet 25 mg, 25 mg, Oral, Q8HRS, Wendi Isbell D.JOCELYN, 25 mg at 03/11/21 0515  •  insulin regular (HumuLIN R,NovoLIN R) injection, 3-14 Units, Subcutaneous, 4X/DAY ACHS, 3 Units at 03/11/21 1112 **AND** POC Blood Glucose, , , Q AC AND BEDTIME(S) **AND** NOTIFY MD and PharmD, , , Once **AND** glucose 4 g chewable tablet 16 g, 16 g, Oral, Q15 MIN PRN **AND** dextrose 50% (D50W) injection 50 mL, 50 mL, Intravenous, Q15 MIN PRN, Elana Nguyen M.D.  •  ketorolac (TORADOL) injection 15 mg, 15 mg, Intravenous, Q6HRS PRN, Elana Nguyen M.D., 15 mg at 03/11/21 1223  •  omeprazole (PRILOSEC) capsule 40 mg, 40 mg, Oral, DAILY, Elana Nguyen M.D., 40 mg at 03/11/21 0515  •  lidocaine (LIDODERM) 5 % 1 Patch, 1 Patch, Transdermal, Q24HR, Elana Nguyen M.D., 1 Patch at 03/11/21 1108  •  oxyCODONE immediate-release (ROXICODONE) tablet 5 mg, 5 mg, Oral, Q4HRS PRN, Elana Nguyen M.D., Stopped at 03/11/21 0044  •  labetalol (NORMODYNE/TRANDATE) injection 10-20 mg, 10-20 mg, Intravenous, Q6HRS PRN, Elana Nguyen M.D., 10 mg at 03/09/21 0412  •  aspirin EC (ECOTRIN) tablet 81 mg, 81 mg, Oral, DAILY, Sharmin Barkley M.D., 81 mg at 03/11/21 0515  •  Respiratory Therapy Consult, , Nebulization, Continuous RT, Sharmin Barkley M.D.  •  albuterol inhaler 2 Puff, 2 Puff, Inhalation, Q6HRS PRN, Sharmin Barkley M.D.  •  SITagliptin (JANUVIA) tablet 100 mg, 100 mg, Oral, DAILY, Sharmin Barkley M.D., 100 mg at 03/11/21 0515  •  amLODIPine (NORVASC) tablet 10 mg, 10 mg, Oral, DAILY, Sharmin Barkley M.D., 10 mg at 03/11/21 0515  •  atorvastatin (LIPITOR) tablet 40 mg, 40 mg,  Oral, Nightly, Sharmin Barkley M.D., 40 mg at 03/10/21 2114  •  clopidogrel (PLAVIX) tablet 75 mg, 75 mg, Oral, DAILY, Sharmin Barkley M.D., 75 mg at 03/11/21 0515  •  gabapentin (NEURONTIN) capsule 600 mg, 600 mg, Oral, QHS, Sharmin Barkley M.D., 600 mg at 03/10/21 2114  •  valsartan (DIOVAN) tablet 320 mg, 320 mg, Oral, QHS, Sharmin Barkley M.D., 320 mg at 03/10/21 2114  •  senna-docusate (PERICOLACE or SENOKOT S) 8.6-50 MG per tablet 2 tablet, 2 tablet, Oral, BID, 2 tablet at 03/10/21 1725 **AND** polyethylene glycol/lytes (MIRALAX) PACKET 1 Packet, 1 Packet, Oral, QDAY PRN **AND** magnesium hydroxide (MILK OF MAGNESIA) suspension 30 mL, 30 mL, Oral, QDAY PRN **AND** bisacodyl (DULCOLAX) suppository 10 mg, 10 mg, Rectal, QDAY PRN, Sharmin Barkley M.D.  •  enoxaparin (LOVENOX) inj 40 mg, 40 mg, Subcutaneous, DAILY, Sharmin Barkley M.D., 40 mg at 03/11/21 0514  •  acetaminophen (Tylenol) tablet 650 mg, 650 mg, Oral, Q6HRS PRN, Sharmin Barkley M.D.  •  ondansetron (ZOFRAN) syringe/vial injection 4 mg, 4 mg, Intravenous, Q4HRS PRN, Sharmin Barkley M.D.  •  ondansetron (ZOFRAN ODT) dispertab 4 mg, 4 mg, Oral, Q4HRS PRN, Sharmin Barkley M.D.      Fluids    Intake/Output Summary (Last 24 hours) at 3/11/2021 1253  Last data filed at 3/11/2021 0900  Gross per 24 hour   Intake 600 ml   Output 750 ml   Net -150 ml       Laboratory  Recent Labs     03/09/21  0752 03/10/21  0039 03/11/21  0034   WBC 13.2* 10.9* 11.9*   RBC 5.35 5.44 5.53   HEMOGLOBIN 16.2 16.1 16.0   HEMATOCRIT 48.6 51.5 49.4   MCV 90.8 94.7 89.3   MCH 30.3 29.6 28.9   MCHC 33.3* 31.3* 32.4*   RDW 46.4 48.1 44.2   PLATELETCT 304 290 275   MPV 9.1 9.5 9.4     Recent Labs     03/09/21  0752 03/10/21  0039 03/11/21  0034   SODIUM 138 132* 141   POTASSIUM 3.8 4.2 3.6   CHLORIDE 102 101 104   CO2 25 17* 23   GLUCOSE 170* 136* 125*   BUN 20 26* 26*   CREATININE 0.86 0.94 0.96   CALCIUM 9.0 8.9 9.1                   Imaging  EC-ECHOCARDIOGRAM COMPLETE W/O CONT   Final Result       CT-CHEST,ABDOMEN,PELVIS WITH   Final Result      1.  No acute abnormality within the chest, abdomen, or pelvis      2.  Hepatic steatosis      3.  Aortic and branch vessel atherosclerotic plaque      4.  Incidental bilateral renal cyst      No follow up imaging is recommended per consensus guidelines of the 2019 ACR Incidental Findings Committee for probably benign incidental simple appearing renal cystic lesion(s) based on imaging criteria.         CT-TSPINE W/O PLUS RECONS   Final Result      Mild wedge deformities of T12 and T2 have a chronic appearance.      Multilevel degenerative changes as above described.         CT-LSPINE W/O PLUS RECONS   Final Result      Mild wedge deformity of T12 has a chronic appearance.      Degenerative changes as above described, including facet arthropathy.      Dorsal displacement of the coccyx which is of indeterminate age and may be chronic.      Atherosclerotic plaque.      CT-HEAD W/O   Final Result      No acute intracranial abnormality is identified.      There are periventricular and subcortical white matter changes present.  This finding is nonspecific and could be from previous small vessel ischemia, demyelination, or gliosis.      Hypodensity in the jaxon likely related to a prior lacunar infarct.      Encephalomalacia in the left occipital lobe is likely related to a prior infarct.      Left frontal scalp hematoma.      CT-CSPINE WITHOUT PLUS RECONS   Final Result      Multilevel degenerative changes as above described.      Minimal grade 1 anterolisthesis of C5 on C6.      Minimal loss of height of T2 has a chronic appearance.         DX-CHEST-LIMITED (1 VIEW)   Final Result      Mild pulmonary vascular congestion.      Atherosclerotic plaque.      Elevation of the right hemidiaphragm.              Assessment/Plan  * Acute exacerbation of chronic obstructive pulmonary disease (COPD) ([facility])  Assessment & Plan  Acute on chronic exacerbation  With suspected pneumonitis -  doxy started  Not on o2 at baseline  Continue steroids and respiratory protocol    Discharge planning issues  Assessment & Plan  Patient oriented x2   OT- Not safe to DC home as patient is unable to complete ADLs  SLP cognitive eval notes patient needs 24/7 supervision.     CM to help find NOK     Essential hypertension  Assessment & Plan  Uncontrolled  Continue current home medications  IV as needed medications have been ordered  Increased hydralazine     Weakness  Assessment & Plan  Acute on chronic  Wheelchair bound at baseline  Chronic r hemiparesis    Encephalopathy  Assessment & Plan  Baseline unknown  Unclear if he had a concussion  CT negative  [facility]  Neuro checks  Seizure precautions  Following    SLP cognitive eval pending     DM (diabetes mellitus) (HCC)  Assessment & Plan  With hyperglycemia  a1c 7.8  Hold oral meds  SSI with hypoglycemic protocol  Diabetic diet    Closed head injury  Assessment & Plan  Evidence per [facility]  Found on ground with abrasion on head with stove in front of him dented  Suspect occurred during syncopal event  CT with no acute abnormalities    Leukocytosis  Assessment & Plan  Likely 2/2 steroids  Continue doxycycline   Continue to monitor    Syncope  Assessment & Plan  With LOC  Hypoxia likely contributed  Echo- EF 55%      Acute respiratory failure with hypoxia (HCC)  Assessment & Plan  On room air today, will monitor  resolved       VTE prophylaxis: lovenox

## 2021-03-11 NOTE — DISCHARGE PLANNING
Anticipated Discharge Disposition: TBD    Action: LSW spoke with [facility] who explained that Pt is not service connected so will not be eligible for [facility]    Barriers to Discharge: Pt needing 24/7 supervision before returning home    Plan: MARY ALICEW to confirm with [facility] that Pt is not service connected

## 2021-03-11 NOTE — DISCHARGE PLANNING
Anticipated Discharge Disposition: SNF    Action: LSW tried to call [facility] eligibility to confirm Pt's service connected status, no answer. Will try again later today.     Barriers to Discharge: Pt needs 24/7 supervision while doing cognitive rehab    Plan: LSW to attempt reaching [facility] eligibility again about Pt's service connection.

## 2021-03-12 LAB
ALBUMIN SERPL BCP-MCNC: 3.2 G/DL (ref 3.2–4.9)
ALBUMIN/GLOB SERPL: 1.1 G/DL
ALP SERPL-CCNC: 84 U/L (ref 30–99)
ALT SERPL-CCNC: 21 U/L (ref 2–50)
ANION GAP SERPL CALC-SCNC: 11 MMOL/L (ref 7–16)
AST SERPL-CCNC: 17 U/L (ref 12–45)
BASOPHILS # BLD AUTO: 0.3 % (ref 0–1.8)
BASOPHILS # BLD: 0.04 K/UL (ref 0–0.12)
BILIRUB SERPL-MCNC: 0.3 MG/DL (ref 0.1–1.5)
BUN SERPL-MCNC: 22 MG/DL (ref 8–22)
CALCIUM SERPL-MCNC: 9 MG/DL (ref 8.5–10.5)
CHLORIDE SERPL-SCNC: 106 MMOL/L (ref 96–112)
CO2 SERPL-SCNC: 26 MMOL/L (ref 20–33)
CREAT SERPL-MCNC: 0.89 MG/DL (ref 0.5–1.4)
EOSINOPHIL # BLD AUTO: 0.3 K/UL (ref 0–0.51)
EOSINOPHIL NFR BLD: 2.4 % (ref 0–6.9)
ERYTHROCYTE [DISTWIDTH] IN BLOOD BY AUTOMATED COUNT: 45 FL (ref 35.9–50)
GLOBULIN SER CALC-MCNC: 3 G/DL (ref 1.9–3.5)
GLUCOSE BLD-MCNC: 132 MG/DL (ref 65–99)
GLUCOSE BLD-MCNC: 148 MG/DL (ref 65–99)
GLUCOSE BLD-MCNC: 152 MG/DL (ref 65–99)
GLUCOSE BLD-MCNC: 164 MG/DL (ref 65–99)
GLUCOSE BLD-MCNC: 180 MG/DL (ref 65–99)
GLUCOSE SERPL-MCNC: 124 MG/DL (ref 65–99)
HCT VFR BLD AUTO: 49.1 % (ref 42–52)
HGB BLD-MCNC: 16.2 G/DL (ref 14–18)
IMM GRANULOCYTES # BLD AUTO: 0.11 K/UL (ref 0–0.11)
IMM GRANULOCYTES NFR BLD AUTO: 0.9 % (ref 0–0.9)
LYMPHOCYTES # BLD AUTO: 3.6 K/UL (ref 1–4.8)
LYMPHOCYTES NFR BLD: 28.9 % (ref 22–41)
MCH RBC QN AUTO: 29.3 PG (ref 27–33)
MCHC RBC AUTO-ENTMCNC: 33 G/DL (ref 33.7–35.3)
MCV RBC AUTO: 88.9 FL (ref 81.4–97.8)
MONOCYTES # BLD AUTO: 0.81 K/UL (ref 0–0.85)
MONOCYTES NFR BLD AUTO: 6.5 % (ref 0–13.4)
NEUTROPHILS # BLD AUTO: 7.59 K/UL (ref 1.82–7.42)
NEUTROPHILS NFR BLD: 61 % (ref 44–72)
NRBC # BLD AUTO: 0 K/UL
NRBC BLD-RTO: 0 /100 WBC
PLATELET # BLD AUTO: 329 K/UL (ref 164–446)
PMV BLD AUTO: 9 FL (ref 9–12.9)
POTASSIUM SERPL-SCNC: 3.6 MMOL/L (ref 3.6–5.5)
PROT SERPL-MCNC: 6.2 G/DL (ref 6–8.2)
RBC # BLD AUTO: 5.52 M/UL (ref 4.7–6.1)
SODIUM SERPL-SCNC: 143 MMOL/L (ref 135–145)
WBC # BLD AUTO: 12.5 K/UL (ref 4.8–10.8)

## 2021-03-12 PROCEDURE — A9270 NON-COVERED ITEM OR SERVICE: HCPCS | Performed by: HOSPITALIST

## 2021-03-12 PROCEDURE — 99231 SBSQ HOSP IP/OBS SF/LOW 25: CPT | Performed by: HOSPITALIST

## 2021-03-12 PROCEDURE — 700102 HCHG RX REV CODE 250 W/ 637 OVERRIDE(OP): Performed by: HOSPITALIST

## 2021-03-12 PROCEDURE — 80053 COMPREHEN METABOLIC PANEL: CPT

## 2021-03-12 PROCEDURE — 700101 HCHG RX REV CODE 250: Performed by: HOSPITALIST

## 2021-03-12 PROCEDURE — 770006 HCHG ROOM/CARE - MED/SURG/GYN SEMI*

## 2021-03-12 PROCEDURE — A9270 NON-COVERED ITEM OR SERVICE: HCPCS | Performed by: INTERNAL MEDICINE

## 2021-03-12 PROCEDURE — 94669 MECHANICAL CHEST WALL OSCILL: CPT

## 2021-03-12 PROCEDURE — 94760 N-INVAS EAR/PLS OXIMETRY 1: CPT

## 2021-03-12 PROCEDURE — 700102 HCHG RX REV CODE 250 W/ 637 OVERRIDE(OP): Performed by: INTERNAL MEDICINE

## 2021-03-12 PROCEDURE — 85025 COMPLETE CBC W/AUTO DIFF WBC: CPT

## 2021-03-12 PROCEDURE — 94640 AIRWAY INHALATION TREATMENT: CPT

## 2021-03-12 PROCEDURE — 82962 GLUCOSE BLOOD TEST: CPT | Mod: 91

## 2021-03-12 PROCEDURE — 700111 HCHG RX REV CODE 636 W/ 250 OVERRIDE (IP): Performed by: HOSPITALIST

## 2021-03-12 PROCEDURE — 36415 COLL VENOUS BLD VENIPUNCTURE: CPT

## 2021-03-12 RX ORDER — IPRATROPIUM BROMIDE AND ALBUTEROL SULFATE 2.5; .5 MG/3ML; MG/3ML
3 SOLUTION RESPIRATORY (INHALATION)
Status: DISCONTINUED | OUTPATIENT
Start: 2021-03-12 | End: 2021-03-19 | Stop reason: HOSPADM

## 2021-03-12 RX ADMIN — IPRATROPIUM BROMIDE AND ALBUTEROL SULFATE 3 ML: .5; 2.5 SOLUTION RESPIRATORY (INHALATION) at 10:28

## 2021-03-12 RX ADMIN — VALSARTAN 320 MG: 80 TABLET, FILM COATED ORAL at 22:30

## 2021-03-12 RX ADMIN — IPRATROPIUM BROMIDE AND ALBUTEROL SULFATE 3 ML: .5; 2.5 SOLUTION RESPIRATORY (INHALATION) at 07:00

## 2021-03-12 RX ADMIN — HYDRALAZINE HYDROCHLORIDE 25 MG: 25 TABLET, FILM COATED ORAL at 22:29

## 2021-03-12 RX ADMIN — INSULIN HUMAN 3 UNITS: 100 INJECTION, SOLUTION PARENTERAL at 12:06

## 2021-03-12 RX ADMIN — GABAPENTIN 600 MG: 300 CAPSULE ORAL at 22:29

## 2021-03-12 RX ADMIN — OMEPRAZOLE 40 MG: 20 CAPSULE, DELAYED RELEASE ORAL at 05:46

## 2021-03-12 RX ADMIN — CLOPIDOGREL BISULFATE 75 MG: 75 TABLET ORAL at 05:46

## 2021-03-12 RX ADMIN — OXYCODONE 5 MG: 5 TABLET ORAL at 22:29

## 2021-03-12 RX ADMIN — ASPIRIN 81 MG: 81 TABLET, COATED ORAL at 05:47

## 2021-03-12 RX ADMIN — DOCUSATE SODIUM 50 MG AND SENNOSIDES 8.6 MG 2 TABLET: 8.6; 5 TABLET, FILM COATED ORAL at 17:12

## 2021-03-12 RX ADMIN — ATORVASTATIN CALCIUM 40 MG: 40 TABLET, FILM COATED ORAL at 22:29

## 2021-03-12 RX ADMIN — INSULIN HUMAN 3 UNITS: 100 INJECTION, SOLUTION PARENTERAL at 05:54

## 2021-03-12 RX ADMIN — HYDRALAZINE HYDROCHLORIDE 25 MG: 25 TABLET, FILM COATED ORAL at 05:47

## 2021-03-12 RX ADMIN — HYDRALAZINE HYDROCHLORIDE 25 MG: 25 TABLET, FILM COATED ORAL at 14:11

## 2021-03-12 RX ADMIN — TIOTROPIUM BROMIDE INHALATION SPRAY 5 MCG: 3.12 SPRAY, METERED RESPIRATORY (INHALATION) at 07:00

## 2021-03-12 RX ADMIN — AMLODIPINE BESYLATE 10 MG: 10 TABLET ORAL at 05:46

## 2021-03-12 RX ADMIN — SITAGLIPTIN 100 MG: 100 TABLET, FILM COATED ORAL at 05:47

## 2021-03-12 RX ADMIN — ENOXAPARIN SODIUM 40 MG: 40 INJECTION SUBCUTANEOUS at 05:46

## 2021-03-12 RX ADMIN — LABETALOL HYDROCHLORIDE 10 MG: 5 INJECTION, SOLUTION INTRAVENOUS at 15:56

## 2021-03-12 RX ADMIN — OXYCODONE 5 MG: 5 TABLET ORAL at 13:21

## 2021-03-12 RX ADMIN — DOCUSATE SODIUM 50 MG AND SENNOSIDES 8.6 MG 2 TABLET: 8.6; 5 TABLET, FILM COATED ORAL at 05:47

## 2021-03-12 RX ADMIN — LIDOCAINE 1 PATCH: 50 PATCH TOPICAL at 12:12

## 2021-03-12 ASSESSMENT — ENCOUNTER SYMPTOMS
MYALGIAS: 0
ABDOMINAL PAIN: 0
EYES NEGATIVE: 1
SPUTUM PRODUCTION: 0
VOMITING: 0
SHORTNESS OF BREATH: 0
HEADACHES: 1
WEIGHT LOSS: 0
DEPRESSION: 0
WEAKNESS: 0
GASTROINTESTINAL NEGATIVE: 1
DIAPHORESIS: 0
SORE THROAT: 0
BRUISES/BLEEDS EASILY: 0
COUGH: 1
CHILLS: 0
NAUSEA: 0
FEVER: 0
MEMORY LOSS: 1
PALPITATIONS: 0
FOCAL WEAKNESS: 1
BLURRED VISION: 0
CONSTITUTIONAL NEGATIVE: 1
MUSCULOSKELETAL NEGATIVE: 1
DIZZINESS: 0

## 2021-03-12 ASSESSMENT — PAIN DESCRIPTION - PAIN TYPE
TYPE: ACUTE PAIN

## 2021-03-12 ASSESSMENT — LIFESTYLE VARIABLES: SUBSTANCE_ABUSE: 0

## 2021-03-12 NOTE — PROGRESS NOTES
Assumed care at 1900. Received bedside report from STEFFEN Higgins. Patient was asleep and resting comfortably in bed. No signs of distress. Bed is in low and locked position. Non-slip socks in place. Call light is within reach. Bed alarm is on. Hourly rounding in place.

## 2021-03-12 NOTE — DISCHARGE PLANNING
Received Choice form at 1605  Agency/Facility Name: 1) [facility]  Referral sent per Choice form at 1610    1610- Spoke To: Jo  Agency/Facility Name: [facility]  Plan or Request: asking for  PT/OT notes and good d/c plan.

## 2021-03-12 NOTE — PROGRESS NOTES
Assume care of patient at 0700. Report received from NOC Shift RN. Patient is A/O x3. Reorient the patient on event or situation. Pain is 0/10. Patient is resting in bed. Bed in lowest and locked position, call light within reach, hourly rounding in place. Labs reviewed. Communication board updated. Will continue to monitor.

## 2021-03-12 NOTE — CARE PLAN
Problem: Safety  Goal: Will remain free from falls  Outcome: PROGRESSING AS EXPECTED  Note: Remind the patient to use call light for assistance.     Problem: Psychosocial Needs:  Goal: Level of anxiety will decrease  Outcome: PROGRESSING AS EXPECTED  Note: Reorient the patient on event or situation.

## 2021-03-12 NOTE — DISCHARGE PLANNING
Anticipated Discharge Disposition: SNF    Action: LSW discussed Pt with [facility] Liaison Mauricio and discussed Pt's potential as a long tern placement vs Pt's desire to rehab and return to his baseline living independently. LSW informed [facility] of [facility] as well. Mauricio reports they will accept Pt pending insurance auth.     Barriers to Discharge: Insurance Auth    Plan: LSW to f/u with [facility] later today for insurance update.

## 2021-03-12 NOTE — CARE PLAN
Problem: Communication  Goal: The ability to communicate needs accurately and effectively will improve  Outcome: PROGRESSING AS EXPECTED     Problem: Bowel/Gastric:  Goal: Will not experience complications related to bowel motility  Outcome: PROGRESSING SLOWER THAN EXPECTED     Problem: Pain Management  Goal: Pain level will decrease to patient's comfort goal  Flowsheets  Taken 3/11/2021 2140 by Kaylie Diaz RDEBORAH  Comfort Goal:   Comfort with Movement   Sleep Comfortably  Pain Rating Scale (NPRS): 8  Taken 3/11/2021 0748 by Jon Villa, OT  Therapist Pain Assessment: (c/o severe pn R LE with any wt bearing or movement) Nurse Notified  Taken 3/9/2021 2000 by Jyoti Leonardo RRinaNRina  Non Verbal Scale:   Grimacing   Unlabored Breathing  Hannon-Donovan Scale: 4

## 2021-03-13 LAB
ALBUMIN SERPL BCP-MCNC: 3.2 G/DL (ref 3.2–4.9)
ALBUMIN/GLOB SERPL: 1.1 G/DL
ALP SERPL-CCNC: 84 U/L (ref 30–99)
ALT SERPL-CCNC: 19 U/L (ref 2–50)
ANION GAP SERPL CALC-SCNC: 7 MMOL/L (ref 7–16)
AST SERPL-CCNC: 10 U/L (ref 12–45)
BASOPHILS # BLD AUTO: 0.2 % (ref 0–1.8)
BASOPHILS # BLD: 0.03 K/UL (ref 0–0.12)
BILIRUB SERPL-MCNC: 0.3 MG/DL (ref 0.1–1.5)
BUN SERPL-MCNC: 21 MG/DL (ref 8–22)
CALCIUM SERPL-MCNC: 8.8 MG/DL (ref 8.5–10.5)
CHLORIDE SERPL-SCNC: 103 MMOL/L (ref 96–112)
CO2 SERPL-SCNC: 25 MMOL/L (ref 20–33)
CREAT SERPL-MCNC: 0.84 MG/DL (ref 0.5–1.4)
EOSINOPHIL # BLD AUTO: 0.38 K/UL (ref 0–0.51)
EOSINOPHIL NFR BLD: 2.4 % (ref 0–6.9)
ERYTHROCYTE [DISTWIDTH] IN BLOOD BY AUTOMATED COUNT: 45.4 FL (ref 35.9–50)
GLOBULIN SER CALC-MCNC: 2.9 G/DL (ref 1.9–3.5)
GLUCOSE BLD-MCNC: 132 MG/DL (ref 65–99)
GLUCOSE BLD-MCNC: 149 MG/DL (ref 65–99)
GLUCOSE BLD-MCNC: 171 MG/DL (ref 65–99)
GLUCOSE BLD-MCNC: 197 MG/DL (ref 65–99)
GLUCOSE SERPL-MCNC: 167 MG/DL (ref 65–99)
HCT VFR BLD AUTO: 48.7 % (ref 42–52)
HGB BLD-MCNC: 16.5 G/DL (ref 14–18)
IMM GRANULOCYTES # BLD AUTO: 0.11 K/UL (ref 0–0.11)
IMM GRANULOCYTES NFR BLD AUTO: 0.7 % (ref 0–0.9)
LYMPHOCYTES # BLD AUTO: 2.63 K/UL (ref 1–4.8)
LYMPHOCYTES NFR BLD: 16.8 % (ref 22–41)
MCH RBC QN AUTO: 30.3 PG (ref 27–33)
MCHC RBC AUTO-ENTMCNC: 33.9 G/DL (ref 33.7–35.3)
MCV RBC AUTO: 89.4 FL (ref 81.4–97.8)
MONOCYTES # BLD AUTO: 1.15 K/UL (ref 0–0.85)
MONOCYTES NFR BLD AUTO: 7.3 % (ref 0–13.4)
NEUTROPHILS # BLD AUTO: 11.37 K/UL (ref 1.82–7.42)
NEUTROPHILS NFR BLD: 72.6 % (ref 44–72)
NRBC # BLD AUTO: 0 K/UL
NRBC BLD-RTO: 0 /100 WBC
PLATELET # BLD AUTO: 293 K/UL (ref 164–446)
PMV BLD AUTO: 8.6 FL (ref 9–12.9)
POTASSIUM SERPL-SCNC: 3.5 MMOL/L (ref 3.6–5.5)
PROT SERPL-MCNC: 6.1 G/DL (ref 6–8.2)
RBC # BLD AUTO: 5.45 M/UL (ref 4.7–6.1)
SODIUM SERPL-SCNC: 135 MMOL/L (ref 135–145)
WBC # BLD AUTO: 15.7 K/UL (ref 4.8–10.8)

## 2021-03-13 PROCEDURE — 99231 SBSQ HOSP IP/OBS SF/LOW 25: CPT | Performed by: HOSPITALIST

## 2021-03-13 PROCEDURE — A9270 NON-COVERED ITEM OR SERVICE: HCPCS | Performed by: INTERNAL MEDICINE

## 2021-03-13 PROCEDURE — 770006 HCHG ROOM/CARE - MED/SURG/GYN SEMI*

## 2021-03-13 PROCEDURE — A9270 NON-COVERED ITEM OR SERVICE: HCPCS | Performed by: HOSPITALIST

## 2021-03-13 PROCEDURE — 700102 HCHG RX REV CODE 250 W/ 637 OVERRIDE(OP): Performed by: HOSPITALIST

## 2021-03-13 PROCEDURE — 700111 HCHG RX REV CODE 636 W/ 250 OVERRIDE (IP): Performed by: HOSPITALIST

## 2021-03-13 PROCEDURE — 82962 GLUCOSE BLOOD TEST: CPT | Mod: 91

## 2021-03-13 PROCEDURE — 700101 HCHG RX REV CODE 250: Performed by: HOSPITALIST

## 2021-03-13 PROCEDURE — 80053 COMPREHEN METABOLIC PANEL: CPT

## 2021-03-13 PROCEDURE — 36415 COLL VENOUS BLD VENIPUNCTURE: CPT

## 2021-03-13 PROCEDURE — 700102 HCHG RX REV CODE 250 W/ 637 OVERRIDE(OP): Performed by: INTERNAL MEDICINE

## 2021-03-13 PROCEDURE — 85025 COMPLETE CBC W/AUTO DIFF WBC: CPT

## 2021-03-13 RX ADMIN — LIDOCAINE 1 PATCH: 50 PATCH TOPICAL at 12:13

## 2021-03-13 RX ADMIN — OMEPRAZOLE 40 MG: 20 CAPSULE, DELAYED RELEASE ORAL at 05:48

## 2021-03-13 RX ADMIN — SITAGLIPTIN 100 MG: 100 TABLET, FILM COATED ORAL at 05:48

## 2021-03-13 RX ADMIN — INSULIN HUMAN 3 UNITS: 100 INJECTION, SOLUTION PARENTERAL at 12:09

## 2021-03-13 RX ADMIN — TIOTROPIUM BROMIDE INHALATION SPRAY 5 MCG: 3.12 SPRAY, METERED RESPIRATORY (INHALATION) at 08:28

## 2021-03-13 RX ADMIN — GABAPENTIN 600 MG: 300 CAPSULE ORAL at 20:26

## 2021-03-13 RX ADMIN — OXYCODONE 5 MG: 5 TABLET ORAL at 20:26

## 2021-03-13 RX ADMIN — ENOXAPARIN SODIUM 40 MG: 40 INJECTION SUBCUTANEOUS at 05:48

## 2021-03-13 RX ADMIN — OXYCODONE 5 MG: 5 TABLET ORAL at 05:48

## 2021-03-13 RX ADMIN — HYDRALAZINE HYDROCHLORIDE 25 MG: 25 TABLET, FILM COATED ORAL at 13:50

## 2021-03-13 RX ADMIN — CLOPIDOGREL BISULFATE 75 MG: 75 TABLET ORAL at 05:49

## 2021-03-13 RX ADMIN — HYDRALAZINE HYDROCHLORIDE 25 MG: 25 TABLET, FILM COATED ORAL at 05:49

## 2021-03-13 RX ADMIN — ASPIRIN 81 MG: 81 TABLET, COATED ORAL at 05:49

## 2021-03-13 RX ADMIN — LABETALOL HYDROCHLORIDE 20 MG: 5 INJECTION, SOLUTION INTRAVENOUS at 22:05

## 2021-03-13 RX ADMIN — DOCUSATE SODIUM 50 MG AND SENNOSIDES 8.6 MG 2 TABLET: 8.6; 5 TABLET, FILM COATED ORAL at 05:48

## 2021-03-13 RX ADMIN — INSULIN HUMAN 3 UNITS: 100 INJECTION, SOLUTION PARENTERAL at 20:30

## 2021-03-13 RX ADMIN — VALSARTAN 320 MG: 80 TABLET, FILM COATED ORAL at 20:26

## 2021-03-13 RX ADMIN — HYDRALAZINE HYDROCHLORIDE 25 MG: 25 TABLET, FILM COATED ORAL at 20:26

## 2021-03-13 RX ADMIN — ATORVASTATIN CALCIUM 40 MG: 40 TABLET, FILM COATED ORAL at 20:26

## 2021-03-13 RX ADMIN — DOCUSATE SODIUM 50 MG AND SENNOSIDES 8.6 MG 2 TABLET: 8.6; 5 TABLET, FILM COATED ORAL at 17:07

## 2021-03-13 RX ADMIN — AMLODIPINE BESYLATE 10 MG: 10 TABLET ORAL at 05:49

## 2021-03-13 ASSESSMENT — ENCOUNTER SYMPTOMS
DIZZINESS: 0
COUGH: 1
DIAPHORESIS: 0
PALPITATIONS: 0
CONSTITUTIONAL NEGATIVE: 1
WEIGHT LOSS: 0
BRUISES/BLEEDS EASILY: 0
NAUSEA: 0
MYALGIAS: 0
ABDOMINAL PAIN: 0
HEADACHES: 1
GASTROINTESTINAL NEGATIVE: 1
SORE THROAT: 0
FEVER: 0
VOMITING: 0
MUSCULOSKELETAL NEGATIVE: 1
FOCAL WEAKNESS: 1
WEAKNESS: 0
MEMORY LOSS: 1
SPUTUM PRODUCTION: 0
EYES NEGATIVE: 1
DEPRESSION: 0
CHILLS: 0
BLURRED VISION: 0
SHORTNESS OF BREATH: 0

## 2021-03-13 ASSESSMENT — PAIN SCALES - WONG BAKER: WONGBAKER_NUMERICALRESPONSE: HURTS JUST A LITTLE BIT

## 2021-03-13 ASSESSMENT — PAIN DESCRIPTION - PAIN TYPE
TYPE: ACUTE PAIN
TYPE: ACUTE PAIN

## 2021-03-13 ASSESSMENT — LIFESTYLE VARIABLES: SUBSTANCE_ABUSE: 0

## 2021-03-13 ASSESSMENT — FIBROSIS 4 INDEX: FIB4 SCORE: 0.53

## 2021-03-13 NOTE — PROGRESS NOTES
[facility] Medicine Daily Progress Note    Date of Service  3/12/2021    Chief Complaint  66 y.o. male admitted 3/5/2021 with past medical history of stroke with right-sided hemiplegia, diabetes, COPD who presents to the ER with a syncope.    Hospital Course  Patient was found to be hypoxic and revealing on 15 L nonrebreather.  He was given duo nebs, steroids and improved his oxygen to 4 L.  CT chest abdomen pelvis revealed no abnormalities.  CT scan T-spine found chronic compression fractures.  CT head found no abnormalities.  Chest x-ray found increased intravascular congestion.  Procalcitonin was 0.17.  Troponin were equivocal.  EKG found sinus tachycardia with poor progression.  Patient was started on steroids and inhalers for COPD exacerbation.    Interval Problem Update  No acute events overnight, on room air during my exam. Denies any shortness of breath. Glucose improved. HTN improved.  SLP eval results noted and appreciated.    Consultants/Specialty  None    Code Status  DNAR/DNI    Disposition  Will need 24/7 supervision per SLP upon discharge.  This will require SNF placement as this cannot be achieved in the community with his current supports.      Review of Systems  Review of Systems   Constitutional: Negative.  Negative for chills, diaphoresis, fever, malaise/fatigue and weight loss.   HENT: Negative.  Negative for sore throat.    Eyes: Negative.  Negative for blurred vision.   Respiratory: Positive for cough. Negative for sputum production and shortness of breath.    Cardiovascular: Positive for chest pain (right sided chest wall). Negative for palpitations and leg swelling.   Gastrointestinal: Negative.  Negative for abdominal pain, nausea and vomiting.   Genitourinary: Negative.  Negative for dysuria.   Musculoskeletal: Negative.  Negative for myalgias.   Skin: Negative.  Negative for itching and rash.   Neurological: Positive for focal weakness (r sided, per pt stable x 10 years) and headaches.  Negative for dizziness and weakness.   Endo/Heme/Allergies: Negative.  Does not bruise/bleed easily.   Psychiatric/Behavioral: Positive for memory loss. Negative for depression, substance abuse and suicidal ideas.   All other systems reviewed and are negative.       Physical Exam  Temp:  [36.1 °C (97 °F)-36.6 °C (97.8 °F)] 36.3 °C (97.3 °F)  Pulse:  [71-95] 84  Resp:  [16-19] 17  BP: (113-175)/(71-98) 164/77  SpO2:  [90 %-93 %] 93 %    Physical Exam  Vitals and nursing note reviewed. Exam conducted with a chaperone present.   Constitutional:       General: He is not in acute distress.     Appearance: Normal appearance. He is not diaphoretic.      Comments: Unkempt  Speaking in full sentences with out difficulty   HENT:      Head:      Comments: Bruise to left forehead     Nose: Nose normal.      Mouth/Throat:      Mouth: Mucous membranes are moist.   Eyes:      Pupils: Pupils are equal, round, and reactive to light.   Cardiovascular:      Rate and Rhythm: Normal rate and regular rhythm.      Pulses: Normal pulses.      Heart sounds: Normal heart sounds.   Pulmonary:      Effort: Pulmonary effort is normal. No respiratory distress.      Breath sounds: No wheezing.      Comments: On room air   Chest:      Chest wall: Tenderness (to palpation, right chest, no s/o trauma) present.   Abdominal:      General: Abdomen is flat. Bowel sounds are normal.      Palpations: Abdomen is soft.   Musculoskeletal:         General: No swelling or deformity. Normal range of motion.   Skin:     General: Skin is warm and dry.      Capillary Refill: Capillary refill takes less than 2 seconds.      Findings: Bruising (right shin) present.   Neurological:      Mental Status: He is alert.      Cranial Nerves: No cranial nerve deficit.      Motor: Weakness (right sided hemiparesis, chronic per pt) present.      Comments: Oriented to person and place, not time or situation    Psychiatric:         Mood and Affect: Mood normal.         Behavior:  Behavior normal.         Current Facility-Administered Medications:   •  ipratropium-albuterol (DUONEB) nebulizer solution, 3 mL, Nebulization, Q4H PRN (RT), Yaya Garcia M.D.  •  tiotropium (Spiriva Respimat) 2.5 mcg/Act inhalation spray 5 mcg, 5 mcg, Inhalation, QDAILY (RT), Yaya Garcia M.D., 5 mcg at 03/12/21 0700  •  hydrALAZINE (APRESOLINE) tablet 25 mg, 25 mg, Oral, Q8HRS, Wendi Isbell DSADAF, 25 mg at 03/12/21 1411  •  insulin regular (HumuLIN R,NovoLIN R) injection, 3-14 Units, Subcutaneous, 4X/DAY ACHS, 3 Units at 03/12/21 1206 **AND** POC Blood Glucose, , , Q AC AND BEDTIME(S) **AND** NOTIFY MD and PharmD, , , Once **AND** glucose 4 g chewable tablet 16 g, 16 g, Oral, Q15 MIN PRN **AND** dextrose 50% (D50W) injection 50 mL, 50 mL, Intravenous, Q15 MIN PRN, Elana Nguyen M.D.  •  ketorolac (TORADOL) injection 15 mg, 15 mg, Intravenous, Q6HRS PRN, Elana Nguyen M.D., 15 mg at 03/11/21 1223  •  omeprazole (PRILOSEC) capsule 40 mg, 40 mg, Oral, DAILY, Elana Nguyen M.D., 40 mg at 03/12/21 0546  •  lidocaine (LIDODERM) 5 % 1 Patch, 1 Patch, Transdermal, Q24HR, Elana Nguyen M.D., 1 Patch at 03/12/21 1212  •  oxyCODONE immediate-release (ROXICODONE) tablet 5 mg, 5 mg, Oral, Q4HRS PRN, Elana Nguyen M.D., 5 mg at 03/12/21 1321  •  labetalol (NORMODYNE/TRANDATE) injection 10-20 mg, 10-20 mg, Intravenous, Q6HRS PRN, Elana Nguyen M.D., 10 mg at 03/09/21 0412  •  aspirin EC (ECOTRIN) tablet 81 mg, 81 mg, Oral, DAILY, Sharmin Barkley M.D., 81 mg at 03/12/21 0547  •  Respiratory Therapy Consult, , Nebulization, Continuous RT, Sharmin Barkley M.D.  •  albuterol inhaler 2 Puff, 2 Puff, Inhalation, Q6HRS PRN, Sharmin Barkley M.D.  •  SITagliptin (JANUVIA) tablet 100 mg, 100 mg, Oral, DAILY, Sharmin Barkley M.D., 100 mg at 03/12/21 0547  •  amLODIPine (NORVASC) tablet 10 mg, 10 mg, Oral, DAILY, Sharmin Barkley M.D., 10 mg at 03/12/21 0546  •  atorvastatin (LIPITOR) tablet 40 mg, 40 mg, Oral, Nightly, Sharmin Barkley M.D., 40  mg at 03/11/21 2144  •  clopidogrel (PLAVIX) tablet 75 mg, 75 mg, Oral, DAILY, Sharmin Barkley M.D., 75 mg at 03/12/21 0546  •  gabapentin (NEURONTIN) capsule 600 mg, 600 mg, Oral, QHS, Sharmin Barkley M.D., 600 mg at 03/11/21 2144  •  valsartan (DIOVAN) tablet 320 mg, 320 mg, Oral, QHS, Sharmin Barkley M.D., 320 mg at 03/11/21 2145  •  senna-docusate (PERICOLACE or SENOKOT S) 8.6-50 MG per tablet 2 tablet, 2 tablet, Oral, BID, 2 tablet at 03/12/21 0547 **AND** polyethylene glycol/lytes (MIRALAX) PACKET 1 Packet, 1 Packet, Oral, QDAY PRN **AND** magnesium hydroxide (MILK OF MAGNESIA) suspension 30 mL, 30 mL, Oral, QDAY PRN **AND** bisacodyl (DULCOLAX) suppository 10 mg, 10 mg, Rectal, QDAY PRN, Sharmin Barkley M.D.  •  enoxaparin (LOVENOX) inj 40 mg, 40 mg, Subcutaneous, DAILY, Sharmin Barkley M.D., 40 mg at 03/12/21 0546  •  acetaminophen (Tylenol) tablet 650 mg, 650 mg, Oral, Q6HRS PRN, Sharmin Barkley M.D.  •  ondansetron (ZOFRAN) syringe/vial injection 4 mg, 4 mg, Intravenous, Q4HRS PRN, Sharmin Barkley M.D.  •  ondansetron (ZOFRAN ODT) dispertab 4 mg, 4 mg, Oral, Q4HRS PRN, Sharmin Barkley M.D.      Fluids    Intake/Output Summary (Last 24 hours) at 3/12/2021 1601  Last data filed at 3/12/2021 1524  Gross per 24 hour   Intake 480 ml   Output 1500 ml   Net -1020 ml       Laboratory  Recent Labs     03/10/21  0039 03/11/21  0034 03/12/21  0124   WBC 10.9* 11.9* 12.5*   RBC 5.44 5.53 5.52   HEMOGLOBIN 16.1 16.0 16.2   HEMATOCRIT 51.5 49.4 49.1   MCV 94.7 89.3 88.9   MCH 29.6 28.9 29.3   MCHC 31.3* 32.4* 33.0*   RDW 48.1 44.2 45.0   PLATELETCT 290 275 329   MPV 9.5 9.4 9.0     Recent Labs     03/10/21  0039 03/11/21  0034 03/12/21  0124   SODIUM 132* 141 143   POTASSIUM 4.2 3.6 3.6   CHLORIDE 101 104 106   CO2 17* 23 26   GLUCOSE 136* 125* 124*   BUN 26* 26* 22   CREATININE 0.94 0.96 0.89   CALCIUM 8.9 9.1 9.0                   Imaging  EC-ECHOCARDIOGRAM COMPLETE W/O CONT   Final Result      CT-CHEST,ABDOMEN,PELVIS WITH   Final  Result      1.  No acute abnormality within the chest, abdomen, or pelvis      2.  Hepatic steatosis      3.  Aortic and branch vessel atherosclerotic plaque      4.  Incidental bilateral renal cyst      No follow up imaging is recommended per consensus guidelines of the 2019 ACR Incidental Findings Committee for probably benign incidental simple appearing renal cystic lesion(s) based on imaging criteria.         CT-TSPINE W/O PLUS RECONS   Final Result      Mild wedge deformities of T12 and T2 have a chronic appearance.      Multilevel degenerative changes as above described.         CT-LSPINE W/O PLUS RECONS   Final Result      Mild wedge deformity of T12 has a chronic appearance.      Degenerative changes as above described, including facet arthropathy.      Dorsal displacement of the coccyx which is of indeterminate age and may be chronic.      Atherosclerotic plaque.      CT-HEAD W/O   Final Result      No acute intracranial abnormality is identified.      There are periventricular and subcortical white matter changes present.  This finding is nonspecific and could be from previous small vessel ischemia, demyelination, or gliosis.      Hypodensity in the jaxon likely related to a prior lacunar infarct.      Encephalomalacia in the left occipital lobe is likely related to a prior infarct.      Left frontal scalp hematoma.      CT-CSPINE WITHOUT PLUS RECONS   Final Result      Multilevel degenerative changes as above described.      Minimal grade 1 anterolisthesis of C5 on C6.      Minimal loss of height of T2 has a chronic appearance.         DX-CHEST-LIMITED (1 VIEW)   Final Result      Mild pulmonary vascular congestion.      Atherosclerotic plaque.      Elevation of the right hemidiaphragm.              Assessment/Plan  * Acute exacerbation of chronic obstructive pulmonary disease (COPD) ([facility])  Assessment & Plan  Acute on chronic exacerbation  With suspected pneumonitis - doxy started  Not on o2 at  baseline  Continue steroids and respiratory protocol    Discharge planning issues  Assessment & Plan  Patient oriented x2   OT- Not safe to DC home as patient is unable to complete ADLs  SLP cognitive eval notes patient needs 24/7 supervision.     CM to help find NOK     Essential hypertension  Assessment & Plan  Uncontrolled  Continue current home medications  IV as needed medications have been ordered  Increased hydralazine     Weakness  Assessment & Plan  Acute on chronic  Wheelchair bound at baseline  Chronic r hemiparesis    Encephalopathy  Assessment & Plan  Baseline unknown  Unclear if he had a concussion  CT negative  [facility]  Neuro checks  Seizure precautions  Following    SLP cognitive eval pending     DM (diabetes mellitus) (HCC)  Assessment & Plan  With hyperglycemia  a1c 7.8  Hold oral meds  SSI with hypoglycemic protocol  Diabetic diet    Closed head injury  Assessment & Plan  Evidence per [facility]  Found on ground with abrasion on head with stove in front of him dented  Suspect occurred during syncopal event  CT with no acute abnormalities    Leukocytosis  Assessment & Plan  Likely 2/2 steroids  Continue doxycycline   Continue to monitor    Syncope  Assessment & Plan  With LOC  Hypoxia likely contributed  Echo- EF 55%      Acute respiratory failure with hypoxia (HCC)  Assessment & Plan  On room air today, will monitor  resolved       VTE prophylaxis: lovenox

## 2021-03-13 NOTE — DISCHARGE PLANNING
Anticipated Discharge Disposition: SNF    Action: Called [facility] to follow up on insurance auth. Spoke with Jemima who states she will check if auth came through and call LSW back.     Barriers to Discharge: Insurance auth for SNF    Plan: F/U with [facility]

## 2021-03-13 NOTE — PROGRESS NOTES
[facility] Medicine Daily Progress Note    Date of Service  3/13/2021    Chief Complaint  66 y.o. male admitted 3/5/2021 with past medical history of stroke with right-sided hemiplegia, diabetes, COPD who presents to the ER with a syncope.    Hospital Course  Patient was found to be hypoxic and revealing on 15 L nonrebreather.  He was given duo nebs, steroids and improved his oxygen to 4 L.  CT chest abdomen pelvis revealed no abnormalities.  CT scan T-spine found chronic compression fractures.  CT head found no abnormalities.  Chest x-ray found increased intravascular congestion.  Procalcitonin was 0.17.  Troponin were equivocal.  EKG found sinus tachycardia with poor progression.  Patient was started on steroids and inhalers for COPD exacerbation.    Interval Problem Update  No acute events overnight, on room air during my exam. Denies any shortness of breath. Glucose improved. HTN improved.  SLP eval results noted and appreciated.    Consultants/Specialty  None    Code Status  DNAR/DNI    Disposition  Will need 24/7 supervision per SLP upon discharge.  This will require SNF placement as this cannot be achieved in the community with his current supports.      Review of Systems  Review of Systems   Constitutional: Negative.  Negative for chills, diaphoresis, fever, malaise/fatigue and weight loss.   HENT: Negative.  Negative for sore throat.    Eyes: Negative.  Negative for blurred vision.   Respiratory: Positive for cough. Negative for sputum production and shortness of breath.    Cardiovascular: Positive for chest pain (right sided chest wall). Negative for palpitations and leg swelling.   Gastrointestinal: Negative.  Negative for abdominal pain, nausea and vomiting.   Genitourinary: Negative.  Negative for dysuria.   Musculoskeletal: Negative.  Negative for myalgias.   Skin: Negative.  Negative for itching and rash.   Neurological: Positive for focal weakness (r sided, per pt stable x 10 years) and headaches.  Negative for dizziness and weakness.   Endo/Heme/Allergies: Negative.  Does not bruise/bleed easily.   Psychiatric/Behavioral: Positive for memory loss. Negative for depression, substance abuse and suicidal ideas.   All other systems reviewed and are negative.       Physical Exam  Temp:  [36.3 °C (97.3 °F)-37.1 °C (98.8 °F)] 37.1 °C (98.8 °F)  Pulse:  [81-87] 87  Resp:  [16-18] 17  BP: (144-164)/(59-77) 152/73  SpO2:  [90 %-93 %] 90 %    Physical Exam  Vitals and nursing note reviewed. Exam conducted with a chaperone present.   Constitutional:       General: He is not in acute distress.     Appearance: Normal appearance. He is not diaphoretic.      Comments: Unkempt  Speaking in full sentences with out difficulty   HENT:      Head:      Comments: Bruise to left forehead     Nose: Nose normal.      Mouth/Throat:      Mouth: Mucous membranes are moist.   Eyes:      Pupils: Pupils are equal, round, and reactive to light.   Cardiovascular:      Rate and Rhythm: Normal rate and regular rhythm.      Pulses: Normal pulses.      Heart sounds: Normal heart sounds.   Pulmonary:      Effort: Pulmonary effort is normal. No respiratory distress.      Breath sounds: No wheezing.      Comments: On room air   Chest:      Chest wall: Tenderness (to palpation, right chest, no s/o trauma) present.   Abdominal:      General: Abdomen is flat. Bowel sounds are normal.      Palpations: Abdomen is soft.   Musculoskeletal:         General: No swelling or deformity. Normal range of motion.   Skin:     General: Skin is warm and dry.      Capillary Refill: Capillary refill takes less than 2 seconds.      Findings: Bruising (right shin) present.   Neurological:      Mental Status: He is alert.      Cranial Nerves: No cranial nerve deficit.      Motor: Weakness (right sided hemiparesis, chronic per pt) present.      Comments: Oriented to person and place, not time or situation    Psychiatric:         Mood and Affect: Mood normal.          Behavior: Behavior normal.         Current Facility-Administered Medications:   •  ipratropium-albuterol (DUONEB) nebulizer solution, 3 mL, Nebulization, Q4H PRN (RT), Yaya Garcia M.D.  •  tiotropium (Spiriva Respimat) 2.5 mcg/Act inhalation spray 5 mcg, 5 mcg, Inhalation, QDAILY (RT), Yaya Garcia M.D., 5 mcg at 03/13/21 0828  •  hydrALAZINE (APRESOLINE) tablet 25 mg, 25 mg, Oral, Q8HRS, Wendi Isbell D.O., 25 mg at 03/13/21 1350  •  insulin regular (HumuLIN R,NovoLIN R) injection, 3-14 Units, Subcutaneous, 4X/DAY ACHS, 3 Units at 03/13/21 1209 **AND** POC Blood Glucose, , , Q AC AND BEDTIME(S) **AND** NOTIFY MD and PharmD, , , Once **AND** glucose 4 g chewable tablet 16 g, 16 g, Oral, Q15 MIN PRN **AND** dextrose 50% (D50W) injection 50 mL, 50 mL, Intravenous, Q15 MIN PRN, Elana Nguyen M.D.  •  omeprazole (PRILOSEC) capsule 40 mg, 40 mg, Oral, DAILY, Elana Nguyen M.D., 40 mg at 03/13/21 0548  •  lidocaine (LIDODERM) 5 % 1 Patch, 1 Patch, Transdermal, Q24HR, Elana Nguyen M.D., 1 Patch at 03/13/21 1213  •  oxyCODONE immediate-release (ROXICODONE) tablet 5 mg, 5 mg, Oral, Q4HRS PRN, Elana Nguyen M.D., 5 mg at 03/13/21 0548  •  labetalol (NORMODYNE/TRANDATE) injection 10-20 mg, 10-20 mg, Intravenous, Q6HRS PRN, Elana Nguyen M.D., 10 mg at 03/12/21 1556  •  aspirin EC (ECOTRIN) tablet 81 mg, 81 mg, Oral, DAILY, Sharmin Barkley M.D., 81 mg at 03/13/21 0549  •  Respiratory Therapy Consult, , Nebulization, Continuous RT, Sharmin Barkley M.D.  •  albuterol inhaler 2 Puff, 2 Puff, Inhalation, Q6HRS PRN, Sharmin Barkley M.D.  •  SITagliptin (JANUVIA) tablet 100 mg, 100 mg, Oral, DAILY, Sharmin Barkley M.D., 100 mg at 03/13/21 0548  •  amLODIPine (NORVASC) tablet 10 mg, 10 mg, Oral, DAILY, Sharmin Barkley M.D., 10 mg at 03/13/21 0549  •  atorvastatin (LIPITOR) tablet 40 mg, 40 mg, Oral, Nightly, Sharmin Barkley M.D., 40 mg at 03/12/21 2229  •  clopidogrel (PLAVIX) tablet 75 mg, 75 mg, Oral, DAILY, Sharmin Barkley M.D., 75 mg  at 03/13/21 0549  •  gabapentin (NEURONTIN) capsule 600 mg, 600 mg, Oral, QHS, Sharmin Barkley M.D., 600 mg at 03/12/21 2229  •  valsartan (DIOVAN) tablet 320 mg, 320 mg, Oral, QHS, Sharmin Barkley M.D., 320 mg at 03/12/21 2230  •  senna-docusate (PERICOLACE or SENOKOT S) 8.6-50 MG per tablet 2 tablet, 2 tablet, Oral, BID, 2 tablet at 03/13/21 0548 **AND** polyethylene glycol/lytes (MIRALAX) PACKET 1 Packet, 1 Packet, Oral, QDAY PRN **AND** magnesium hydroxide (MILK OF MAGNESIA) suspension 30 mL, 30 mL, Oral, QDAY PRN **AND** bisacodyl (DULCOLAX) suppository 10 mg, 10 mg, Rectal, QDAY PRN, Sharmin Barkley M.D.  •  enoxaparin (LOVENOX) inj 40 mg, 40 mg, Subcutaneous, DAILY, Sharmin Barkley M.D., 40 mg at 03/13/21 0548  •  acetaminophen (Tylenol) tablet 650 mg, 650 mg, Oral, Q6HRS PRN, Sharmin Barkley M.D.  •  ondansetron (ZOFRAN) syringe/vial injection 4 mg, 4 mg, Intravenous, Q4HRS PRN, Sharmin Barkley M.D.  •  ondansetron (ZOFRAN ODT) dispertab 4 mg, 4 mg, Oral, Q4HRS PRN, Sharmin Barkley M.D.      Fluids    Intake/Output Summary (Last 24 hours) at 3/13/2021 1448  Last data filed at 3/13/2021 1000  Gross per 24 hour   Intake 780 ml   Output 1325 ml   Net -545 ml       Laboratory  Recent Labs     03/11/21  0034 03/12/21  0124 03/13/21  0102   WBC 11.9* 12.5* 15.7*   RBC 5.53 5.52 5.45   HEMOGLOBIN 16.0 16.2 16.5   HEMATOCRIT 49.4 49.1 48.7   MCV 89.3 88.9 89.4   MCH 28.9 29.3 30.3   MCHC 32.4* 33.0* 33.9   RDW 44.2 45.0 45.4   PLATELETCT 275 329 293   MPV 9.4 9.0 8.6*     Recent Labs     03/11/21  0034 03/12/21  0124 03/13/21  0102   SODIUM 141 143 135   POTASSIUM 3.6 3.6 3.5*   CHLORIDE 104 106 103   CO2 23 26 25   GLUCOSE 125* 124* 167*   BUN 26* 22 21   CREATININE 0.96 0.89 0.84   CALCIUM 9.1 9.0 8.8                   Imaging  EC-ECHOCARDIOGRAM COMPLETE W/O CONT   Final Result      CT-CHEST,ABDOMEN,PELVIS WITH   Final Result      1.  No acute abnormality within the chest, abdomen, or pelvis      2.  Hepatic steatosis      3.   Aortic and branch vessel atherosclerotic plaque      4.  Incidental bilateral renal cyst      No follow up imaging is recommended per consensus guidelines of the 2019 ACR Incidental Findings Committee for probably benign incidental simple appearing renal cystic lesion(s) based on imaging criteria.         CT-TSPINE W/O PLUS RECONS   Final Result      Mild wedge deformities of T12 and T2 have a chronic appearance.      Multilevel degenerative changes as above described.         CT-LSPINE W/O PLUS RECONS   Final Result      Mild wedge deformity of T12 has a chronic appearance.      Degenerative changes as above described, including facet arthropathy.      Dorsal displacement of the coccyx which is of indeterminate age and may be chronic.      Atherosclerotic plaque.      CT-HEAD W/O   Final Result      No acute intracranial abnormality is identified.      There are periventricular and subcortical white matter changes present.  This finding is nonspecific and could be from previous small vessel ischemia, demyelination, or gliosis.      Hypodensity in the jaxon likely related to a prior lacunar infarct.      Encephalomalacia in the left occipital lobe is likely related to a prior infarct.      Left frontal scalp hematoma.      CT-CSPINE WITHOUT PLUS RECONS   Final Result      Multilevel degenerative changes as above described.      Minimal grade 1 anterolisthesis of C5 on C6.      Minimal loss of height of T2 has a chronic appearance.         DX-CHEST-LIMITED (1 VIEW)   Final Result      Mild pulmonary vascular congestion.      Atherosclerotic plaque.      Elevation of the right hemidiaphragm.              Assessment/Plan  * Acute exacerbation of chronic obstructive pulmonary disease (COPD) ([facility])  Assessment & Plan  Acute on chronic exacerbation  With suspected pneumonitis - doxy started  Not on o2 at baseline  Continue steroids and respiratory protocol    Discharge planning issues  Assessment & Plan  Patient oriented x2    OT- Not safe to DC home as patient is unable to complete ADLs  SLP cognitive eval notes patient needs 24/7 supervision.     CM to help find NOK     Essential hypertension  Assessment & Plan  Uncontrolled  Continue current home medications  IV as needed medications have been ordered  Increased hydralazine     Weakness  Assessment & Plan  Acute on chronic  Wheelchair bound at baseline  Chronic r hemiparesis    Encephalopathy  Assessment & Plan  Baseline unknown  Unclear if he had a concussion  CT negative  [facility]  Neuro checks  Seizure precautions  Following    SLP cognitive eval pending     DM (diabetes mellitus) ([facility])  Assessment & Plan  With hyperglycemia  a1c 7.8  Hold oral meds  SSI with hypoglycemic protocol  Diabetic diet    Closed head injury  Assessment & Plan  Evidence per [facility]  Found on ground with abrasion on head with stove in front of him dented  Suspect occurred during syncopal event  CT with no acute abnormalities    Leukocytosis  Assessment & Plan  Likely 2/2 steroids  Continue doxycycline   Continue to monitor    Syncope  Assessment & Plan  With LOC  Hypoxia likely contributed  Echo- EF 55%      Acute respiratory failure with hypoxia ([facility])  Assessment & Plan  On room air today, will monitor  resolved       VTE prophylaxis: lovenox

## 2021-03-13 NOTE — CARE PLAN
Problem: Communication  Goal: The ability to communicate needs accurately and effectively will improve  Outcome: PROGRESSING AS EXPECTED     Problem: Bowel/Gastric:  Goal: Normal bowel function is maintained or improved  Outcome: PROGRESSING AS EXPECTED     Problem: Respiratory:  Goal: Respiratory status will improve  Outcome: PROGRESSING AS EXPECTED

## 2021-03-14 LAB
GLUCOSE BLD-MCNC: 137 MG/DL (ref 65–99)
GLUCOSE BLD-MCNC: 157 MG/DL (ref 65–99)
GLUCOSE BLD-MCNC: 171 MG/DL (ref 65–99)
GLUCOSE BLD-MCNC: 187 MG/DL (ref 65–99)

## 2021-03-14 PROCEDURE — A9270 NON-COVERED ITEM OR SERVICE: HCPCS | Performed by: INTERNAL MEDICINE

## 2021-03-14 PROCEDURE — 700102 HCHG RX REV CODE 250 W/ 637 OVERRIDE(OP): Performed by: HOSPITALIST

## 2021-03-14 PROCEDURE — A9270 NON-COVERED ITEM OR SERVICE: HCPCS | Performed by: HOSPITALIST

## 2021-03-14 PROCEDURE — 770006 HCHG ROOM/CARE - MED/SURG/GYN SEMI*

## 2021-03-14 PROCEDURE — 99231 SBSQ HOSP IP/OBS SF/LOW 25: CPT | Performed by: HOSPITALIST

## 2021-03-14 PROCEDURE — 82962 GLUCOSE BLOOD TEST: CPT | Mod: 91

## 2021-03-14 PROCEDURE — 700101 HCHG RX REV CODE 250: Performed by: HOSPITALIST

## 2021-03-14 PROCEDURE — 700102 HCHG RX REV CODE 250 W/ 637 OVERRIDE(OP): Performed by: INTERNAL MEDICINE

## 2021-03-14 PROCEDURE — 700111 HCHG RX REV CODE 636 W/ 250 OVERRIDE (IP): Performed by: HOSPITALIST

## 2021-03-14 RX ADMIN — ASPIRIN 81 MG: 81 TABLET, COATED ORAL at 05:31

## 2021-03-14 RX ADMIN — AMLODIPINE BESYLATE 10 MG: 10 TABLET ORAL at 05:31

## 2021-03-14 RX ADMIN — INSULIN HUMAN 3 UNITS: 100 INJECTION, SOLUTION PARENTERAL at 05:38

## 2021-03-14 RX ADMIN — GABAPENTIN 600 MG: 300 CAPSULE ORAL at 20:17

## 2021-03-14 RX ADMIN — HYDRALAZINE HYDROCHLORIDE 25 MG: 25 TABLET, FILM COATED ORAL at 20:25

## 2021-03-14 RX ADMIN — SITAGLIPTIN 100 MG: 100 TABLET, FILM COATED ORAL at 05:31

## 2021-03-14 RX ADMIN — CLOPIDOGREL BISULFATE 75 MG: 75 TABLET ORAL at 05:31

## 2021-03-14 RX ADMIN — INSULIN HUMAN 3 UNITS: 100 INJECTION, SOLUTION PARENTERAL at 20:20

## 2021-03-14 RX ADMIN — HYDRALAZINE HYDROCHLORIDE 25 MG: 25 TABLET, FILM COATED ORAL at 13:45

## 2021-03-14 RX ADMIN — HYDRALAZINE HYDROCHLORIDE 25 MG: 25 TABLET, FILM COATED ORAL at 05:31

## 2021-03-14 RX ADMIN — VALSARTAN 320 MG: 80 TABLET, FILM COATED ORAL at 20:17

## 2021-03-14 RX ADMIN — ENOXAPARIN SODIUM 40 MG: 40 INJECTION SUBCUTANEOUS at 05:31

## 2021-03-14 RX ADMIN — DOCUSATE SODIUM 50 MG AND SENNOSIDES 8.6 MG 2 TABLET: 8.6; 5 TABLET, FILM COATED ORAL at 17:17

## 2021-03-14 RX ADMIN — TIOTROPIUM BROMIDE INHALATION SPRAY 5 MCG: 3.12 SPRAY, METERED RESPIRATORY (INHALATION) at 08:16

## 2021-03-14 RX ADMIN — INSULIN HUMAN 3 UNITS: 100 INJECTION, SOLUTION PARENTERAL at 17:13

## 2021-03-14 RX ADMIN — ATORVASTATIN CALCIUM 40 MG: 40 TABLET, FILM COATED ORAL at 20:17

## 2021-03-14 RX ADMIN — OMEPRAZOLE 40 MG: 20 CAPSULE, DELAYED RELEASE ORAL at 05:31

## 2021-03-14 RX ADMIN — OXYCODONE 5 MG: 5 TABLET ORAL at 05:34

## 2021-03-14 RX ADMIN — LIDOCAINE 1 PATCH: 50 PATCH TOPICAL at 12:07

## 2021-03-14 RX ADMIN — OXYCODONE 5 MG: 5 TABLET ORAL at 20:17

## 2021-03-14 ASSESSMENT — ENCOUNTER SYMPTOMS
NAUSEA: 0
CONSTITUTIONAL NEGATIVE: 1
EYES NEGATIVE: 1
VOMITING: 0
GASTROINTESTINAL NEGATIVE: 1
SORE THROAT: 0
WEIGHT LOSS: 0
PALPITATIONS: 0
HEADACHES: 1
CHILLS: 0
MYALGIAS: 0
DIAPHORESIS: 0
BLURRED VISION: 0
SPUTUM PRODUCTION: 0
BRUISES/BLEEDS EASILY: 0
ABDOMINAL PAIN: 0
MUSCULOSKELETAL NEGATIVE: 1
FOCAL WEAKNESS: 1
WEAKNESS: 0
COUGH: 1
DEPRESSION: 0
DIZZINESS: 0
FEVER: 0
SHORTNESS OF BREATH: 0
MEMORY LOSS: 1

## 2021-03-14 ASSESSMENT — PAIN DESCRIPTION - PAIN TYPE
TYPE: ACUTE PAIN

## 2021-03-14 ASSESSMENT — LIFESTYLE VARIABLES: SUBSTANCE_ABUSE: 0

## 2021-03-14 NOTE — PROGRESS NOTES
[facility] Medicine Daily Progress Note    Date of Service  3/14/2021    Chief Complaint  66 y.o. male admitted 3/5/2021 with past medical history of stroke with right-sided hemiplegia, diabetes, COPD who presents to the ER with a syncope.    Hospital Course  Patient was found to be hypoxic and revealing on 15 L nonrebreather.  He was given duo nebs, steroids and improved his oxygen to 4 L.  CT chest abdomen pelvis revealed no abnormalities.  CT scan T-spine found chronic compression fractures.  CT head found no abnormalities.  Chest x-ray found increased intravascular congestion.  Procalcitonin was 0.17.  Troponin were equivocal.  EKG found sinus tachycardia with poor progression.  Patient was started on steroids and inhalers for COPD exacerbation.    Interval Problem Update  No acute events overnight, on room air during my exam. Denies any shortness of breath. Glucose improved. HTN improved.  SLP eval results noted and appreciated.    Consultants/Specialty  None    Code Status  DNAR/DNI    Disposition  Will need 24/7 supervision per SLP upon discharge.  This will require SNF placement as this cannot be achieved in the community with his current supports.      Review of Systems  Review of Systems   Constitutional: Negative.  Negative for chills, diaphoresis, fever, malaise/fatigue and weight loss.   HENT: Negative.  Negative for sore throat.    Eyes: Negative.  Negative for blurred vision.   Respiratory: Positive for cough. Negative for sputum production and shortness of breath.    Cardiovascular: Positive for chest pain (right sided chest wall). Negative for palpitations and leg swelling.   Gastrointestinal: Negative.  Negative for abdominal pain, nausea and vomiting.   Genitourinary: Negative.  Negative for dysuria.   Musculoskeletal: Negative.  Negative for myalgias.   Skin: Negative.  Negative for itching and rash.   Neurological: Positive for focal weakness (r sided, per pt stable x 10 years) and headaches.  Negative for dizziness and weakness.   Endo/Heme/Allergies: Negative.  Does not bruise/bleed easily.   Psychiatric/Behavioral: Positive for memory loss. Negative for depression, substance abuse and suicidal ideas.   All other systems reviewed and are negative.       Physical Exam  Temp:  [36.5 °C (97.7 °F)-37.2 °C (98.9 °F)] 36.9 °C (98.4 °F)  Pulse:  [75-95] 83  Resp:  [17-18] 18  BP: (125-177)/(59-76) 127/62  SpO2:  [90 %-97 %] 91 %    Physical Exam  Vitals and nursing note reviewed. Exam conducted with a chaperone present.   Constitutional:       General: He is not in acute distress.     Appearance: Normal appearance. He is not diaphoretic.      Comments: Unkempt  Speaking in full sentences with out difficulty   HENT:      Head:      Comments: Bruise to left forehead     Nose: Nose normal.      Mouth/Throat:      Mouth: Mucous membranes are moist.   Eyes:      Pupils: Pupils are equal, round, and reactive to light.   Cardiovascular:      Rate and Rhythm: Normal rate and regular rhythm.      Pulses: Normal pulses.      Heart sounds: Normal heart sounds.   Pulmonary:      Effort: Pulmonary effort is normal. No respiratory distress.      Breath sounds: No wheezing.      Comments: On room air   Chest:      Chest wall: Tenderness (to palpation, right chest, no s/o trauma) present.   Abdominal:      General: Abdomen is flat. Bowel sounds are normal.      Palpations: Abdomen is soft.   Musculoskeletal:         General: No swelling or deformity. Normal range of motion.   Skin:     General: Skin is warm and dry.      Capillary Refill: Capillary refill takes less than 2 seconds.      Findings: Bruising (right shin) present.   Neurological:      Mental Status: He is alert.      Cranial Nerves: No cranial nerve deficit.      Motor: Weakness (right sided hemiparesis, chronic per pt) present.      Comments: Oriented to person and place, not time or situation    Psychiatric:         Mood and Affect: Mood normal.          Behavior: Behavior normal.         Current Facility-Administered Medications:   •  ipratropium-albuterol (DUONEB) nebulizer solution, 3 mL, Nebulization, Q4H PRN (RT), Yaya Garcia M.D.  •  tiotropium (Spiriva Respimat) 2.5 mcg/Act inhalation spray 5 mcg, 5 mcg, Inhalation, QDAILY (RT), Yaya Garcia M.D., 5 mcg at 03/14/21 0816  •  hydrALAZINE (APRESOLINE) tablet 25 mg, 25 mg, Oral, Q8HRS, Wendi Isbell D.O., 25 mg at 03/14/21 1345  •  insulin regular (HumuLIN R,NovoLIN R) injection, 3-14 Units, Subcutaneous, 4X/DAY ACHS, Stopped at 03/14/21 1205 **AND** POC Blood Glucose, , , Q AC AND BEDTIME(S) **AND** NOTIFY MD and PharmD, , , Once **AND** glucose 4 g chewable tablet 16 g, 16 g, Oral, Q15 MIN PRN **AND** dextrose 50% (D50W) injection 50 mL, 50 mL, Intravenous, Q15 MIN PRN, Elana Nguyen M.D.  •  omeprazole (PRILOSEC) capsule 40 mg, 40 mg, Oral, DAILY, Elana Nguyen M.D., 40 mg at 03/14/21 0531  •  lidocaine (LIDODERM) 5 % 1 Patch, 1 Patch, Transdermal, Q24HR, Elana Nguyen M.D., 1 Patch at 03/14/21 1207  •  oxyCODONE immediate-release (ROXICODONE) tablet 5 mg, 5 mg, Oral, Q4HRS PRN, Elana Nguyen M.D., 5 mg at 03/14/21 0534  •  labetalol (NORMODYNE/TRANDATE) injection 10-20 mg, 10-20 mg, Intravenous, Q6HRS PRN, Elana Nguyen M.D., 20 mg at 03/13/21 2205  •  aspirin EC (ECOTRIN) tablet 81 mg, 81 mg, Oral, DAILY, Sharmin Barkley M.D., 81 mg at 03/14/21 0531  •  Respiratory Therapy Consult, , Nebulization, Continuous RT, Sharmin Barkley M.D.  •  albuterol inhaler 2 Puff, 2 Puff, Inhalation, Q6HRS PRN, Sharmin Barkley M.D.  •  SITagliptin (JANUVIA) tablet 100 mg, 100 mg, Oral, DAILY, Sharmin Barkley M.D., 100 mg at 03/14/21 0531  •  amLODIPine (NORVASC) tablet 10 mg, 10 mg, Oral, DAILY, Sharmin Barkley M.D., 10 mg at 03/14/21 0531  •  atorvastatin (LIPITOR) tablet 40 mg, 40 mg, Oral, Nightly, Sharmin Barkley M.D., 40 mg at 03/13/21 2026  •  clopidogrel (PLAVIX) tablet 75 mg, 75 mg, Oral, DAILY, Sharmin Barkley M.D., 75 mg  at 03/14/21 0531  •  gabapentin (NEURONTIN) capsule 600 mg, 600 mg, Oral, QHS, Sharmin Barkley M.D., 600 mg at 03/13/21 2026  •  valsartan (DIOVAN) tablet 320 mg, 320 mg, Oral, QHS, Sharmin Barkley M.D., 320 mg at 03/13/21 2026  •  senna-docusate (PERICOLACE or SENOKOT S) 8.6-50 MG per tablet 2 tablet, 2 tablet, Oral, BID, Stopped at 03/14/21 0600 **AND** polyethylene glycol/lytes (MIRALAX) PACKET 1 Packet, 1 Packet, Oral, QDAY PRN **AND** magnesium hydroxide (MILK OF MAGNESIA) suspension 30 mL, 30 mL, Oral, QDAY PRN **AND** bisacodyl (DULCOLAX) suppository 10 mg, 10 mg, Rectal, QDAY PRN, Sharmin Barkley M.D.  •  enoxaparin (LOVENOX) inj 40 mg, 40 mg, Subcutaneous, DAILY, Sharmin Barkley M.D., 40 mg at 03/14/21 0531  •  acetaminophen (Tylenol) tablet 650 mg, 650 mg, Oral, Q6HRS PRN, Sharmin Barkley M.D.  •  ondansetron (ZOFRAN) syringe/vial injection 4 mg, 4 mg, Intravenous, Q4HRS PRN, Sharmin Barkley M.D.  •  ondansetron (ZOFRAN ODT) dispertab 4 mg, 4 mg, Oral, Q4HRS PRN, Sharmin Barkley M.D.      Fluids    Intake/Output Summary (Last 24 hours) at 3/14/2021 1356  Last data filed at 3/14/2021 0900  Gross per 24 hour   Intake 480 ml   Output 1025 ml   Net -545 ml       Laboratory  Recent Labs     03/12/21  0124 03/13/21  0102   WBC 12.5* 15.7*   RBC 5.52 5.45   HEMOGLOBIN 16.2 16.5   HEMATOCRIT 49.1 48.7   MCV 88.9 89.4   MCH 29.3 30.3   MCHC 33.0* 33.9   RDW 45.0 45.4   PLATELETCT 329 293   MPV 9.0 8.6*     Recent Labs     03/12/21  0124 03/13/21  0102   SODIUM 143 135   POTASSIUM 3.6 3.5*   CHLORIDE 106 103   CO2 26 25   GLUCOSE 124* 167*   BUN 22 21   CREATININE 0.89 0.84   CALCIUM 9.0 8.8                   Imaging  EC-ECHOCARDIOGRAM COMPLETE W/O CONT   Final Result      CT-CHEST,ABDOMEN,PELVIS WITH   Final Result      1.  No acute abnormality within the chest, abdomen, or pelvis      2.  Hepatic steatosis      3.  Aortic and branch vessel atherosclerotic plaque      4.  Incidental bilateral renal cyst      No follow up imaging  is recommended per consensus guidelines of the 2019 ACR Incidental Findings Committee for probably benign incidental simple appearing renal cystic lesion(s) based on imaging criteria.         CT-TSPINE W/O PLUS RECONS   Final Result      Mild wedge deformities of T12 and T2 have a chronic appearance.      Multilevel degenerative changes as above described.         CT-LSPINE W/O PLUS RECONS   Final Result      Mild wedge deformity of T12 has a chronic appearance.      Degenerative changes as above described, including facet arthropathy.      Dorsal displacement of the coccyx which is of indeterminate age and may be chronic.      Atherosclerotic plaque.      CT-HEAD W/O   Final Result      No acute intracranial abnormality is identified.      There are periventricular and subcortical white matter changes present.  This finding is nonspecific and could be from previous small vessel ischemia, demyelination, or gliosis.      Hypodensity in the jaxon likely related to a prior lacunar infarct.      Encephalomalacia in the left occipital lobe is likely related to a prior infarct.      Left frontal scalp hematoma.      CT-CSPINE WITHOUT PLUS RECONS   Final Result      Multilevel degenerative changes as above described.      Minimal grade 1 anterolisthesis of C5 on C6.      Minimal loss of height of T2 has a chronic appearance.         DX-CHEST-LIMITED (1 VIEW)   Final Result      Mild pulmonary vascular congestion.      Atherosclerotic plaque.      Elevation of the right hemidiaphragm.              Assessment/Plan  * Acute exacerbation of chronic obstructive pulmonary disease (COPD) ([facility])  Assessment & Plan  Acute on chronic exacerbation  With suspected pneumonitis - doxy started  Not on o2 at baseline  Continue steroids and respiratory protocol    Discharge planning issues  Assessment & Plan  Patient oriented x2   OT- Not safe to DC home as patient is unable to complete ADLs  SLP cognitive eval notes patient needs 24/7  supervision.     CM to help find NOK     Essential hypertension  Assessment & Plan  Uncontrolled  Continue current home medications  IV as needed medications have been ordered  Increased hydralazine     Weakness  Assessment & Plan  Acute on chronic  Wheelchair bound at baseline  Chronic r hemiparesis    Encephalopathy  Assessment & Plan  Baseline unknown  Unclear if he had a concussion  CT negative  [facility]  Neuro checks  Seizure precautions  Following    SLP cognitive eval pending     DM (diabetes mellitus) ([facility])  Assessment & Plan  With hyperglycemia  a1c 7.8  Hold oral meds  SSI with hypoglycemic protocol  Diabetic diet    Closed head injury  Assessment & Plan  Evidence per [facility]  Found on ground with abrasion on head with stove in front of him dented  Suspect occurred during syncopal event  CT with no acute abnormalities    Leukocytosis  Assessment & Plan  Likely 2/2 steroids  Continue doxycycline   Continue to monitor    Syncope  Assessment & Plan  With LOC  Hypoxia likely contributed  Echo- EF 55%      Acute respiratory failure with hypoxia ([facility])  Assessment & Plan  On room air today, will monitor  resolved       VTE prophylaxis: lovenox

## 2021-03-14 NOTE — CARE PLAN
Problem: Communication  Goal: The ability to communicate needs accurately and effectively will improve  Outcome: PROGRESSING AS EXPECTED     Problem: Safety  Goal: Will remain free from injury  Outcome: PROGRESSING AS EXPECTED  Goal: Will remain free from falls  Outcome: PROGRESSING AS EXPECTED     Patient AxO x 3; disoriented to event  Bp elevated.   Patient turned q2.   Patient has complained of pain with PRNs given as appropriate  Fall risk protocol in place. Bed locked and in lowest position. Non-skid socks and bed alarm on.  Rounded on hourly. Call light with in reach.

## 2021-03-15 LAB
GLUCOSE BLD-MCNC: 152 MG/DL (ref 65–99)
GLUCOSE BLD-MCNC: 173 MG/DL (ref 65–99)
GLUCOSE BLD-MCNC: 174 MG/DL (ref 65–99)
GLUCOSE BLD-MCNC: 177 MG/DL (ref 65–99)

## 2021-03-15 PROCEDURE — 770006 HCHG ROOM/CARE - MED/SURG/GYN SEMI*

## 2021-03-15 PROCEDURE — 700102 HCHG RX REV CODE 250 W/ 637 OVERRIDE(OP): Performed by: HOSPITALIST

## 2021-03-15 PROCEDURE — 82962 GLUCOSE BLOOD TEST: CPT | Mod: 91

## 2021-03-15 PROCEDURE — 99231 SBSQ HOSP IP/OBS SF/LOW 25: CPT | Performed by: HOSPITALIST

## 2021-03-15 PROCEDURE — 700111 HCHG RX REV CODE 636 W/ 250 OVERRIDE (IP): Performed by: HOSPITALIST

## 2021-03-15 PROCEDURE — A9270 NON-COVERED ITEM OR SERVICE: HCPCS | Performed by: HOSPITALIST

## 2021-03-15 PROCEDURE — 700102 HCHG RX REV CODE 250 W/ 637 OVERRIDE(OP): Performed by: INTERNAL MEDICINE

## 2021-03-15 PROCEDURE — 700101 HCHG RX REV CODE 250: Performed by: HOSPITALIST

## 2021-03-15 PROCEDURE — A9270 NON-COVERED ITEM OR SERVICE: HCPCS | Performed by: INTERNAL MEDICINE

## 2021-03-15 RX ADMIN — VALSARTAN 320 MG: 80 TABLET, FILM COATED ORAL at 21:28

## 2021-03-15 RX ADMIN — OXYCODONE 5 MG: 5 TABLET ORAL at 17:41

## 2021-03-15 RX ADMIN — ENOXAPARIN SODIUM 40 MG: 40 INJECTION SUBCUTANEOUS at 05:59

## 2021-03-15 RX ADMIN — DOCUSATE SODIUM 50 MG AND SENNOSIDES 8.6 MG 2 TABLET: 8.6; 5 TABLET, FILM COATED ORAL at 17:05

## 2021-03-15 RX ADMIN — GABAPENTIN 600 MG: 300 CAPSULE ORAL at 21:28

## 2021-03-15 RX ADMIN — ASPIRIN 81 MG: 81 TABLET, COATED ORAL at 05:59

## 2021-03-15 RX ADMIN — CLOPIDOGREL BISULFATE 75 MG: 75 TABLET ORAL at 05:59

## 2021-03-15 RX ADMIN — INSULIN HUMAN 3 UNITS: 100 INJECTION, SOLUTION PARENTERAL at 21:29

## 2021-03-15 RX ADMIN — OXYCODONE 5 MG: 5 TABLET ORAL at 22:27

## 2021-03-15 RX ADMIN — INSULIN HUMAN 3 UNITS: 100 INJECTION, SOLUTION PARENTERAL at 17:05

## 2021-03-15 RX ADMIN — OXYCODONE 5 MG: 5 TABLET ORAL at 06:00

## 2021-03-15 RX ADMIN — TIOTROPIUM BROMIDE INHALATION SPRAY 5 MCG: 3.12 SPRAY, METERED RESPIRATORY (INHALATION) at 08:23

## 2021-03-15 RX ADMIN — AMLODIPINE BESYLATE 10 MG: 10 TABLET ORAL at 05:59

## 2021-03-15 RX ADMIN — LIDOCAINE 1 PATCH: 50 PATCH TOPICAL at 11:54

## 2021-03-15 RX ADMIN — HYDRALAZINE HYDROCHLORIDE 25 MG: 25 TABLET, FILM COATED ORAL at 14:25

## 2021-03-15 RX ADMIN — DOCUSATE SODIUM 50 MG AND SENNOSIDES 8.6 MG 2 TABLET: 8.6; 5 TABLET, FILM COATED ORAL at 05:59

## 2021-03-15 RX ADMIN — OXYCODONE 5 MG: 5 TABLET ORAL at 12:19

## 2021-03-15 RX ADMIN — ATORVASTATIN CALCIUM 40 MG: 40 TABLET, FILM COATED ORAL at 21:28

## 2021-03-15 RX ADMIN — HYDRALAZINE HYDROCHLORIDE 25 MG: 25 TABLET, FILM COATED ORAL at 21:28

## 2021-03-15 RX ADMIN — INSULIN HUMAN 3 UNITS: 100 INJECTION, SOLUTION PARENTERAL at 11:54

## 2021-03-15 RX ADMIN — HYDRALAZINE HYDROCHLORIDE 25 MG: 25 TABLET, FILM COATED ORAL at 05:59

## 2021-03-15 RX ADMIN — OMEPRAZOLE 40 MG: 20 CAPSULE, DELAYED RELEASE ORAL at 05:59

## 2021-03-15 RX ADMIN — SITAGLIPTIN 100 MG: 100 TABLET, FILM COATED ORAL at 05:59

## 2021-03-15 ASSESSMENT — PAIN DESCRIPTION - PAIN TYPE
TYPE: ACUTE PAIN

## 2021-03-15 ASSESSMENT — ENCOUNTER SYMPTOMS
SORE THROAT: 0
CHILLS: 0
MEMORY LOSS: 1
DEPRESSION: 0
GASTROINTESTINAL NEGATIVE: 1
VOMITING: 0
MUSCULOSKELETAL NEGATIVE: 1
DIZZINESS: 0
COUGH: 1
FEVER: 0
SPUTUM PRODUCTION: 0
FOCAL WEAKNESS: 1
BLURRED VISION: 0
HEADACHES: 1
MYALGIAS: 0
EYES NEGATIVE: 1
WEIGHT LOSS: 0
SHORTNESS OF BREATH: 0
DIAPHORESIS: 0
CONSTITUTIONAL NEGATIVE: 1
NAUSEA: 0
ABDOMINAL PAIN: 0
PALPITATIONS: 0
WEAKNESS: 0
BRUISES/BLEEDS EASILY: 0

## 2021-03-15 ASSESSMENT — LIFESTYLE VARIABLES: SUBSTANCE_ABUSE: 0

## 2021-03-15 NOTE — CARE PLAN
Patient calls appropriately, large BM today  Problem: Safety  Goal: Will remain free from injury  Outcome: PROGRESSING AS EXPECTED     Problem: Bowel/Gastric:  Goal: Normal bowel function is maintained or improved  Outcome: PROGRESSING AS EXPECTED

## 2021-03-15 NOTE — PROGRESS NOTES
Assumed care at 0700 following night nurse report, assessment completed. Patient is A&O X3, disoriented to situation. Patient complains of right rib pain medication not given at this time. Fall precautions and appropriate signs in place. Call light/phone system and RN extension updated on whiteboard. Bed alarm is in use, patient is 2 person assist with standing, one person assist with bed mobility. Patient denies any additional needs at this time, Hourly rounding place.

## 2021-03-15 NOTE — PROGRESS NOTES
[facility] Medicine Daily Progress Note    Date of Service  3/15/2021    Chief Complaint  66 y.o. male admitted 3/5/2021 with past medical history of stroke with right-sided hemiplegia, diabetes, COPD who presents to the ER with a syncope.    Hospital Course  Patient was found to be hypoxic and revealing on 15 L nonrebreather.  He was given duo nebs, steroids and improved his oxygen to 4 L.  CT chest abdomen pelvis revealed no abnormalities.  CT scan T-spine found chronic compression fractures.  CT head found no abnormalities.  Chest x-ray found increased intravascular congestion.  Procalcitonin was 0.17.  Troponin were equivocal.  EKG found sinus tachycardia with poor progression.  Patient was started on steroids and inhalers for COPD exacerbation.    Interval Problem Update  No acute events overnight, on room air during my exam. Denies any shortness of breath. Glucose improved. HTN improved.  SLP eval results noted and appreciated.    Consultants/Specialty  None    Code Status  DNAR/DNI    Disposition  Will need 24/7 supervision per SLP upon discharge.  This will require SNF placement as this cannot be achieved in the community with his current supports.      Review of Systems  Review of Systems   Constitutional: Negative.  Negative for chills, diaphoresis, fever, malaise/fatigue and weight loss.   HENT: Negative.  Negative for sore throat.    Eyes: Negative.  Negative for blurred vision.   Respiratory: Positive for cough. Negative for sputum production and shortness of breath.    Cardiovascular: Positive for chest pain (right sided chest wall). Negative for palpitations and leg swelling.   Gastrointestinal: Negative.  Negative for abdominal pain, nausea and vomiting.   Genitourinary: Negative.  Negative for dysuria.   Musculoskeletal: Negative.  Negative for myalgias.   Skin: Negative.  Negative for itching and rash.   Neurological: Positive for focal weakness (r sided, per pt stable x 10 years) and headaches.  Negative for dizziness and weakness.   Endo/Heme/Allergies: Negative.  Does not bruise/bleed easily.   Psychiatric/Behavioral: Positive for memory loss. Negative for depression, substance abuse and suicidal ideas.   All other systems reviewed and are negative.       Physical Exam  Temp:  [36.2 °C (97.2 °F)-37.1 °C (98.7 °F)] 36.3 °C (97.4 °F)  Pulse:  [78-90] 78  Resp:  [16-18] 16  BP: (125-148)/(53-80) 148/59  SpO2:  [90 %-93 %] 93 %    Physical Exam  Vitals and nursing note reviewed. Exam conducted with a chaperone present.   Constitutional:       General: He is not in acute distress.     Appearance: Normal appearance. He is not diaphoretic.      Comments: Unkempt  Speaking in full sentences with out difficulty   HENT:      Head:      Comments: Bruise to left forehead     Nose: Nose normal.      Mouth/Throat:      Mouth: Mucous membranes are moist.   Eyes:      Pupils: Pupils are equal, round, and reactive to light.   Cardiovascular:      Rate and Rhythm: Normal rate and regular rhythm.      Pulses: Normal pulses.      Heart sounds: Normal heart sounds.   Pulmonary:      Effort: Pulmonary effort is normal. No respiratory distress.      Breath sounds: No wheezing.      Comments: On room air   Chest:      Chest wall: Tenderness (to palpation, right chest, no s/o trauma) present.   Abdominal:      General: Abdomen is flat. Bowel sounds are normal.      Palpations: Abdomen is soft.   Musculoskeletal:         General: No swelling or deformity. Normal range of motion.   Skin:     General: Skin is warm and dry.      Capillary Refill: Capillary refill takes less than 2 seconds.      Findings: Bruising (right shin) present.   Neurological:      Mental Status: He is alert.      Cranial Nerves: No cranial nerve deficit.      Motor: Weakness (right sided hemiparesis, chronic per pt) present.      Comments: Oriented to person and place, not time or situation    Psychiatric:         Mood and Affect: Mood normal.          Behavior: Behavior normal.         Current Facility-Administered Medications:   •  ipratropium-albuterol (DUONEB) nebulizer solution, 3 mL, Nebulization, Q4H PRN (RT), Yaya Garcia M.D.  •  tiotropium (Spiriva Respimat) 2.5 mcg/Act inhalation spray 5 mcg, 5 mcg, Inhalation, QDAILY (RT), Yaya Garcia M.D., 5 mcg at 03/15/21 0823  •  hydrALAZINE (APRESOLINE) tablet 25 mg, 25 mg, Oral, Q8HRS, Wendi Isbell D.O., 25 mg at 03/15/21 1425  •  insulin regular (HumuLIN R,NovoLIN R) injection, 3-14 Units, Subcutaneous, 4X/DAY ACHS, 3 Units at 03/15/21 1154 **AND** POC Blood Glucose, , , Q AC AND BEDTIME(S) **AND** NOTIFY MD and PharmD, , , Once **AND** glucose 4 g chewable tablet 16 g, 16 g, Oral, Q15 MIN PRN **AND** dextrose 50% (D50W) injection 50 mL, 50 mL, Intravenous, Q15 MIN PRN, Elana Nguyen M.D.  •  omeprazole (PRILOSEC) capsule 40 mg, 40 mg, Oral, DAILY, Elana Nguyen M.D., 40 mg at 03/15/21 0559  •  lidocaine (LIDODERM) 5 % 1 Patch, 1 Patch, Transdermal, Q24HR, Elana Nguyen M.D., 1 Patch at 03/15/21 1154  •  oxyCODONE immediate-release (ROXICODONE) tablet 5 mg, 5 mg, Oral, Q4HRS PRN, Elana Nguyen M.D., 5 mg at 03/15/21 1219  •  labetalol (NORMODYNE/TRANDATE) injection 10-20 mg, 10-20 mg, Intravenous, Q6HRS PRN, Elana Nguyen M.D., 20 mg at 03/13/21 2205  •  aspirin EC (ECOTRIN) tablet 81 mg, 81 mg, Oral, DAILY, Sharmin Barkley M.D., 81 mg at 03/15/21 0559  •  Respiratory Therapy Consult, , Nebulization, Continuous RT, Sharmin Barkley M.D.  •  albuterol inhaler 2 Puff, 2 Puff, Inhalation, Q6HRS PRN, Sharmin Barkley M.D.  •  SITagliptin (JANUVIA) tablet 100 mg, 100 mg, Oral, DAILY, Sharmin Barkley M.D., 100 mg at 03/15/21 0559  •  amLODIPine (NORVASC) tablet 10 mg, 10 mg, Oral, DAILY, Sharmin Barkley M.D., 10 mg at 03/15/21 0559  •  atorvastatin (LIPITOR) tablet 40 mg, 40 mg, Oral, Nightly, Sharmin Barkley M.D., 40 mg at 03/14/21 2017  •  clopidogrel (PLAVIX) tablet 75 mg, 75 mg, Oral, DAILY, Sharmin Barkley M.D., 75 mg  at 03/15/21 0559  •  gabapentin (NEURONTIN) capsule 600 mg, 600 mg, Oral, QHS, Sharmin Barkley M.D., 600 mg at 03/14/21 2017  •  valsartan (DIOVAN) tablet 320 mg, 320 mg, Oral, QHS, Sharmin Barkley M.D., 320 mg at 03/14/21 2017  •  senna-docusate (PERICOLACE or SENOKOT S) 8.6-50 MG per tablet 2 tablet, 2 tablet, Oral, BID, 2 tablet at 03/15/21 0559 **AND** polyethylene glycol/lytes (MIRALAX) PACKET 1 Packet, 1 Packet, Oral, QDAY PRN **AND** magnesium hydroxide (MILK OF MAGNESIA) suspension 30 mL, 30 mL, Oral, QDAY PRN **AND** bisacodyl (DULCOLAX) suppository 10 mg, 10 mg, Rectal, QDAY PRN, Sharmin Barkley M.D.  •  enoxaparin (LOVENOX) inj 40 mg, 40 mg, Subcutaneous, DAILY, Sharmin Barkley M.D., 40 mg at 03/15/21 0559  •  acetaminophen (Tylenol) tablet 650 mg, 650 mg, Oral, Q6HRS PRN, Sharmin Barkley M.D.  •  ondansetron (ZOFRAN) syringe/vial injection 4 mg, 4 mg, Intravenous, Q4HRS PRN, Sharmin Barkley M.D.  •  ondansetron (ZOFRAN ODT) dispertab 4 mg, 4 mg, Oral, Q4HRS PRN, Sharmin Barkley M.D.      Fluids    Intake/Output Summary (Last 24 hours) at 3/15/2021 1552  Last data filed at 3/15/2021 0600  Gross per 24 hour   Intake --   Output 1300 ml   Net -1300 ml       Laboratory  Recent Labs     03/13/21 0102   WBC 15.7*   RBC 5.45   HEMOGLOBIN 16.5   HEMATOCRIT 48.7   MCV 89.4   MCH 30.3   MCHC 33.9   RDW 45.4   PLATELETCT 293   MPV 8.6*     Recent Labs     03/13/21 0102   SODIUM 135   POTASSIUM 3.5*   CHLORIDE 103   CO2 25   GLUCOSE 167*   BUN 21   CREATININE 0.84   CALCIUM 8.8                   Imaging  EC-ECHOCARDIOGRAM COMPLETE W/O CONT   Final Result      CT-CHEST,ABDOMEN,PELVIS WITH   Final Result      1.  No acute abnormality within the chest, abdomen, or pelvis      2.  Hepatic steatosis      3.  Aortic and branch vessel atherosclerotic plaque      4.  Incidental bilateral renal cyst      No follow up imaging is recommended per consensus guidelines of the 2019 ACR Incidental Findings Committee for probably benign  incidental simple appearing renal cystic lesion(s) based on imaging criteria.         CT-TSPINE W/O PLUS RECONS   Final Result      Mild wedge deformities of T12 and T2 have a chronic appearance.      Multilevel degenerative changes as above described.         CT-LSPINE W/O PLUS RECONS   Final Result      Mild wedge deformity of T12 has a chronic appearance.      Degenerative changes as above described, including facet arthropathy.      Dorsal displacement of the coccyx which is of indeterminate age and may be chronic.      Atherosclerotic plaque.      CT-HEAD W/O   Final Result      No acute intracranial abnormality is identified.      There are periventricular and subcortical white matter changes present.  This finding is nonspecific and could be from previous small vessel ischemia, demyelination, or gliosis.      Hypodensity in the jaxon likely related to a prior lacunar infarct.      Encephalomalacia in the left occipital lobe is likely related to a prior infarct.      Left frontal scalp hematoma.      CT-CSPINE WITHOUT PLUS RECONS   Final Result      Multilevel degenerative changes as above described.      Minimal grade 1 anterolisthesis of C5 on C6.      Minimal loss of height of T2 has a chronic appearance.         DX-CHEST-LIMITED (1 VIEW)   Final Result      Mild pulmonary vascular congestion.      Atherosclerotic plaque.      Elevation of the right hemidiaphragm.              Assessment/Plan  * Acute exacerbation of chronic obstructive pulmonary disease (COPD) ([facility])  Assessment & Plan  Acute on chronic exacerbation  With suspected pneumonitis - doxy started  Not on o2 at baseline  Continue steroids and respiratory protocol    Discharge planning issues  Assessment & Plan  Patient oriented x2   OT- Not safe to DC home as patient is unable to complete ADLs  SLP cognitive eval notes patient needs 24/7 supervision.     CM to help find NOK     Essential hypertension  Assessment & Plan  Uncontrolled  Continue  current home medications  IV as needed medications have been ordered  Increased hydralazine     Weakness  Assessment & Plan  Acute on chronic  Wheelchair bound at baseline  Chronic r hemiparesis    Encephalopathy  Assessment & Plan  Baseline unknown  Unclear if he had a concussion  CT negative  [facility] records [facility]  Neuro checks  Seizure precautions  Following    SLP cognitive eval pending     DM (diabetes mellitus) ([facility])  Assessment & Plan  With hyperglycemia  a1c 7.8  Hold oral meds  SSI with hypoglycemic protocol  Diabetic diet    Closed head injury  Assessment & Plan  Evidence per [facility]  Found on ground with abrasion on head with stove in front of him dented  Suspect occurred during syncopal event  CT with no acute abnormalities    Leukocytosis  Assessment & Plan  Likely 2/2 steroids  Continue doxycycline   Continue to monitor    Syncope  Assessment & Plan  With LOC  Hypoxia likely contributed  Echo- EF 55%      Acute respiratory failure with hypoxia ([facility])  Assessment & Plan  On room air today, will monitor  resolved       VTE prophylaxis: lovenox

## 2021-03-15 NOTE — CARE PLAN
Problem: Communication  Goal: The ability to communicate needs accurately and effectively will improve  Outcome: PROGRESSING AS EXPECTED     Problem: Safety  Goal: Will remain free from injury  Outcome: PROGRESSING AS EXPECTED  Goal: Will remain free from falls  Outcome: PROGRESSING AS EXPECTED       Patient AxO x 3; disoriented to situation  Respirations normal and unlabored. VSS.  Patient turned q2, declines at times. Patient educated on importance of turning every 2 hours, still declining.   Patient has complained of pain in right rib cage and right lower leg. Right lower leg is very sensitive to touch. PRNs and heat packs given as appropriate.   Fall risk protocol in place. Bed locked and in lowest position. Non-skid socks and bed alarm on.  Rounded on hourly. Call light with in reach.

## 2021-03-15 NOTE — DISCHARGE PLANNING
Agency/Facility Name: [facility]  Spoke To: Brandon  Outcome: Declined due to smoking.    Agency/Facility Name: [facility]  Spoke To: Mango  Outcome: Need discharge plan and updated PT note.

## 2021-03-16 ENCOUNTER — APPOINTMENT (OUTPATIENT)
Dept: RADIOLOGY | Facility: MEDICAL CENTER | Age: 66
DRG: 190 | End: 2021-03-16
Attending: INTERNAL MEDICINE
Payer: MEDICARE

## 2021-03-16 LAB
GLUCOSE BLD-MCNC: 159 MG/DL (ref 65–99)
GLUCOSE BLD-MCNC: 173 MG/DL (ref 65–99)
GLUCOSE BLD-MCNC: 197 MG/DL (ref 65–99)
GLUCOSE BLD-MCNC: 199 MG/DL (ref 65–99)

## 2021-03-16 PROCEDURE — 76882 US LMTD JT/FCL EVL NVASC XTR: CPT | Mod: RT

## 2021-03-16 PROCEDURE — 700101 HCHG RX REV CODE 250: Performed by: HOSPITALIST

## 2021-03-16 PROCEDURE — 92507 TX SP LANG VOICE COMM INDIV: CPT

## 2021-03-16 PROCEDURE — 82962 GLUCOSE BLOOD TEST: CPT | Mod: 91

## 2021-03-16 PROCEDURE — A9270 NON-COVERED ITEM OR SERVICE: HCPCS | Performed by: HOSPITALIST

## 2021-03-16 PROCEDURE — 700102 HCHG RX REV CODE 250 W/ 637 OVERRIDE(OP): Performed by: INTERNAL MEDICINE

## 2021-03-16 PROCEDURE — 700102 HCHG RX REV CODE 250 W/ 637 OVERRIDE(OP): Performed by: HOSPITALIST

## 2021-03-16 PROCEDURE — A9270 NON-COVERED ITEM OR SERVICE: HCPCS | Performed by: INTERNAL MEDICINE

## 2021-03-16 PROCEDURE — 770006 HCHG ROOM/CARE - MED/SURG/GYN SEMI*

## 2021-03-16 PROCEDURE — 700111 HCHG RX REV CODE 636 W/ 250 OVERRIDE (IP): Performed by: HOSPITALIST

## 2021-03-16 PROCEDURE — 99233 SBSQ HOSP IP/OBS HIGH 50: CPT | Performed by: INTERNAL MEDICINE

## 2021-03-16 RX ADMIN — INSULIN HUMAN 3 UNITS: 100 INJECTION, SOLUTION PARENTERAL at 20:31

## 2021-03-16 RX ADMIN — HYDRALAZINE HYDROCHLORIDE 25 MG: 25 TABLET, FILM COATED ORAL at 05:53

## 2021-03-16 RX ADMIN — ATORVASTATIN CALCIUM 40 MG: 40 TABLET, FILM COATED ORAL at 20:31

## 2021-03-16 RX ADMIN — OMEPRAZOLE 40 MG: 20 CAPSULE, DELAYED RELEASE ORAL at 05:52

## 2021-03-16 RX ADMIN — ASPIRIN 81 MG: 81 TABLET, COATED ORAL at 05:53

## 2021-03-16 RX ADMIN — INSULIN HUMAN 3 UNITS: 100 INJECTION, SOLUTION PARENTERAL at 11:48

## 2021-03-16 RX ADMIN — GABAPENTIN 600 MG: 300 CAPSULE ORAL at 20:31

## 2021-03-16 RX ADMIN — VALSARTAN 320 MG: 80 TABLET, FILM COATED ORAL at 20:31

## 2021-03-16 RX ADMIN — ENOXAPARIN SODIUM 40 MG: 40 INJECTION SUBCUTANEOUS at 05:52

## 2021-03-16 RX ADMIN — OXYCODONE 5 MG: 5 TABLET ORAL at 16:24

## 2021-03-16 RX ADMIN — CLOPIDOGREL BISULFATE 75 MG: 75 TABLET ORAL at 05:53

## 2021-03-16 RX ADMIN — INSULIN HUMAN 3 UNITS: 100 INJECTION, SOLUTION PARENTERAL at 05:53

## 2021-03-16 RX ADMIN — HYDRALAZINE HYDROCHLORIDE 25 MG: 25 TABLET, FILM COATED ORAL at 12:53

## 2021-03-16 RX ADMIN — INSULIN HUMAN 3 UNITS: 100 INJECTION, SOLUTION PARENTERAL at 16:32

## 2021-03-16 RX ADMIN — SITAGLIPTIN 100 MG: 100 TABLET, FILM COATED ORAL at 05:53

## 2021-03-16 RX ADMIN — TIOTROPIUM BROMIDE INHALATION SPRAY 5 MCG: 3.12 SPRAY, METERED RESPIRATORY (INHALATION) at 08:06

## 2021-03-16 RX ADMIN — OXYCODONE 5 MG: 5 TABLET ORAL at 20:31

## 2021-03-16 RX ADMIN — METOPROLOL TARTRATE 25 MG: 25 TABLET, FILM COATED ORAL at 20:02

## 2021-03-16 RX ADMIN — AMLODIPINE BESYLATE 10 MG: 10 TABLET ORAL at 05:53

## 2021-03-16 RX ADMIN — OXYCODONE 5 MG: 5 TABLET ORAL at 06:24

## 2021-03-16 RX ADMIN — LIDOCAINE 1 PATCH: 50 PATCH TOPICAL at 11:48

## 2021-03-16 RX ADMIN — OXYCODONE 5 MG: 5 TABLET ORAL at 11:48

## 2021-03-16 RX ADMIN — DOCUSATE SODIUM 50 MG AND SENNOSIDES 8.6 MG 2 TABLET: 8.6; 5 TABLET, FILM COATED ORAL at 16:24

## 2021-03-16 ASSESSMENT — PAIN DESCRIPTION - PAIN TYPE
TYPE: ACUTE PAIN

## 2021-03-16 ASSESSMENT — ENCOUNTER SYMPTOMS
FOCAL WEAKNESS: 1
SHORTNESS OF BREATH: 0
MEMORY LOSS: 1
PALPITATIONS: 0
SORE THROAT: 0
FALLS: 1
FEVER: 0
DEPRESSION: 0
WEAKNESS: 0
COUGH: 1
VOMITING: 0
DIARRHEA: 0
NAUSEA: 0
CHILLS: 0
DIZZINESS: 0
ABDOMINAL PAIN: 0
BLURRED VISION: 0
CONSTIPATION: 0

## 2021-03-16 ASSESSMENT — PAIN SCALES - WONG BAKER: WONGBAKER_NUMERICALRESPONSE: HURTS JUST A LITTLE BIT

## 2021-03-16 NOTE — CARE PLAN
Patient use call light appropriately, pain management addressed with patient to be discussed with physician.  Problem: Safety  Goal: Will remain free from injury  Outcome: PROGRESSING AS EXPECTED     Problem: Pain Management  Goal: Pain level will decrease to patient's comfort goal  Outcome: PROGRESSING SLOWER THAN EXPECTED

## 2021-03-16 NOTE — CARE PLAN
Problem: Psychosocial Needs:  Goal: Level of anxiety will decrease  Outcome: PROGRESSING AS EXPECTED  Note: Pt is slightly confused. RN reoriented pt to situation. Pt is anxious about getting answers from doctors and is agreeable to waiting until day shift arrives.        Problem: Pain Management  Goal: Pain level will decrease to patient's comfort goal  Outcome: PROGRESSING AS EXPECTED  Note: Pt reporting 8/10 pain, medicated per MAR. Upon reassessment, pt is resting at a pain level of 2/10.

## 2021-03-16 NOTE — PROGRESS NOTES
Assumed care at 1900. Received report from day shift RN. Pt is awake in bed, A&Ox 3, disoriented to time, on RA, reports a pain level of 8/10. Fall precautions and appropriate signs in place. Call light and belongings within reach. Bed alarm and hourly rounding in place. Communication board updated. Pt denies any additional needs at this time.

## 2021-03-16 NOTE — PROGRESS NOTES
Assumed care at 0700 following night nurse report, assessment completed. Patient is A&O X4, some memory loss regarding how he fell at home. Patient complains of right rib pain medication not given at this time. Fall precautions and appropriate signs in place. Call light/phone system and RN extension updated on whiteboard. Bed alarm is in use, patient is 2 person assist with standing, one person assist with bed mobility. Patient denies any additional needs at this time, Hourly rounding place.

## 2021-03-16 NOTE — PROGRESS NOTES
[facility] Medicine Daily Progress Note    Date of Service  3/16/2021    Chief Complaint  66 y.o. male admitted 3/5/2021 with past medical history of stroke with right-sided hemiplegia, diabetes, COPD who presents to the ER with a syncope.    Hospital Course  Mr. Clint Duarte is a 66 y.o. male with a history of stroke with right-sided hemiplegia, diabetes, COPD not on baseline oxygen who presented on 3/5/2021 with syncope.  Patient hit his head on his stove.  Had likely been down for several hours.  On presentation he was found to be hypoxic and treated for COPD exacerbation.  CT head, chest abdomen pelvis showed no acute abnormalities.  CT spine showed chronic compression fractures.  Echocardiogram showed ejection fraction 55%.    Interval Problem Update  Patient was seen and examined at bedside.  I have personally reviewed and interpreted vitals, labs, and imaging.  3/16.  Afebrile.  Stable vitals.  On room air.  Titrate blood pressure regimen.  Patient alert and oriented x3 but cannot member why he came to the hospital.  Denies fevers, chills, chest pains, shortness of breath.  He does report rib pain right-sided when he takes deep breath.  Had cognitive evaluation by speech therapy who recommended home health.    Consultants/Specialty  None    Code Status  DNAR/DNI    Disposition  Will need 24/7 supervision per SLP upon discharge.  This will require SNF placement as this cannot be achieved in the community with his current supports.  [facility]    Review of Systems  Review of Systems   Constitutional: Negative for chills and fever.   HENT: Negative for congestion and sore throat.    Eyes: Negative for blurred vision.   Respiratory: Positive for cough. Negative for shortness of breath.    Cardiovascular: Positive for chest pain (right sided chest wall). Negative for palpitations and leg swelling.   Gastrointestinal: Negative for abdominal pain, constipation, diarrhea, nausea and vomiting.   Genitourinary:  Negative for dysuria.   Musculoskeletal: Positive for falls (Fall and loss of consciousness prior to admission).   Skin: Negative for rash.   Neurological: Positive for focal weakness (r sided, per pt stable x 10 years). Negative for dizziness and weakness.   Psychiatric/Behavioral: Positive for memory loss. Negative for depression.   All other systems reviewed and are negative.       Physical Exam  Temp:  [36.3 °C (97.4 °F)-37.4 °C (99.4 °F)] 36.5 °C (97.7 °F)  Pulse:  [78-93] 93  Resp:  [16-17] 17  BP: (131-148)/(56-77) 140/56  SpO2:  [90 %-93 %] 91 %    Physical Exam  Vitals and nursing note reviewed.   Constitutional:       General: He is not in acute distress.     Appearance: Normal appearance. He is not diaphoretic.      Comments: Unkempt  Speaking in full sentences with out difficulty   HENT:      Head: Normocephalic.      Comments: Bruise to left forehead     Right Ear: External ear normal.      Left Ear: External ear normal.      Nose: Nose normal.      Mouth/Throat:      Mouth: Mucous membranes are moist.      Pharynx: Oropharynx is clear.   Eyes:      Extraocular Movements: Extraocular movements intact.      Conjunctiva/sclera: Conjunctivae normal.   Cardiovascular:      Rate and Rhythm: Normal rate and regular rhythm.      Pulses: Normal pulses.      Heart sounds: Normal heart sounds.   Pulmonary:      Effort: Pulmonary effort is normal. No respiratory distress.      Breath sounds: No wheezing or rales.   Chest:      Chest wall: Tenderness (to palpation, right chest, no s/o trauma) present.   Abdominal:      General: Abdomen is flat. Bowel sounds are normal. There is no distension.      Palpations: Abdomen is soft.      Tenderness: There is no abdominal tenderness. There is no guarding.   Musculoskeletal:         General: Normal range of motion.      Cervical back: Normal range of motion.      Comments: Hematoma versus abscess of right shin   Skin:     General: Skin is warm and dry.      Capillary  Refill: Capillary refill takes less than 2 seconds.   Neurological:      General: No focal deficit present.      Mental Status: He is alert and oriented to person, place, and time. Mental status is at baseline.      Cranial Nerves: No cranial nerve deficit.      Motor: Weakness (right sided hemiparesis, chronic per pt) present.   Psychiatric:         Mood and Affect: Mood normal.         Behavior: Behavior normal.         Current Facility-Administered Medications:   •  ipratropium-albuterol (DUONEB) nebulizer solution, 3 mL, Nebulization, Q4H PRN (RT), Yaya Garcia M.D.  •  tiotropium (Spiriva Respimat) 2.5 mcg/Act inhalation spray 5 mcg, 5 mcg, Inhalation, QDAILY (RT), Yaya Garcia M.D., 5 mcg at 03/16/21 0806  •  hydrALAZINE (APRESOLINE) tablet 25 mg, 25 mg, Oral, Q8HRS, Wendi Isblel D.O., 25 mg at 03/16/21 0553  •  insulin regular (HumuLIN R,NovoLIN R) injection, 3-14 Units, Subcutaneous, 4X/DAY ACHS, 3 Units at 03/16/21 0553 **AND** POC Blood Glucose, , , Q AC AND BEDTIME(S) **AND** NOTIFY MD and PharmD, , , Once **AND** glucose 4 g chewable tablet 16 g, 16 g, Oral, Q15 MIN PRN **AND** dextrose 50% (D50W) injection 50 mL, 50 mL, Intravenous, Q15 MIN PRN, Elana Nguyen M.D.  •  omeprazole (PRILOSEC) capsule 40 mg, 40 mg, Oral, DAILY, Elana Nguyen M.D., 40 mg at 03/16/21 0552  •  lidocaine (LIDODERM) 5 % 1 Patch, 1 Patch, Transdermal, Q24HR, Elana Nguyen M.D., 1 Patch at 03/15/21 1154  •  oxyCODONE immediate-release (ROXICODONE) tablet 5 mg, 5 mg, Oral, Q4HRS PRN, Elana Nguyen M.D., 5 mg at 03/16/21 0624  •  labetalol (NORMODYNE/TRANDATE) injection 10-20 mg, 10-20 mg, Intravenous, Q6HRS PRN, Elana Nguyen M.D., 20 mg at 03/13/21 2205  •  aspirin EC (ECOTRIN) tablet 81 mg, 81 mg, Oral, DAILY, Sharmin Barkley M.D., 81 mg at 03/16/21 0569  •  Respiratory Therapy Consult, , Nebulization, Continuous RT, Sharmin Barkley M.D.  •  albuterol inhaler 2 Puff, 2 Puff, Inhalation, Q6HRS PRN, Sharmin Barkley M.D.  •   SITagliptin (JANUVIA) tablet 100 mg, 100 mg, Oral, DAILY, Sharmin Barkley M.D., 100 mg at 03/16/21 0553  •  amLODIPine (NORVASC) tablet 10 mg, 10 mg, Oral, DAILY, Sharmin Barkley M.D., 10 mg at 03/16/21 0553  •  atorvastatin (LIPITOR) tablet 40 mg, 40 mg, Oral, Nightly, Sharmin Barkley M.D., 40 mg at 03/15/21 2128  •  clopidogrel (PLAVIX) tablet 75 mg, 75 mg, Oral, DAILY, Sharmin Barkley M.D., 75 mg at 03/16/21 0553  •  gabapentin (NEURONTIN) capsule 600 mg, 600 mg, Oral, QHS, Sharmin Barkley M.D., 600 mg at 03/15/21 2128  •  valsartan (DIOVAN) tablet 320 mg, 320 mg, Oral, QHS, Sharmin Barkley M.D., 320 mg at 03/15/21 2128  •  senna-docusate (PERICOLACE or SENOKOT S) 8.6-50 MG per tablet 2 tablet, 2 tablet, Oral, BID, 2 tablet at 03/15/21 1705 **AND** polyethylene glycol/lytes (MIRALAX) PACKET 1 Packet, 1 Packet, Oral, QDAY PRN **AND** magnesium hydroxide (MILK OF MAGNESIA) suspension 30 mL, 30 mL, Oral, QDAY PRN **AND** bisacodyl (DULCOLAX) suppository 10 mg, 10 mg, Rectal, QDAY PRN, Sharmin Barkley M.D.  •  enoxaparin (LOVENOX) inj 40 mg, 40 mg, Subcutaneous, DAILY, Sharmin Barkley M.D., 40 mg at 03/16/21 0552  •  acetaminophen (Tylenol) tablet 650 mg, 650 mg, Oral, Q6HRS PRN, Sharmin Barkley M.D.  •  ondansetron (ZOFRAN) syringe/vial injection 4 mg, 4 mg, Intravenous, Q4HRS PRN, Sharmin Barkley M.D.  •  ondansetron (ZOFRAN ODT) dispertab 4 mg, 4 mg, Oral, Q4HRS PRN, Sharmin Barkley M.D.      Fluids    Intake/Output Summary (Last 24 hours) at 3/16/2021 0892  Last data filed at 3/16/2021 0436  Gross per 24 hour   Intake --   Output 400 ml   Net -400 ml       Laboratory                        Imaging  EC-ECHOCARDIOGRAM COMPLETE W/O CONT   Final Result      CT-CHEST,ABDOMEN,PELVIS WITH   Final Result      1.  No acute abnormality within the chest, abdomen, or pelvis      2.  Hepatic steatosis      3.  Aortic and branch vessel atherosclerotic plaque      4.  Incidental bilateral renal cyst      No follow up imaging is recommended per  consensus guidelines of the 2019 ACR Incidental Findings Committee for probably benign incidental simple appearing renal cystic lesion(s) based on imaging criteria.         CT-TSPINE W/O PLUS RECONS   Final Result      Mild wedge deformities of T12 and T2 have a chronic appearance.      Multilevel degenerative changes as above described.         CT-LSPINE W/O PLUS RECONS   Final Result      Mild wedge deformity of T12 has a chronic appearance.      Degenerative changes as above described, including facet arthropathy.      Dorsal displacement of the coccyx which is of indeterminate age and may be chronic.      Atherosclerotic plaque.      CT-HEAD W/O   Final Result      No acute intracranial abnormality is identified.      There are periventricular and subcortical white matter changes present.  This finding is nonspecific and could be from previous small vessel ischemia, demyelination, or gliosis.      Hypodensity in the jaxon likely related to a prior lacunar infarct.      Encephalomalacia in the left occipital lobe is likely related to a prior infarct.      Left frontal scalp hematoma.      CT-CSPINE WITHOUT PLUS RECONS   Final Result      Multilevel degenerative changes as above described.      Minimal grade 1 anterolisthesis of C5 on C6.      Minimal loss of height of T2 has a chronic appearance.         DX-CHEST-LIMITED (1 VIEW)   Final Result      Mild pulmonary vascular congestion.      Atherosclerotic plaque.      Elevation of the right hemidiaphragm.              Assessment/Plan  * Acute exacerbation of chronic obstructive pulmonary disease (COPD) ([facility])  Assessment & Plan  Acute on chronic exacerbation  With suspected pneumonitis -completed antibiotics  Not on o2 at baseline    Acute exacerbation resolved.  RT protocol    Discharge planning issues  Assessment & Plan  Patient oriented x2   OT- Not safe to DC home as patient is unable to complete ADLs  SLP cognitive eval notes patient needs 24/7 supervision.   No recommends home health.  CM to help find NOK     Essential hypertension  Assessment & Plan  Uncontrolled  Continue current home medications losartan, metoprolol, amlodipine  IV as needed medications have been ordered    Weakness  Assessment & Plan  Acute on chronic  Wheelchair bound at baseline  Chronic r hemiparesis    Encephalopathy  Assessment & Plan  Baseline unknown  Unclear if he had a concussion  CT head negative  [facility]  Neuro checks  Seizure precautions    SLP cognitive eval now recommends home health    DM (diabetes mellitus) (HCC)  Assessment & Plan  Lab Results   Component Value Date/Time    HBA1C 7.8 (H) 03/05/2021 2055     Results from last 7 days   Lab Units 03/16/21  1631 03/16/21  1152 03/16/21  0551 03/15/21  2127 03/15/21  1704 03/15/21  1128 03/15/21  0604 03/14/21 2020   ACCU CHECK GLUCOSE 788 mg/dL 199* 173* 159* 174* 173* 177* 152* 187*     I have ordered insulin sliding scale with D50 and glucagon for hypoglycemia per protocol.  Diabetic diet  Diabetic education    Closed head injury  Assessment & Plan  Evidence per [facility]  Found on ground with abrasion on head with stove in front of him dented  Suspect occurred during syncopal event  CT with no acute abnormalities    Leukocytosis  Assessment & Plan  Likely 2/2 steroids completed 3/10  Completed doxycycline   Continue to monitor  Negative procalcitonin    Syncope  Assessment & Plan  With LOC  Hypoxia likely contributed  Echo- EF 55%    Acute respiratory failure with hypoxia (HCC)  Assessment & Plan  On room air today, will monitor  resolved       VTE prophylaxis: lovenox

## 2021-03-16 NOTE — THERAPY
Speech Language Pathology  Daily Treatment     Patient Name: Clint Duarte  Age:  66 y.o., Sex:  male  Medical Record #: [id number]  Today's Date: 3/16/2021     Precautions  Precautions: Fall Risk  Comments: encephalopathy and closed head injury (found on ground)    Assessment  Patient seen this date for cognitive-linguistic tx session. Patient awake, alert, and oriented, with the exception of day of the month (15th instead of 16th) and reason for admit (another stroke). Patient was provided reassessment and tx in the areas of generative naming, short-term memory, problem-solving, and reading comprehension. Patient presented with improvement in the areas of short-term memory and problem-solving. Problem-solving was improved independently, but patient performed WNL and recalled 4/4 words after 10 minutes when provided with visual imagery and repetition strategies for short-term memory. Patient presented with minimal deficits in generative naming in 2 opportunities despite use of trained strategies. Moderate deficits were still noted in reading comprehension at the paragraph level. Patient continues to demonstrate adequate understanding of the further assistance he needs at home, insight into deficits, and safety awareness.    SLP will follow during acute stay, but patient would benefit from  SLP services to address deficits, as well as intermittent daily supervision upon d/c. Please refer to PT/OT recs for d/c planning, as home safety is a concern.       Plan  Continue current treatment plan.    Discharge Recommendations: Recommend home health for continued speech therapy services     Objective     03/16/21 1048   Precautions   Precautions Fall Risk   Vitals   O2 Delivery Device None - Room Air   Verbal Expression   Dysarthria Minimal (4)   Word Finding Deficits Minimal (4)  (For generative naming )   Auditory Comprehension   Understands Paragraph Moderate (3)   Cognitive-Linguistic   Level of Consciousness Alert    Orientation Level Not Oriented to Reason   Short Term Memory Supervision (5)  (WNL w/ cues to strategies )   Simple Reasoning / Problem Solving Supervision (5)  (w/ cues )   Short Term Goals   Short Term Goal # 1 Patient will be oriented in all spheres with minimal verbal cues from SLP.    Goal Outcome # 1 Goal met   Short Term Goal # 2 Patient will complete generative naming tasks in 2/3 attempts with minimal verbal cues.    Goal Outcome # 2  Progressing as expected   Short Term Goal # 3 Patient will recall 3/4 items from memory after 10 minutes with moderate verbal cues from SLP.    Goal Outcome  # 3 Goal met   Anticipated Discharge Needs   Discharge Recommendations Recommend home health for continued speech therapy services

## 2021-03-17 ENCOUNTER — APPOINTMENT (OUTPATIENT)
Dept: RADIOLOGY | Facility: MEDICAL CENTER | Age: 66
DRG: 190 | End: 2021-03-17
Attending: INTERNAL MEDICINE
Payer: MEDICARE

## 2021-03-17 LAB
ANION GAP SERPL CALC-SCNC: 6 MMOL/L (ref 7–16)
BASOPHILS # BLD AUTO: 0.4 % (ref 0–1.8)
BASOPHILS # BLD: 0.05 K/UL (ref 0–0.12)
BUN SERPL-MCNC: 13 MG/DL (ref 8–22)
CALCIUM SERPL-MCNC: 8.5 MG/DL (ref 8.5–10.5)
CHLORIDE SERPL-SCNC: 103 MMOL/L (ref 96–112)
CO2 SERPL-SCNC: 26 MMOL/L (ref 20–33)
CREAT SERPL-MCNC: 0.75 MG/DL (ref 0.5–1.4)
EOSINOPHIL # BLD AUTO: 0.18 K/UL (ref 0–0.51)
EOSINOPHIL NFR BLD: 1.5 % (ref 0–6.9)
ERYTHROCYTE [DISTWIDTH] IN BLOOD BY AUTOMATED COUNT: 44 FL (ref 35.9–50)
GLUCOSE BLD-MCNC: 131 MG/DL (ref 65–99)
GLUCOSE BLD-MCNC: 163 MG/DL (ref 65–99)
GLUCOSE BLD-MCNC: 188 MG/DL (ref 65–99)
GLUCOSE BLD-MCNC: 189 MG/DL (ref 65–99)
GLUCOSE SERPL-MCNC: 159 MG/DL (ref 65–99)
HCT VFR BLD AUTO: 42.1 % (ref 42–52)
HGB BLD-MCNC: 14 G/DL (ref 14–18)
IMM GRANULOCYTES # BLD AUTO: 0.07 K/UL (ref 0–0.11)
IMM GRANULOCYTES NFR BLD AUTO: 0.6 % (ref 0–0.9)
LYMPHOCYTES # BLD AUTO: 2.53 K/UL (ref 1–4.8)
LYMPHOCYTES NFR BLD: 21.4 % (ref 22–41)
MAGNESIUM SERPL-MCNC: 2 MG/DL (ref 1.5–2.5)
MCH RBC QN AUTO: 29.7 PG (ref 27–33)
MCHC RBC AUTO-ENTMCNC: 33.3 G/DL (ref 33.7–35.3)
MCV RBC AUTO: 89.2 FL (ref 81.4–97.8)
MONOCYTES # BLD AUTO: 1.4 K/UL (ref 0–0.85)
MONOCYTES NFR BLD AUTO: 11.9 % (ref 0–13.4)
NEUTROPHILS # BLD AUTO: 7.57 K/UL (ref 1.82–7.42)
NEUTROPHILS NFR BLD: 64.2 % (ref 44–72)
NRBC # BLD AUTO: 0 K/UL
NRBC BLD-RTO: 0 /100 WBC
PHOSPHATE SERPL-MCNC: 3 MG/DL (ref 2.5–4.5)
PLATELET # BLD AUTO: 276 K/UL (ref 164–446)
PMV BLD AUTO: 9.1 FL (ref 9–12.9)
POTASSIUM SERPL-SCNC: 3.8 MMOL/L (ref 3.6–5.5)
RBC # BLD AUTO: 4.72 M/UL (ref 4.7–6.1)
SODIUM SERPL-SCNC: 135 MMOL/L (ref 135–145)
WBC # BLD AUTO: 11.8 K/UL (ref 4.8–10.8)

## 2021-03-17 PROCEDURE — 700101 HCHG RX REV CODE 250: Performed by: HOSPITALIST

## 2021-03-17 PROCEDURE — 770006 HCHG ROOM/CARE - MED/SURG/GYN SEMI*

## 2021-03-17 PROCEDURE — 84100 ASSAY OF PHOSPHORUS: CPT

## 2021-03-17 PROCEDURE — 73590 X-RAY EXAM OF LOWER LEG: CPT | Mod: RT

## 2021-03-17 PROCEDURE — 700102 HCHG RX REV CODE 250 W/ 637 OVERRIDE(OP): Performed by: INTERNAL MEDICINE

## 2021-03-17 PROCEDURE — 36415 COLL VENOUS BLD VENIPUNCTURE: CPT

## 2021-03-17 PROCEDURE — A9270 NON-COVERED ITEM OR SERVICE: HCPCS | Performed by: INTERNAL MEDICINE

## 2021-03-17 PROCEDURE — 700111 HCHG RX REV CODE 636 W/ 250 OVERRIDE (IP): Performed by: HOSPITALIST

## 2021-03-17 PROCEDURE — 85025 COMPLETE CBC W/AUTO DIFF WBC: CPT

## 2021-03-17 PROCEDURE — A9270 NON-COVERED ITEM OR SERVICE: HCPCS | Performed by: HOSPITALIST

## 2021-03-17 PROCEDURE — 80048 BASIC METABOLIC PNL TOTAL CA: CPT

## 2021-03-17 PROCEDURE — 97530 THERAPEUTIC ACTIVITIES: CPT

## 2021-03-17 PROCEDURE — 700102 HCHG RX REV CODE 250 W/ 637 OVERRIDE(OP): Performed by: HOSPITALIST

## 2021-03-17 PROCEDURE — 83735 ASSAY OF MAGNESIUM: CPT

## 2021-03-17 PROCEDURE — 99232 SBSQ HOSP IP/OBS MODERATE 35: CPT | Performed by: INTERNAL MEDICINE

## 2021-03-17 PROCEDURE — 82962 GLUCOSE BLOOD TEST: CPT | Mod: 91

## 2021-03-17 RX ORDER — POTASSIUM CHLORIDE 20 MEQ/1
20 TABLET, EXTENDED RELEASE ORAL ONCE
Status: COMPLETED | OUTPATIENT
Start: 2021-03-17 | End: 2021-03-17

## 2021-03-17 RX ADMIN — ASPIRIN 81 MG: 81 TABLET, COATED ORAL at 05:48

## 2021-03-17 RX ADMIN — INSULIN HUMAN 3 UNITS: 100 INJECTION, SOLUTION PARENTERAL at 05:48

## 2021-03-17 RX ADMIN — LIDOCAINE 1 PATCH: 50 PATCH TOPICAL at 11:41

## 2021-03-17 RX ADMIN — POTASSIUM CHLORIDE 20 MEQ: 1500 TABLET, EXTENDED RELEASE ORAL at 09:30

## 2021-03-17 RX ADMIN — CLOPIDOGREL BISULFATE 75 MG: 75 TABLET ORAL at 05:48

## 2021-03-17 RX ADMIN — OXYCODONE 5 MG: 5 TABLET ORAL at 09:30

## 2021-03-17 RX ADMIN — ENOXAPARIN SODIUM 40 MG: 40 INJECTION SUBCUTANEOUS at 05:48

## 2021-03-17 RX ADMIN — VALSARTAN 320 MG: 80 TABLET, FILM COATED ORAL at 20:39

## 2021-03-17 RX ADMIN — OMEPRAZOLE 40 MG: 20 CAPSULE, DELAYED RELEASE ORAL at 05:48

## 2021-03-17 RX ADMIN — SITAGLIPTIN 100 MG: 100 TABLET, FILM COATED ORAL at 06:30

## 2021-03-17 RX ADMIN — INSULIN HUMAN 3 UNITS: 100 INJECTION, SOLUTION PARENTERAL at 11:41

## 2021-03-17 RX ADMIN — OXYCODONE 5 MG: 5 TABLET ORAL at 14:43

## 2021-03-17 RX ADMIN — AMLODIPINE BESYLATE 10 MG: 10 TABLET ORAL at 05:48

## 2021-03-17 RX ADMIN — ATORVASTATIN CALCIUM 40 MG: 40 TABLET, FILM COATED ORAL at 20:34

## 2021-03-17 RX ADMIN — TIOTROPIUM BROMIDE INHALATION SPRAY 5 MCG: 3.12 SPRAY, METERED RESPIRATORY (INHALATION) at 07:37

## 2021-03-17 RX ADMIN — METOPROLOL TARTRATE 25 MG: 25 TABLET, FILM COATED ORAL at 17:10

## 2021-03-17 RX ADMIN — OXYCODONE 5 MG: 5 TABLET ORAL at 20:34

## 2021-03-17 RX ADMIN — OXYCODONE 5 MG: 5 TABLET ORAL at 04:43

## 2021-03-17 RX ADMIN — DOCUSATE SODIUM 50 MG AND SENNOSIDES 8.6 MG 2 TABLET: 8.6; 5 TABLET, FILM COATED ORAL at 17:10

## 2021-03-17 RX ADMIN — METOPROLOL TARTRATE 25 MG: 25 TABLET, FILM COATED ORAL at 05:48

## 2021-03-17 RX ADMIN — GABAPENTIN 600 MG: 300 CAPSULE ORAL at 20:34

## 2021-03-17 RX ADMIN — INSULIN HUMAN 3 UNITS: 100 INJECTION, SOLUTION PARENTERAL at 20:25

## 2021-03-17 ASSESSMENT — PAIN DESCRIPTION - PAIN TYPE
TYPE: ACUTE PAIN

## 2021-03-17 ASSESSMENT — ENCOUNTER SYMPTOMS
SHORTNESS OF BREATH: 0
WEAKNESS: 0
NAUSEA: 0
CHILLS: 0
MEMORY LOSS: 1
VOMITING: 0
FALLS: 1
COUGH: 1
CONSTIPATION: 0
FEVER: 0
PALPITATIONS: 0
DIZZINESS: 0
BLURRED VISION: 0
DEPRESSION: 0
DIARRHEA: 0
ABDOMINAL PAIN: 0
FOCAL WEAKNESS: 1
SORE THROAT: 0

## 2021-03-17 ASSESSMENT — PAIN SCALES - WONG BAKER: WONGBAKER_NUMERICALRESPONSE: HURTS JUST A LITTLE BIT

## 2021-03-17 ASSESSMENT — COGNITIVE AND FUNCTIONAL STATUS - GENERAL
DAILY ACTIVITIY SCORE: 20
TOILETING: A LITTLE
DRESSING REGULAR LOWER BODY CLOTHING: A LITTLE
HELP NEEDED FOR BATHING: A LITTLE
SUGGESTED CMS G CODE MODIFIER DAILY ACTIVITY: CJ
DRESSING REGULAR UPPER BODY CLOTHING: A LITTLE

## 2021-03-17 NOTE — CARE PLAN
Patient's SpO2 86-92% RA, Patient educated on incentive spirometry and requested to attempt to utilize every commercial break, patient stated understanding and compliance to request.  Problem: Communication  Goal: The ability to communicate needs accurately and effectively will improve  Outcome: PROGRESSING AS EXPECTED     Problem: Respiratory:  Goal: Respiratory status will improve  Outcome: PROGRESSING SLOWER THAN EXPECTED

## 2021-03-17 NOTE — DISCHARGE PLANNING
Anticipated Discharge Disposition: SNF    Action: LSW reviewed Pt's SNF referrals. PT has been declined by [facility] and [facility]. [facility] is still pending. LSW requested [facility] f/u on [facility] referral and [facility] reported that [facility] is requesting a more recent PT note.      Barriers to Discharge: SNF acceptance    Plan: LSW to f/u with BS RN and MD to find out if PT can see Pt again.

## 2021-03-17 NOTE — PROGRESS NOTES
[facility] Medicine Daily Progress Note    Date of Service  3/17/2021    Chief Complaint  66 y.o. male admitted 3/5/2021 with past medical history of stroke with right-sided hemiplegia, diabetes, COPD who presents to the ER with a syncope.    Hospital Course  Mr. Clint Duarte is a 66 y.o. male with a history of stroke with right-sided hemiplegia, diabetes, COPD not on baseline oxygen who presented on 3/5/2021 with syncope.  Patient hit his head on his stove.  Had likely been down for several hours.  On presentation he was found to be hypoxic and treated for COPD exacerbation.  CT head, chest abdomen pelvis showed no acute abnormalities.  CT spine showed chronic compression fractures.  Echocardiogram showed ejection fraction 55%.    Interval Problem Update  Patient was seen and examined at bedside.  I have personally reviewed and interpreted vitals, labs, and imaging.  3/16.  Afebrile.  Stable vitals.  On room air.  Titrate blood pressure regimen.  Patient alert and oriented x3 but cannot member why he came to the hospital.  Denies fevers, chills, chest pains, shortness of breath.  He does report rib pain right-sided when he takes deep breath.  Had cognitive evaluation by speech therapy who recommended home health.    3/17.  Afebrile.  Stable vitals.  On room air.  Replete potassium.  Denies fever, chills, chest pain, shortness of breath.  Reports persistent right rib pain and right shin pain.  Xray shows no fracture of tib/fib.      Consultants/Specialty  None    Code Status  DNAR/DNI    Disposition  Has improved cognitively to where SLP now recommends home health rather than 24/7 supervision.  [facility]  Lives by himself with no friends or family prior to admission    Review of Systems  Review of Systems   Constitutional: Negative for chills and fever.   HENT: Negative for congestion and sore throat.    Eyes: Negative for blurred vision.   Respiratory: Positive for cough. Negative  for shortness of breath.    Cardiovascular: Positive for chest pain (right sided chest wall). Negative for palpitations and leg swelling.   Gastrointestinal: Negative for abdominal pain, constipation, diarrhea, nausea and vomiting.   Genitourinary: Negative for dysuria.   Musculoskeletal: Positive for falls (Fall and loss of consciousness prior to admission) and joint pain (Right shin).   Skin: Negative for rash.   Neurological: Positive for focal weakness (r sided, per pt stable x 10 years). Negative for dizziness and weakness.   Psychiatric/Behavioral: Positive for memory loss. Negative for depression.   All other systems reviewed and are negative.       Physical Exam  Temp:  [36.5 °C (97.7 °F)-36.9 °C (98.5 °F)] 36.9 °C (98.5 °F)  Pulse:  [77-91] 77  Resp:  [16] 16  BP: (110-134)/(53-64) 134/56  SpO2:  [92 %] 92 %    Physical Exam  Vitals and nursing note reviewed.   Constitutional:       General: He is not in acute distress.     Appearance: Normal appearance. He is not diaphoretic.      Comments: Unkept   HENT:      Head: Normocephalic.      Comments: Bruise to left forehead     Right Ear: External ear normal.      Left Ear: External ear normal.      Nose: Nose normal.      Mouth/Throat:      Mouth: Mucous membranes are moist.      Pharynx: Oropharynx is clear.   Eyes:      Extraocular Movements: Extraocular movements intact.      Conjunctiva/sclera: Conjunctivae normal.   Cardiovascular:      Rate and Rhythm: Normal rate and regular rhythm.      Pulses: Normal pulses.      Heart sounds: Normal heart sounds.   Pulmonary:      Effort: Pulmonary effort is normal. No respiratory distress.      Breath sounds: No wheezing or rales.   Chest:      Chest wall: Tenderness (to palpation, right chest, no s/o trauma) present.   Abdominal:      General: Abdomen is flat. Bowel sounds are normal. There is no distension.      Palpations: Abdomen is soft.      Tenderness: There is no abdominal tenderness. There is no guarding.    Musculoskeletal:         General: Normal range of motion.      Cervical back: Normal range of motion.      Comments: Hematoma on right shin   Skin:     General: Skin is warm and dry.      Capillary Refill: Capillary refill takes less than 2 seconds.   Neurological:      General: No focal deficit present.      Mental Status: He is alert and oriented to person, place, and time. Mental status is at baseline.      Cranial Nerves: No cranial nerve deficit.      Motor: Weakness (right sided hemiparesis, chronic per pt) present.   Psychiatric:         Mood and Affect: Mood normal.         Behavior: Behavior normal.         Current Facility-Administered Medications:   •  metoprolol tartrate (LOPRESSOR) tablet 25 mg, 25 mg, Oral, TWICE DAILY, Anirudh Collazo D.O., 25 mg at 03/17/21 0548  •  ipratropium-albuterol (DUONEB) nebulizer solution, 3 mL, Nebulization, Q4H PRN (RT), Yaya Garcia M.D.  •  tiotropium (Spiriva Respimat) 2.5 mcg/Act inhalation spray 5 mcg, 5 mcg, Inhalation, QDAILY (RT), Yaya Garcia M.D., 5 mcg at 03/17/21 0737  •  insulin regular (HumuLIN R,NovoLIN R) injection, 3-14 Units, Subcutaneous, 4X/DAY ACHS, 3 Units at 03/17/21 0548 **AND** POC Blood Glucose, , , Q AC AND BEDTIME(S) **AND** NOTIFY MD and PharmD, , , Once **AND** glucose 4 g chewable tablet 16 g, 16 g, Oral, Q15 MIN PRN **AND** dextrose 50% (D50W) injection 50 mL, 50 mL, Intravenous, Q15 MIN PRN, Elana Nguyen M.D.  •  omeprazole (PRILOSEC) capsule 40 mg, 40 mg, Oral, DAILY, Elana Nguyen M.D., 40 mg at 03/17/21 0548  •  lidocaine (LIDODERM) 5 % 1 Patch, 1 Patch, Transdermal, Q24HR, Elana Nguyen M.D., 1 Patch at 03/16/21 1148  •  oxyCODONE immediate-release (ROXICODONE) tablet 5 mg, 5 mg, Oral, Q4HRS PRN, Elana Nguyen M.D., 5 mg at 03/17/21 3356  •  labetalol (NORMODYNE/TRANDATE) injection 10-20 mg, 10-20 mg, Intravenous, Q6HRS PRN, Elana Nguyen M.D., 20 mg at 03/13/21 2200  •  aspirin EC (ECOTRIN) tablet 81 mg, 81 mg, Oral, DAILY,  Sharmin Barkley M.D., 81 mg at 03/17/21 0548  •  Respiratory Therapy Consult, , Nebulization, Continuous RT, Sharmin Barkley M.D.  •  albuterol inhaler 2 Puff, 2 Puff, Inhalation, Q6HRS PRN, Sharmin Barkley M.D.  •  SITagliptin (JANUVIA) tablet 100 mg, 100 mg, Oral, DAILY, Sharmin Barkley M.D., 100 mg at 03/17/21 0630  •  amLODIPine (NORVASC) tablet 10 mg, 10 mg, Oral, DAILY, Sharmin Barkley M.D., 10 mg at 03/17/21 0548  •  atorvastatin (LIPITOR) tablet 40 mg, 40 mg, Oral, Nightly, Sharmin Barkley M.D., 40 mg at 03/16/21 2031  •  clopidogrel (PLAVIX) tablet 75 mg, 75 mg, Oral, DAILY, Sharmin Barkley M.D., 75 mg at 03/17/21 0548  •  gabapentin (NEURONTIN) capsule 600 mg, 600 mg, Oral, QHS, Sharmin Barkley M.D., 600 mg at 03/16/21 2031  •  valsartan (DIOVAN) tablet 320 mg, 320 mg, Oral, QHS, Sharmin Barkley M.D., 320 mg at 03/16/21 2031  •  senna-docusate (PERICOLACE or SENOKOT S) 8.6-50 MG per tablet 2 tablet, 2 tablet, Oral, BID, 2 tablet at 03/16/21 1624 **AND** polyethylene glycol/lytes (MIRALAX) PACKET 1 Packet, 1 Packet, Oral, QDAY PRN **AND** magnesium hydroxide (MILK OF MAGNESIA) suspension 30 mL, 30 mL, Oral, QDAY PRN **AND** bisacodyl (DULCOLAX) suppository 10 mg, 10 mg, Rectal, QDAY PRN, Sharmin Barkley M.D.  •  enoxaparin (LOVENOX) inj 40 mg, 40 mg, Subcutaneous, DAILY, Sharmin Barkley M.D., 40 mg at 03/17/21 0548  •  acetaminophen (Tylenol) tablet 650 mg, 650 mg, Oral, Q6HRS PRN, Sharmin Barkley M.D.  •  ondansetron (ZOFRAN) syringe/vial injection 4 mg, 4 mg, Intravenous, Q4HRS PRN, Sharmin Barkley M.D.  •  ondansetron (ZOFRAN ODT) dispertab 4 mg, 4 mg, Oral, Q4HRS PRN, Sharmin Barkley M.D.      Fluids    Intake/Output Summary (Last 24 hours) at 3/17/2021 0741  Last data filed at 3/17/2021 0436  Gross per 24 hour   Intake --   Output 850 ml   Net -850 ml       Laboratory  Recent Labs     03/17/21  0312   WBC 11.8*   RBC 4.72   HEMOGLOBIN 14.0   HEMATOCRIT 42.1   MCV 89.2   MCH 29.7   MCHC 33.3*   RDW 44.0   PLATELETCT 276   MPV 9.1      Recent Labs     03/17/21  0312   SODIUM 135   POTASSIUM 3.8   CHLORIDE 103   CO2 26   GLUCOSE 159*   BUN 13   CREATININE 0.75   CALCIUM 8.5                   Imaging  US-EXTREMITY NON VASCULAR UNILATERAL RIGHT   Final Result      1.  There is a complex fluid collection in the soft tissues of the anterior right lower leg in the area of concern. Differential would include a resolving hematoma or abscess.      EC-ECHOCARDIOGRAM COMPLETE W/O CONT   Final Result      CT-CHEST,ABDOMEN,PELVIS WITH   Final Result      1.  No acute abnormality within the chest, abdomen, or pelvis      2.  Hepatic steatosis      3.  Aortic and branch vessel atherosclerotic plaque      4.  Incidental bilateral renal cyst      No follow up imaging is recommended per consensus guidelines of the 2019 ACR Incidental Findings Committee for probably benign incidental simple appearing renal cystic lesion(s) based on imaging criteria.         CT-TSPINE W/O PLUS RECONS   Final Result      Mild wedge deformities of T12 and T2 have a chronic appearance.      Multilevel degenerative changes as above described.         CT-LSPINE W/O PLUS RECONS   Final Result      Mild wedge deformity of T12 has a chronic appearance.      Degenerative changes as above described, including facet arthropathy.      Dorsal displacement of the coccyx which is of indeterminate age and may be chronic.      Atherosclerotic plaque.      CT-HEAD W/O   Final Result      No acute intracranial abnormality is identified.      There are periventricular and subcortical white matter changes present.  This finding is nonspecific and could be from previous small vessel ischemia, demyelination, or gliosis.      Hypodensity in the jaxon likely related to a prior lacunar infarct.      Encephalomalacia in the left occipital lobe is likely related to a prior infarct.      Left frontal scalp hematoma.      CT-CSPINE WITHOUT PLUS RECONS   Final Result      Multilevel degenerative changes as  above described.      Minimal grade 1 anterolisthesis of C5 on C6.      Minimal loss of height of T2 has a chronic appearance.         DX-CHEST-LIMITED (1 VIEW)   Final Result      Mild pulmonary vascular congestion.      Atherosclerotic plaque.      Elevation of the right hemidiaphragm.              Assessment/Plan  * Acute exacerbation of chronic obstructive pulmonary disease (COPD) ([facility])  Assessment & Plan  Acute on chronic exacerbation  With suspected pneumonitis -completed antibiotics  Not on o2 at baseline    Acute exacerbation resolved.  RT protocol    Discharge planning issues  Assessment & Plan  Patient oriented x2   OT- Not safe to DC home as patient is unable to complete ADLs  SLP cognitive eval notes patient needs 24/7 supervision.  Now recommends home health.  CM to help find NOK     Essential hypertension  Assessment & Plan  Controlled  Continue current home medications losartan, metoprolol, amlodipine  IV as needed medications have been ordered    Weakness  Assessment & Plan  Acute on chronic  Wheelchair bound at baseline  Chronic r hemiparesis    Encephalopathy  Assessment & Plan  Baseline unknown  Unclear if he had a concussion  CT head negative  [facility]  Neuro checks  Seizure precautions    SLP cognitive eval now recommends home health    DM (diabetes mellitus) ([facility])  Assessment & Plan  Lab Results   Component Value Date/Time    HBA1C 7.8 (H) 03/05/2021 2055     Results from last 7 days   Lab Units 03/17/21  1138 03/17/21  0547 03/16/21  2030 03/16/21  1631 03/16/21  1152 03/16/21  0551 03/15/21  2127 03/15/21  1704   ACCU CHECK GLUCOSE 788 mg/dL 189* 163* 197* 199* 173* 159* 174* 173*     I have ordered insulin sliding scale with D50 and glucagon for hypoglycemia per protocol.  Diabetic diet  Diabetic education    Closed head injury  Assessment & Plan  Evidence per [facility]  Found on ground with abrasion on head with stove in front of him dented  Suspect occurred during syncopal  event  CT with no acute abnormalities    Leukocytosis  Assessment & Plan  Likely 2/2 steroids completed 3/10  Completed doxycycline   Continue to monitor  Negative procalcitonin    Syncope  Assessment & Plan  With LOC  Hypoxia likely contributed  Echo- EF 55%    Acute respiratory failure with hypoxia (HCC)  Assessment & Plan  On room air today, will monitor  resolved       VTE prophylaxis: lovenox

## 2021-03-17 NOTE — PROGRESS NOTES
Patient's SpO2 86-92% RA, Patient educated on incentive spirometry and requested to attempt to utilize every commercial break, patient stated understanding and compliance to request.  Staff to reassess SpO2 and utilize oxygen therapy as needed.

## 2021-03-17 NOTE — PROGRESS NOTES
Assumed care at 1900. Received report from day shift RN. Pt is awake in bed, A&Ox 4, on RA, reports a pain level of 8/10. Fall precautions and appropriate signs in place. Call light and belongings within reach. Hourly rounding in place. Communication board updated. Pt denies any additional needs at this time.

## 2021-03-17 NOTE — PALLIATIVE CARE
Palliative Care follow-up  Pt's mentation improved and able to notarize AD today. PC updated pt on the plan to complete his AD today with the notary and he is agreeable. Email sent requesting notary services.      Updated: RN/MD    Plan: will scan AD and POLST once notarized    Thank you for allowing Palliative Care to support this patient and family. Contact [phone] for additional assistance, change in patient status, or with any questions/concerns.

## 2021-03-17 NOTE — CARE PLAN
Problem: Communication  Goal: The ability to communicate needs accurately and effectively will improve  Outcome: PROGRESSING AS EXPECTED  Note: Pt is able to effectively communicate with staff and appropriately uses the call light to alert RN to needs as they arise.      Problem: Pain Management  Goal: Pain level will decrease to patient's comfort goal  Outcome: PROGRESSING AS EXPECTED  Note: Pt reporting 8/10 pain, medicated per MAR. Upon reassessment, pt is resting and reporting 2/10 pain.

## 2021-03-17 NOTE — THERAPY
Occupational Therapy  Daily Treatment     Patient Name: Clint Duarte  Age:  66 y.o., Sex:  male  Medical Record #: [id number]  Today's Date: 3/17/2021     Precautions  Precautions: Fall Risk  Comments: encephalopathy and closed head injury (found on ground)    Assessment    Pt currently limited by decreased functional mobility, activity tolerance, balance, and pain which are affecting pt's ability to complete ADLs/IADLs at baseline. Pt would benefit from OT services in the acute care setting to maximize functional recovery.     Plan    Continue current treatment plan.    DC Equipment Recommendations: Unable to determine at this time  Discharge Recommendations: Recommend post-acute placement for additional occupational therapy services prior to discharge home       03/17/21 0837   Activities of Daily Living   Grooming Supervision   Upper Body Dressing Supervision   Lower Body Dressing Minimal Assist   Functional Mobility   Sit to Stand Minimal Assist   Bed, Chair, Wheelchair Transfer Moderate Assist   Short Term Goals   Short Term Goal # 1 Pt will perform toileting task with supervision   Goal Outcome # 1 Progressing as expected   Short Term Goal # 2 Pt will perform functional t/f's with supervision   Goal Outcome # 2 Progressing slower than expected

## 2021-03-18 LAB
ANION GAP SERPL CALC-SCNC: 8 MMOL/L (ref 7–16)
BASOPHILS # BLD AUTO: 0.4 % (ref 0–1.8)
BASOPHILS # BLD: 0.05 K/UL (ref 0–0.12)
BUN SERPL-MCNC: 12 MG/DL (ref 8–22)
CALCIUM SERPL-MCNC: 8.9 MG/DL (ref 8.5–10.5)
CHLORIDE SERPL-SCNC: 100 MMOL/L (ref 96–112)
CO2 SERPL-SCNC: 27 MMOL/L (ref 20–33)
CREAT SERPL-MCNC: 0.97 MG/DL (ref 0.5–1.4)
EOSINOPHIL # BLD AUTO: 0.22 K/UL (ref 0–0.51)
EOSINOPHIL NFR BLD: 1.9 % (ref 0–6.9)
ERYTHROCYTE [DISTWIDTH] IN BLOOD BY AUTOMATED COUNT: 42.7 FL (ref 35.9–50)
GLUCOSE BLD-MCNC: 164 MG/DL (ref 65–99)
GLUCOSE BLD-MCNC: 167 MG/DL (ref 65–99)
GLUCOSE BLD-MCNC: 186 MG/DL (ref 65–99)
GLUCOSE BLD-MCNC: 213 MG/DL (ref 65–99)
GLUCOSE SERPL-MCNC: 177 MG/DL (ref 65–99)
HCT VFR BLD AUTO: 43 % (ref 42–52)
HGB BLD-MCNC: 14.4 G/DL (ref 14–18)
IMM GRANULOCYTES # BLD AUTO: 0.05 K/UL (ref 0–0.11)
IMM GRANULOCYTES NFR BLD AUTO: 0.4 % (ref 0–0.9)
LYMPHOCYTES # BLD AUTO: 2.69 K/UL (ref 1–4.8)
LYMPHOCYTES NFR BLD: 23.7 % (ref 22–41)
MAGNESIUM SERPL-MCNC: 2.1 MG/DL (ref 1.5–2.5)
MCH RBC QN AUTO: 29.8 PG (ref 27–33)
MCHC RBC AUTO-ENTMCNC: 33.5 G/DL (ref 33.7–35.3)
MCV RBC AUTO: 88.8 FL (ref 81.4–97.8)
MONOCYTES # BLD AUTO: 1.19 K/UL (ref 0–0.85)
MONOCYTES NFR BLD AUTO: 10.5 % (ref 0–13.4)
NEUTROPHILS # BLD AUTO: 7.16 K/UL (ref 1.82–7.42)
NEUTROPHILS NFR BLD: 63.1 % (ref 44–72)
NRBC # BLD AUTO: 0 K/UL
NRBC BLD-RTO: 0 /100 WBC
PHOSPHATE SERPL-MCNC: 2.7 MG/DL (ref 2.5–4.5)
PLATELET # BLD AUTO: 308 K/UL (ref 164–446)
PMV BLD AUTO: 9.1 FL (ref 9–12.9)
POTASSIUM SERPL-SCNC: 4 MMOL/L (ref 3.6–5.5)
RBC # BLD AUTO: 4.84 M/UL (ref 4.7–6.1)
SODIUM SERPL-SCNC: 135 MMOL/L (ref 135–145)
WBC # BLD AUTO: 11.4 K/UL (ref 4.8–10.8)

## 2021-03-18 PROCEDURE — 99232 SBSQ HOSP IP/OBS MODERATE 35: CPT | Performed by: INTERNAL MEDICINE

## 2021-03-18 PROCEDURE — 700102 HCHG RX REV CODE 250 W/ 637 OVERRIDE(OP): Performed by: INTERNAL MEDICINE

## 2021-03-18 PROCEDURE — A9270 NON-COVERED ITEM OR SERVICE: HCPCS | Performed by: HOSPITALIST

## 2021-03-18 PROCEDURE — 83735 ASSAY OF MAGNESIUM: CPT

## 2021-03-18 PROCEDURE — 700111 HCHG RX REV CODE 636 W/ 250 OVERRIDE (IP): Performed by: HOSPITALIST

## 2021-03-18 PROCEDURE — 85025 COMPLETE CBC W/AUTO DIFF WBC: CPT

## 2021-03-18 PROCEDURE — 700102 HCHG RX REV CODE 250 W/ 637 OVERRIDE(OP): Performed by: HOSPITALIST

## 2021-03-18 PROCEDURE — 94760 N-INVAS EAR/PLS OXIMETRY 1: CPT

## 2021-03-18 PROCEDURE — 82962 GLUCOSE BLOOD TEST: CPT

## 2021-03-18 PROCEDURE — 84100 ASSAY OF PHOSPHORUS: CPT

## 2021-03-18 PROCEDURE — 36415 COLL VENOUS BLD VENIPUNCTURE: CPT

## 2021-03-18 PROCEDURE — 97164 PT RE-EVAL EST PLAN CARE: CPT

## 2021-03-18 PROCEDURE — 80048 BASIC METABOLIC PNL TOTAL CA: CPT

## 2021-03-18 PROCEDURE — 770006 HCHG ROOM/CARE - MED/SURG/GYN SEMI*

## 2021-03-18 PROCEDURE — A9270 NON-COVERED ITEM OR SERVICE: HCPCS | Performed by: INTERNAL MEDICINE

## 2021-03-18 PROCEDURE — 700101 HCHG RX REV CODE 250: Performed by: HOSPITALIST

## 2021-03-18 RX ADMIN — TIOTROPIUM BROMIDE INHALATION SPRAY 5 MCG: 3.12 SPRAY, METERED RESPIRATORY (INHALATION) at 08:00

## 2021-03-18 RX ADMIN — INSULIN HUMAN 4 UNITS: 100 INJECTION, SOLUTION PARENTERAL at 20:53

## 2021-03-18 RX ADMIN — OXYCODONE 5 MG: 5 TABLET ORAL at 04:35

## 2021-03-18 RX ADMIN — OXYCODONE 5 MG: 5 TABLET ORAL at 17:21

## 2021-03-18 RX ADMIN — OXYCODONE 5 MG: 5 TABLET ORAL at 23:56

## 2021-03-18 RX ADMIN — OMEPRAZOLE 40 MG: 20 CAPSULE, DELAYED RELEASE ORAL at 04:36

## 2021-03-18 RX ADMIN — METOPROLOL TARTRATE 25 MG: 25 TABLET, FILM COATED ORAL at 17:15

## 2021-03-18 RX ADMIN — LIDOCAINE 1 PATCH: 50 PATCH TOPICAL at 12:43

## 2021-03-18 RX ADMIN — INSULIN HUMAN 3 UNITS: 100 INJECTION, SOLUTION PARENTERAL at 11:15

## 2021-03-18 RX ADMIN — CLOPIDOGREL BISULFATE 75 MG: 75 TABLET ORAL at 04:35

## 2021-03-18 RX ADMIN — DIBASIC SODIUM PHOSPHATE, MONOBASIC POTASSIUM PHOSPHATE AND MONOBASIC SODIUM PHOSPHATE 500 MG: 852; 155; 130 TABLET ORAL at 08:01

## 2021-03-18 RX ADMIN — GABAPENTIN 600 MG: 300 CAPSULE ORAL at 20:56

## 2021-03-18 RX ADMIN — INSULIN HUMAN 3 UNITS: 100 INJECTION, SOLUTION PARENTERAL at 06:07

## 2021-03-18 RX ADMIN — ASPIRIN 81 MG: 81 TABLET, COATED ORAL at 04:35

## 2021-03-18 RX ADMIN — ENOXAPARIN SODIUM 40 MG: 40 INJECTION SUBCUTANEOUS at 04:37

## 2021-03-18 RX ADMIN — INSULIN HUMAN 3 UNITS: 100 INJECTION, SOLUTION PARENTERAL at 17:14

## 2021-03-18 RX ADMIN — VALSARTAN 320 MG: 80 TABLET, FILM COATED ORAL at 20:56

## 2021-03-18 RX ADMIN — SITAGLIPTIN 100 MG: 100 TABLET, FILM COATED ORAL at 04:35

## 2021-03-18 RX ADMIN — AMLODIPINE BESYLATE 10 MG: 10 TABLET ORAL at 04:36

## 2021-03-18 RX ADMIN — ATORVASTATIN CALCIUM 40 MG: 40 TABLET, FILM COATED ORAL at 20:56

## 2021-03-18 RX ADMIN — METOPROLOL TARTRATE 25 MG: 25 TABLET, FILM COATED ORAL at 04:35

## 2021-03-18 ASSESSMENT — PAIN DESCRIPTION - PAIN TYPE
TYPE: ACUTE PAIN

## 2021-03-18 ASSESSMENT — ENCOUNTER SYMPTOMS
ABDOMINAL PAIN: 0
DEPRESSION: 0
VOMITING: 0
DIZZINESS: 0
BLURRED VISION: 0
COUGH: 0
NAUSEA: 0
SHORTNESS OF BREATH: 0
FEVER: 0
MEMORY LOSS: 1
SORE THROAT: 0
DIARRHEA: 0
CHILLS: 0
FALLS: 1
PALPITATIONS: 0
FOCAL WEAKNESS: 1
CONSTIPATION: 0

## 2021-03-18 ASSESSMENT — COGNITIVE AND FUNCTIONAL STATUS - GENERAL
MOBILITY SCORE: 11
MOVING TO AND FROM BED TO CHAIR: A LOT
WALKING IN HOSPITAL ROOM: TOTAL
TURNING FROM BACK TO SIDE WHILE IN FLAT BAD: A LITTLE
CLIMB 3 TO 5 STEPS WITH RAILING: TOTAL
MOVING FROM LYING ON BACK TO SITTING ON SIDE OF FLAT BED: A LOT
STANDING UP FROM CHAIR USING ARMS: A LOT
SUGGESTED CMS G CODE MODIFIER MOBILITY: CL

## 2021-03-18 ASSESSMENT — GAIT ASSESSMENTS: GAIT LEVEL OF ASSIST: UNABLE TO PARTICIPATE

## 2021-03-18 NOTE — THERAPY
Physical Therapy   Initial Evaluation     Patient Name: Clint Duarte  Age:  66 y.o., Sex:  male  Medical Record #: [id number]  Today's Date: 3/18/2021     Precautions: Fall Risk    Assessment  Patient is 66 y.o. male presenting s/p fall d/t syncope w/ head strike and LOC, and recent hematoma on R LE.  He lives in a SS apartment on the 5th floor w/ and elevator to enter, and was receiving housekeeping services twice a week.  The pt was otherwise previously independent w/ bed mobility and transfers to power w/c which he used for mobility tasks.  He is currently limited with bed mobility requiring modA for trunk support, and performing squat pivot transfer Jalil for balance and stability EOB to chair.  The pt appears to be mostly limited in bed mobility by pain in his R LE.  Recommend placement based on pt's current limitations in functional mobility.  Will follow.      Plan    Recommend Physical Therapy 3 times per week until therapy goals are met for the following treatments:  Bed Mobility, Community Re-integration, Equipment, Manual Therapy, Neuro Re-Education / Balance, Orthotics Training, Self Care/Home Evaluation, Stair Training, Therapeutic Activities and Therapeutic Exercises    DC Equipment Recommendations: Unable to determine at this time  Discharge Recommendations: Recommend post-acute placement for additional physical therapy services prior to discharge home       Subjective/Objective       03/18/21 1245   Prior Living Situation   Prior Services Housekeeping / Homemaker Services  (Mondays and Fridays)   Housing / Facility 1 Story Apartment / Condo  (5th floor)   Elevator Yes   Equipment Owned Power Wheel Chair   Lives with - Patient's Self Care Capacity Alone and Unable to Care For Self   Comments Pt lives alone in his apartment, and recieves housekeeping services 2x/wk   Prior Level of Functional Mobility   Bed Mobility Independent   Transfer Status Independent   Ambulation Other (Comments)  (non-ambulatory)    Assistive Devices Used Power Wheel Chair   Wheelchair   (community distances)   Comments Pt previously transfers bed to power w/c, and uses power w/c for mobility   History of Falls   History of Falls Yes   Date of Last Fall   (reason for admit)   Cognition    Cognition / Consciousness X   Level of Consciousness Alert   Safety Awareness Impaired   New Learning Impaired   Comments pt appears to have limited safety awareness and impulsive behavior contributing to his falls   Strength Lower Body   Lower Body Strength  X   Comments RLE weakness d/t previous CVA   Sensation Lower Body   Lower Extremity Sensation   WDL   Comments sensation to light touch intact BLE's   Neurological Concerns   Neurological Concerns Yes   Comments R LE spasticity   Balance Assessment   Sitting Balance (Static) Fair   Sitting Balance (Dynamic) Fair   Standing Balance (Static) Poor +   Standing Balance (Dynamic) Poor   Weight Shift Sitting Fair   Weight Shift Standing Poor   Comments limited weight shift to R LE d/t pain   Gait Analysis   Gait Level Of Assist Unable to Participate   Comments non-ambulatory at baseline   Bed Mobility    Supine to Sit Moderate Assist  (UE assist)   Sit to Supine   (pt position seated in chair end of tx session)   Scooting Supervised   Comments HOB raised   Functional Mobility   Sit to Stand Minimal Assist   Bed, Chair, Wheelchair Transfer Minimal Assist   Transfer Method Squat Pivot   Mobility EOB->chair    Comments Jalil for balance/stability   How much difficulty does the patient currently have...   Turning over in bed (including adjusting bedclothes, sheets and blankets)? 3   Sitting down on and standing up from a chair with arms (e.g., wheelchair, bedside commode, etc.) 2   Moving from lying on back to sitting on the side of the bed? 2   How much help from another person does the patient currently need...   Moving to and from a bed to a chair (including a wheelchair)? 2   Need to walk in a hospital room? 1    Climbing 3-5 steps with a railing? 1   6 clicks Mobility Score 11   Activity Tolerance   Sitting in Chair >10min   Sitting Edge of Bed 10min   Standing <1min   Short Term Goals    Short Term Goal # 1 The pt will demonstrate supine<>sit EOB w/ HOB flat spv in 6 visits to prepare for transfers.   Short Term Goal # 2 The pt will demonstrate squat-pivot transfer EOB<>chair spv in 6 visits for functional mobility.   Education Group   Education Provided Role of Physical Therapist   Role of Physical Therapist Patient Response Patient;Acceptance;Explanation;Demonstration;Verbal Demonstration;Action Demonstration   Additional Comments regarding outcome of tx session   Problem List    Problems Pain;Impaired Bed Mobility;Impaired Transfers;Safety Awareness Deficits / Cognition   Anticipated Discharge Equipment and Recommendations   DC Equipment Recommendations Unable to determine at this time   Discharge Recommendations Recommend post-acute placement for additional physical therapy services prior to discharge home

## 2021-03-18 NOTE — CARE PLAN
Problem: Communication  Goal: The ability to communicate needs accurately and effectively will improve  Outcome: PROGRESSING AS EXPECTED     Problem: Safety  Goal: Will remain free from injury  Outcome: PROGRESSING AS EXPECTED         Patient has bed lowered and placed in locked position, bed alarm is set. Patient is able to communicate appropriately at this time.

## 2021-03-18 NOTE — CARE PLAN
Problem: Safety  Goal: Will remain free from falls  Outcome: PROGRESSING AS EXPECTED  Pt is a high fall risk due to weakness and pain in his right lower leg. Treaded socks are on, he is encouraged to call, bed is locked and in the lowest position, and bedside commitment is being utilized.     Problem: Pain Management  Goal: Pain level will decrease to patient's comfort goal  Outcome: PROGRESSING AS EXPECTED   Pt complains of high pain in his right lower extremity, elevation, compression, and ice is administered to area.

## 2021-03-18 NOTE — PROGRESS NOTES
[facility] Medicine Daily Progress Note    Date of Service  3/18/2021    Chief Complaint  66 y.o. male admitted 3/5/2021 with past medical history of stroke with right-sided hemiplegia, diabetes, COPD who presents to the ER with a syncope.    Hospital Course  Mr. Clint Duarte is a 66 y.o. male with a history of stroke with right-sided hemiplegia, diabetes, COPD not on baseline oxygen who presented on 3/5/2021 with syncope.  Patient hit his head on his stove.  Had likely been down for several hours.  On presentation he was found to be hypoxic and treated for COPD exacerbation.  CT head, chest abdomen pelvis showed no acute abnormalities.  CT spine showed chronic compression fractures.  Echocardiogram showed ejection fraction 55%.    Interval Problem Update  Patient was seen and examined at bedside.  I have personally reviewed and interpreted vitals, labs, and imaging.  3/16.  Afebrile.  Stable vitals.  On room air.  Titrate blood pressure regimen.  Patient alert and oriented x3 but cannot member why he came to the hospital.  Denies fevers, chills, chest pains, shortness of breath.  He does report rib pain right-sided when he takes deep breath.  Had cognitive evaluation by speech therapy who recommended home health.    3/17.  Afebrile.  Stable vitals.  On room air.  Replete potassium.  Denies fever, chills, chest pain, shortness of breath.  Reports persistent right rib pain and right shin pain.  Xray shows no fracture of tib/fib.    3/18.  Afebrile.  Stable vitals.  On 2 L nasal cannula this morning.  Replete phosphorus.  Denies fever, chills, chest pains, shortness of breath.  Alert and oriented x3.  Still complains of pain to right ribs and right shin which are improved.  He understands that cannot take care of himself at home with his right-sided weakness, wheelchair-bound, recent falls.  Updated PT note also recommends SNF    Consultants/Specialty  None    Code Status  DNAR/DNI    Disposition  Has improved  cognitively to where SLP now recommends home health rather than 24/7 supervision.  [facility]  Lives by himself with no friends or family prior to admission    Review of Systems  Review of Systems   Constitutional: Negative for chills and fever.   HENT: Negative for congestion and sore throat.    Eyes: Negative for blurred vision.   Respiratory: Negative for cough and shortness of breath.    Cardiovascular: Positive for chest pain (right sided chest wall). Negative for palpitations and leg swelling.   Gastrointestinal: Negative for abdominal pain, constipation, diarrhea, nausea and vomiting.   Genitourinary: Negative for dysuria.   Musculoskeletal: Positive for falls (Fall and loss of consciousness prior to admission) and joint pain (Right shin).   Skin: Negative for rash.   Neurological: Positive for focal weakness (r sided, per pt stable x 10 years). Negative for dizziness.   Psychiatric/Behavioral: Positive for memory loss. Negative for depression.   All other systems reviewed and are negative.       Physical Exam  Temp:  [36.4 °C (97.5 °F)-36.8 °C (98.2 °F)] 36.6 °C (97.8 °F)  Pulse:  [63-72] 63  Resp:  [16-20] 16  BP: (117-144)/(61-74) 121/69  SpO2:  [87 %-94 %] 94 %    Physical Exam  Vitals and nursing note reviewed.   Constitutional:       General: He is not in acute distress.     Appearance: Normal appearance. He is not diaphoretic.      Comments: Unkept   HENT:      Head: Normocephalic.      Comments: Bruise to left forehead     Right Ear: External ear normal.      Left Ear: External ear normal.      Nose: Nose normal.      Mouth/Throat:      Mouth: Mucous membranes are moist.      Pharynx: Oropharynx is clear.   Eyes:      Extraocular Movements: Extraocular movements intact.      Conjunctiva/sclera: Conjunctivae normal.   Cardiovascular:      Rate and Rhythm: Normal rate and regular rhythm.      Pulses: Normal pulses.      Heart sounds: Normal heart sounds.    Pulmonary:      Effort: Pulmonary effort is normal. No respiratory distress.      Breath sounds: No wheezing or rales.   Chest:      Chest wall: Tenderness (to palpation, right chest, no s/o trauma) present.   Abdominal:      General: Abdomen is flat. Bowel sounds are normal. There is no distension.      Palpations: Abdomen is soft.      Tenderness: There is no abdominal tenderness. There is no guarding.   Musculoskeletal:         General: Normal range of motion.      Cervical back: Normal range of motion.      Comments: Hematoma on right shin   Skin:     General: Skin is warm and dry.      Capillary Refill: Capillary refill takes less than 2 seconds.   Neurological:      General: No focal deficit present.      Mental Status: He is alert and oriented to person, place, and time. Mental status is at baseline.      Cranial Nerves: No cranial nerve deficit.      Motor: Weakness (right sided hemiparesis, chronic per pt) present.   Psychiatric:         Mood and Affect: Mood normal.         Behavior: Behavior normal.         Current Facility-Administered Medications:   •  metoprolol tartrate (LOPRESSOR) tablet 25 mg, 25 mg, Oral, TWICE DAILY, Anirudh Collazo D.O., 25 mg at 03/18/21 0435  •  ipratropium-albuterol (DUONEB) nebulizer solution, 3 mL, Nebulization, Q4H PRN (RT), Yaya Garcia M.D.  •  tiotropium (Spiriva Respimat) 2.5 mcg/Act inhalation spray 5 mcg, 5 mcg, Inhalation, QDAILY (RT), Yaya Garcia M.D., 5 mcg at 03/17/21 0737  •  insulin regular (HumuLIN R,NovoLIN R) injection, 3-14 Units, Subcutaneous, 4X/DAY ACHS, 3 Units at 03/18/21 0607 **AND** POC Blood Glucose, , , Q AC AND BEDTIME(S) **AND** NOTIFY MD and PharmD, , , Once **AND** glucose 4 g chewable tablet 16 g, 16 g, Oral, Q15 MIN PRN **AND** dextrose 50% (D50W) injection 50 mL, 50 mL, Intravenous, Q15 MIN PRN, Elana Nguyen M.D.  •  omeprazole (PRILOSEC) capsule 40 mg, 40 mg, Oral, DAILY, Elana Nguyen M.D., 40 mg at 03/18/21 0436  •  lidocaine  (LIDODERM) 5 % 1 Patch, 1 Patch, Transdermal, Q24HR, Elana Nguyen M.D., 1 Patch at 03/17/21 1141  •  oxyCODONE immediate-release (ROXICODONE) tablet 5 mg, 5 mg, Oral, Q4HRS PRN, Elana Nguyen M.D., 5 mg at 03/18/21 0435  •  labetalol (NORMODYNE/TRANDATE) injection 10-20 mg, 10-20 mg, Intravenous, Q6HRS PRN, Elana Nguyen M.D., 20 mg at 03/13/21 2205  •  aspirin EC (ECOTRIN) tablet 81 mg, 81 mg, Oral, DAILY, Sharmin Barkley M.D., 81 mg at 03/18/21 0435  •  Respiratory Therapy Consult, , Nebulization, Continuous RT, Sharmin Barkley M.D.  •  albuterol inhaler 2 Puff, 2 Puff, Inhalation, Q6HRS PRN, Sharimn Barkley M.D.  •  SITagliptin (JANUVIA) tablet 100 mg, 100 mg, Oral, DAILY, Sharmin Barkley M.D., 100 mg at 03/18/21 0435  •  amLODIPine (NORVASC) tablet 10 mg, 10 mg, Oral, DAILY, Sharmin Barkley M.D., 10 mg at 03/18/21 0436  •  atorvastatin (LIPITOR) tablet 40 mg, 40 mg, Oral, Nightly, Sharmin Barkley M.D., 40 mg at 03/17/21 2034  •  clopidogrel (PLAVIX) tablet 75 mg, 75 mg, Oral, DAILY, Sharmin Barkley M.D., 75 mg at 03/18/21 0435  •  gabapentin (NEURONTIN) capsule 600 mg, 600 mg, Oral, QHS, Sharmin Barkley M.D., 600 mg at 03/17/21 2034  •  valsartan (DIOVAN) tablet 320 mg, 320 mg, Oral, QHS, Sharmin Barkley M.D., 320 mg at 03/17/21 2039  •  senna-docusate (PERICOLACE or SENOKOT S) 8.6-50 MG per tablet 2 tablet, 2 tablet, Oral, BID, 2 tablet at 03/17/21 1710 **AND** polyethylene glycol/lytes (MIRALAX) PACKET 1 Packet, 1 Packet, Oral, QDAY PRN **AND** magnesium hydroxide (MILK OF MAGNESIA) suspension 30 mL, 30 mL, Oral, QDAY PRN **AND** bisacodyl (DULCOLAX) suppository 10 mg, 10 mg, Rectal, QDAY PRN, Sharmin Barkley M.D.  •  enoxaparin (LOVENOX) inj 40 mg, 40 mg, Subcutaneous, DAILY, Sharmin Barkley M.D., 40 mg at 03/18/21 0437  •  acetaminophen (Tylenol) tablet 650 mg, 650 mg, Oral, Q6HRS PRN, Sharmin Barkley M.D.  •  ondansetron (ZOFRAN) syringe/vial injection 4 mg, 4 mg, Intravenous, Q4HRS PRN, Sharmin Barkley M.D.  •  ondansetron (ZOFRAN  ODT) dispertab 4 mg, 4 mg, Oral, Q4HRS PRN, Sharmin Barkley M.D.      Fluids    Intake/Output Summary (Last 24 hours) at 3/18/2021 0724  Last data filed at 3/18/2021 0200  Gross per 24 hour   Intake 120 ml   Output 900 ml   Net -780 ml       Laboratory  Recent Labs     03/17/21 0312 03/18/21 0228   WBC 11.8* 11.4*   RBC 4.72 4.84   HEMOGLOBIN 14.0 14.4   HEMATOCRIT 42.1 43.0   MCV 89.2 88.8   MCH 29.7 29.8   MCHC 33.3* 33.5*   RDW 44.0 42.7   PLATELETCT 276 308   MPV 9.1 9.1     Recent Labs     03/17/21 0312 03/18/21 0228   SODIUM 135 135   POTASSIUM 3.8 4.0   CHLORIDE 103 100   CO2 26 27   GLUCOSE 159* 177*   BUN 13 12   CREATININE 0.75 0.97   CALCIUM 8.5 8.9                   Imaging  DX-TIBIA AND FIBULA RIGHT   Final Result      No radiographic evidence of acute traumatic injury.      US-EXTREMITY NON VASCULAR UNILATERAL RIGHT   Final Result      1.  There is a complex fluid collection in the soft tissues of the anterior right lower leg in the area of concern. Differential would include a resolving hematoma or abscess.      EC-ECHOCARDIOGRAM COMPLETE W/O CONT   Final Result      CT-CHEST,ABDOMEN,PELVIS WITH   Final Result      1.  No acute abnormality within the chest, abdomen, or pelvis      2.  Hepatic steatosis      3.  Aortic and branch vessel atherosclerotic plaque      4.  Incidental bilateral renal cyst      No follow up imaging is recommended per consensus guidelines of the 2019 ACR Incidental Findings Committee for probably benign incidental simple appearing renal cystic lesion(s) based on imaging criteria.         CT-TSPINE W/O PLUS RECONS   Final Result      Mild wedge deformities of T12 and T2 have a chronic appearance.      Multilevel degenerative changes as above described.         CT-LSPINE W/O PLUS RECONS   Final Result      Mild wedge deformity of T12 has a chronic appearance.      Degenerative changes as above described, including facet arthropathy.      Dorsal displacement of the coccyx which  is of indeterminate age and may be chronic.      Atherosclerotic plaque.      CT-HEAD W/O   Final Result      No acute intracranial abnormality is identified.      There are periventricular and subcortical white matter changes present.  This finding is nonspecific and could be from previous small vessel ischemia, demyelination, or gliosis.      Hypodensity in the jaxon likely related to a prior lacunar infarct.      Encephalomalacia in the left occipital lobe is likely related to a prior infarct.      Left frontal scalp hematoma.      CT-CSPINE WITHOUT PLUS RECONS   Final Result      Multilevel degenerative changes as above described.      Minimal grade 1 anterolisthesis of C5 on C6.      Minimal loss of height of T2 has a chronic appearance.         DX-CHEST-LIMITED (1 VIEW)   Final Result      Mild pulmonary vascular congestion.      Atherosclerotic plaque.      Elevation of the right hemidiaphragm.              Assessment/Plan  * Acute exacerbation of chronic obstructive pulmonary disease (COPD) ([facility])  Assessment & Plan  Acute on chronic exacerbation  With suspected pneumonitis -completed antibiotics  Not on o2 at baseline    Acute exacerbation resolved.  RT protocol    Discharge planning issues  Assessment & Plan  Patient oriented x2   OT- Not safe to DC home as patient is unable to complete ADLs  SLP cognitive eval notes patient needs 24/7 supervision.  Now recommends home health.  CM to help find NOK     Essential hypertension  Assessment & Plan  Controlled  Continue current home medications losartan, metoprolol, amlodipine  IV as needed medications have been ordered    Weakness  Assessment & Plan  Acute on chronic  Wheelchair bound at baseline  Chronic r hemiparesis    Encephalopathy  Assessment & Plan  Baseline unknown  Unclear if he had a concussion  CT head negative  [facility]  Neuro checks  Seizure precautions    SLP cognitive eval now recommends home health    DM (diabetes mellitus)  ([facility])  Assessment & Plan  Lab Results   Component Value Date/Time    HBA1C 7.8 (H) 03/05/2021 2055     Results from last 7 days   Lab Units 03/18/21  1058 03/18/21  0607 03/17/21 2023 03/17/21  1638 03/17/21  1138 03/17/21  0547 03/16/21 2030 03/16/21  1631   ACCU CHECK GLUCOSE 788 mg/dL 167* 164* 188* 131* 189* 163* 197* 199*     I have ordered insulin sliding scale with D50 and glucagon for hypoglycemia per protocol.  Diabetic diet  Diabetic education    Closed head injury  Assessment & Plan  Evidence per [facility]  Found on ground with abrasion on head with stove in front of him dented  Suspect occurred during syncopal event  CT with no acute abnormalities    Leukocytosis  Assessment & Plan  Likely 2/2 steroids completed 3/10  Completed doxycycline for COPD exacerbation  Continue to monitor  Negative procalcitonin    Syncope  Assessment & Plan  With LOC  Hypoxia likely contributed  Echo- EF 55%    Acute respiratory failure with hypoxia (HCC)  Assessment & Plan  On room air today, will monitor  resolved       VTE prophylaxis: lovenox

## 2021-03-18 NOTE — PALLIATIVE CARE
Palliative Care follow-up    Pt's AD/POLST completed, scanned into EMR, originals placed on hard chart and should be sent with pt upon discharge.      To locate the AD/POLST, please hover the cursor over the patient's code status to find all linked ACP documents.

## 2021-03-18 NOTE — PROGRESS NOTES
Assume care of pt at 0700. Report received from NOC RN. Pt is A/O 4x. Pt denies pain. Pt is resting in bed. Bed in lowest and locked position, call light within reach, hourly rounding in place. Labs reviewed. Communication board updated. Will continue to monitor.     No other complaints at this time.

## 2021-03-18 NOTE — PROGRESS NOTES
Report received from Jack VEGA RN. Assumed care, assessment complete at 2045. Pt is A & O x 4.  Pt reports pain at this time to right shoulder, 8/10 see mar. Fall precautions and appropriate signs in place. Pt oriented to unit routine, call light/phone system and RN extension number provided. Pt educated regarding fall precautions. Bed alarm in use. Pt denies any additional needs at this time. Call light within reach.

## 2021-03-19 VITALS
RESPIRATION RATE: 17 BRPM | HEIGHT: 71 IN | DIASTOLIC BLOOD PRESSURE: 55 MMHG | HEART RATE: 67 BPM | WEIGHT: 203.71 LBS | TEMPERATURE: 97.7 F | BODY MASS INDEX: 28.52 KG/M2 | SYSTOLIC BLOOD PRESSURE: 121 MMHG | OXYGEN SATURATION: 93 %

## 2021-03-19 PROBLEM — S09.90XA CLOSED HEAD INJURY: Status: RESOLVED | Noted: 2021-03-05 | Resolved: 2021-03-19

## 2021-03-19 PROBLEM — J44.1 ACUTE EXACERBATION OF CHRONIC OBSTRUCTIVE PULMONARY DISEASE (COPD) (HCC): Status: RESOLVED | Noted: 2021-03-05 | Resolved: 2021-03-19

## 2021-03-19 PROBLEM — G93.40 ENCEPHALOPATHY: Status: RESOLVED | Noted: 2021-03-05 | Resolved: 2021-03-19

## 2021-03-19 PROBLEM — R55 SYNCOPE: Status: RESOLVED | Noted: 2021-03-05 | Resolved: 2021-03-19

## 2021-03-19 LAB
GLUCOSE BLD-MCNC: 134 MG/DL (ref 65–99)
GLUCOSE BLD-MCNC: 232 MG/DL (ref 65–99)

## 2021-03-19 PROCEDURE — 94640 AIRWAY INHALATION TREATMENT: CPT

## 2021-03-19 PROCEDURE — A9270 NON-COVERED ITEM OR SERVICE: HCPCS | Performed by: HOSPITALIST

## 2021-03-19 PROCEDURE — 700102 HCHG RX REV CODE 250 W/ 637 OVERRIDE(OP): Performed by: INTERNAL MEDICINE

## 2021-03-19 PROCEDURE — 700102 HCHG RX REV CODE 250 W/ 637 OVERRIDE(OP): Performed by: HOSPITALIST

## 2021-03-19 PROCEDURE — 700111 HCHG RX REV CODE 636 W/ 250 OVERRIDE (IP): Performed by: INTERNAL MEDICINE

## 2021-03-19 PROCEDURE — 90471 IMMUNIZATION ADMIN: CPT

## 2021-03-19 PROCEDURE — A9270 NON-COVERED ITEM OR SERVICE: HCPCS | Performed by: INTERNAL MEDICINE

## 2021-03-19 PROCEDURE — 3E02340 INTRODUCTION OF INFLUENZA VACCINE INTO MUSCLE, PERCUTANEOUS APPROACH: ICD-10-PCS | Performed by: INTERNAL MEDICINE

## 2021-03-19 PROCEDURE — 82962 GLUCOSE BLOOD TEST: CPT

## 2021-03-19 PROCEDURE — 99239 HOSP IP/OBS DSCHRG MGMT >30: CPT | Performed by: INTERNAL MEDICINE

## 2021-03-19 PROCEDURE — 700111 HCHG RX REV CODE 636 W/ 250 OVERRIDE (IP): Performed by: HOSPITALIST

## 2021-03-19 PROCEDURE — 90662 IIV NO PRSV INCREASED AG IM: CPT | Performed by: INTERNAL MEDICINE

## 2021-03-19 RX ORDER — ASPIRIN 81 MG/1
81 TABLET ORAL DAILY
Qty: 100 TABLET | Refills: 0 | Status: SHIPPED | OUTPATIENT
Start: 2021-03-20

## 2021-03-19 RX ORDER — OMEPRAZOLE 40 MG/1
40 CAPSULE, DELAYED RELEASE ORAL DAILY
Qty: 90 CAPSULE | Refills: 0 | Status: SHIPPED | OUTPATIENT
Start: 2021-03-20

## 2021-03-19 RX ORDER — OXYCODONE HYDROCHLORIDE 5 MG/1
5 TABLET ORAL EVERY 4 HOURS PRN
Qty: 20 TABLET | Refills: 0 | Status: SHIPPED | OUTPATIENT
Start: 2021-03-19 | End: 2021-03-24

## 2021-03-19 RX ADMIN — OXYCODONE 5 MG: 5 TABLET ORAL at 11:32

## 2021-03-19 RX ADMIN — METOPROLOL TARTRATE 25 MG: 25 TABLET, FILM COATED ORAL at 05:44

## 2021-03-19 RX ADMIN — TIOTROPIUM BROMIDE INHALATION SPRAY 5 MCG: 3.12 SPRAY, METERED RESPIRATORY (INHALATION) at 08:00

## 2021-03-19 RX ADMIN — OMEPRAZOLE 40 MG: 20 CAPSULE, DELAYED RELEASE ORAL at 05:44

## 2021-03-19 RX ADMIN — OXYCODONE 5 MG: 5 TABLET ORAL at 05:44

## 2021-03-19 RX ADMIN — INFLUENZA A VIRUS A/MICHIGAN/45/2015 X-275 (H1N1) ANTIGEN (FORMALDEHYDE INACTIVATED), INFLUENZA A VIRUS A/SINGAPORE/INFIMH-16-0019/2016 IVR-186 (H3N2) ANTIGEN (FORMALDEHYDE INACTIVATED), INFLUENZA B VIRUS B/PHUKET/3073/2013 ANTIGEN (FORMALDEHYDE INACTIVATED), AND INFLUENZA B VIRUS B/MARYLAND/15/2016 BX-69A ANTIGEN (FORMALDEHYDE INACTIVATED) 0.7 ML: 60; 60; 60; 60 INJECTION, SUSPENSION INTRAMUSCULAR at 14:10

## 2021-03-19 RX ADMIN — CLOPIDOGREL BISULFATE 75 MG: 75 TABLET ORAL at 05:44

## 2021-03-19 RX ADMIN — ASPIRIN 81 MG: 81 TABLET, COATED ORAL at 05:44

## 2021-03-19 RX ADMIN — SITAGLIPTIN 100 MG: 100 TABLET, FILM COATED ORAL at 05:46

## 2021-03-19 RX ADMIN — AMLODIPINE BESYLATE 10 MG: 10 TABLET ORAL at 05:44

## 2021-03-19 RX ADMIN — ENOXAPARIN SODIUM 40 MG: 40 INJECTION SUBCUTANEOUS at 05:44

## 2021-03-19 RX ADMIN — INSULIN HUMAN 4 UNITS: 100 INJECTION, SOLUTION PARENTERAL at 11:20

## 2021-03-19 RX ADMIN — DOCUSATE SODIUM 50 MG AND SENNOSIDES 8.6 MG 2 TABLET: 8.6; 5 TABLET, FILM COATED ORAL at 05:44

## 2021-03-19 ASSESSMENT — PAIN DESCRIPTION - PAIN TYPE
TYPE: ACUTE PAIN
TYPE: ACUTE PAIN

## 2021-03-19 NOTE — DISCHARGE PLANNING
Agency/Facility Name: [facility]  Spoke To: Linnette  Outcome: needs the MD to put an update to the DC Summary since it says to do home meds one of which is epclusa, and [facility]    Received Transport Form @ 1200  Spoke to Alayna @ [facility]    Transport is scheduled for 3/19 @1400 going to [facility].    Agency/Facility Name: [facility]  Spoke To: Mango  Outcome: Accepted and has a bed today. Informed of transport time.

## 2021-03-19 NOTE — PROGRESS NOTES
Discharge patient with all of his belongings to [facility]. Report given and patient left with [facility].

## 2021-03-19 NOTE — DISCHARGE INSTRUCTIONS
Discharge Instructions per SEDA MartinezORina    DIET: Diet Order Diet: Level 6 - Soft and Bite Sized (Cardiac); Liquid level: Level 0 - Thin; Second Modifier: (optional): Consistent CHO (Diabetic)    ACTIVITY: As tolerated    A proper diet that is low in grease, fat, and salt, along with 30 minutes of exercise per day will lead to weight loss, and better controlled blood sugar and blood pressure.    DIAGNOSIS: Acute exacerbation of chronic obstructive pulmonary disease (COPD) (HCC), syncope    Follow up with your Primary Care Provider  as scheduled or sooner if your symptoms persist or worsen.  Return to Emergency Room for sever chest pain, shortness of breath, signs of a stroke, or any other emergencies.      otherDischarge Instructions    Discharged to  by ambulance with escort. Discharged via ambulance, hospital escort: Yes.  Special equipment needed: Not Applicable    Be sure to schedule a follow-up appointment with your primary care doctor or any specialists as instructed.     Discharge Plan:   Diet Plan: Discussed  Activity Level: Discussed  Confirmed Follow up Appointment: Patient to Call and Schedule Appointment  Confirmed Symptoms Management: Discussed  Medication Reconciliation Updated: Yes  Influenza Vaccine Indication: Indicated: 65 years and older  Influenza Vaccine Given - only chart on this line when given: Influenza Vaccine Given (See MAR)    I understand that a diet low in cholesterol, fat, and sodium is recommended for good health. Unless I have been given specific instructions below for another diet, I accept this instruction as my diet prescription.   Other diet: Soft and Bite Sized cardiac    Special Instructions: None    · Is patient discharged on Warfarin / Coumadin?   No       COPD and Physical Activity  Chronic obstructive pulmonary disease (COPD) is a long-term (chronic) condition that affects the lungs. COPD is a general term that can be used to describe many different lung  problems that cause lung swelling (inflammation) and limit airflow, including chronic bronchitis and emphysema.  The main symptom of COPD is shortness of breath, which makes it harder to do even simple tasks. This can also make it harder to exercise and be active. Talk with your health care provider about treatments to help you breathe better and actions you can take to prevent breathing problems during physical activity.  What are the benefits of exercising with COPD?  Exercising regularly is an important part of a healthy lifestyle. You can still exercise and do physical activities even though you have COPD. Exercise and physical activity improve your shortness of breath by increasing blood flow (circulation). This causes your heart to pump more oxygen through your body. Moderate exercise can improve your:  · Oxygen use.  · Energy level.  · Shortness of breath.  · Strength in your breathing muscles.  · Heart health.  · Sleep.  · Self-esteem and feelings of self-worth.  · Depression, stress, and anxiety levels.  Exercise can benefit everyone with COPD. The severity of your disease may affect how hard you can exercise, especially at first, but everyone can benefit. Talk with your health care provider about how much exercise is safe for you, and which activities and exercises are safe for you.  What actions can I take to prevent breathing problems during physical activity?  · Sign up for a pulmonary rehabilitation program. This type of program may include:  ? Education about lung diseases.  ? Exercise classes that teach you how to exercise and be more active while improving your breathing. This usually involves:  § Exercise using your lower extremities, such as a stationary bicycle.  § About 30 minutes of exercise, 2 to 5 times per week, for 6 to 12 weeks  § Strength training, such as push ups or leg lifts.  ? Nutrition education.  ? Group classes in which you can talk with others who also have COPD and learn ways to  manage stress.  · If you use an oxygen tank, you should use it while you exercise. Work with your health care provider to adjust your oxygen for your physical activity. Your resting flow rate is different from your flow rate during physical activity.  · While you are exercising:  ? Take slow breaths.  ? Pace yourself and do not try to go too fast.  ? Purse your lips while breathing out. Pursing your lips is similar to a kissing or whistling position.  ? If doing exercise that uses a quick burst of effort, such as weight lifting:  § Breathe in before starting the exercise.  § Breathe out during the hardest part of the exercise (such as raising the weights).  Where to find support  You can find support for exercising with COPD from:  · Your health care provider.  · A pulmonary rehabilitation program.  · Your local health department or community health programs.  · Support groups, online or in-person. Your health care provider may be able to recommend support groups.  Where to find more information  You can find more information about exercising with COPD from:  · American Lung Association: lung.org.  · COPD Foundation: copdfoundation.org.  Contact a health care provider if:  · Your symptoms get worse.  · You have chest pain.  · You have nausea.  · You have a fever.  · You have trouble talking or catching your breath.  · You want to start a new exercise program or a new activity.  Summary  · COPD is a general term that can be used to describe many different lung problems that cause lung swelling (inflammation) and limit airflow. This includes chronic bronchitis and emphysema.  · Exercise and physical activity improve your shortness of breath by increasing blood flow (circulation). This causes your heart to provide more oxygen to your body.  · Contact your health care provider before starting any exercise program or new activity. Ask your health care provider what exercises and activities are safe for you.  This  information is not intended to replace advice given to you by your health care provider. Make sure you discuss any questions you have with your health care provider.  Document Released: 01/10/2019 Document Revised: 04/08/2020 Document Reviewed: 01/10/2019  Elsevier Patient Education © 2020 Elsevier Inc.      Depression / Suicide Risk    As you are discharged from this [facility] facility, it is important to learn how to keep safe from harming yourself.    Recognize the warning signs:  · Abrupt changes in personality, positive or negative- including increase in energy   · Giving away possessions  · Change in eating patterns- significant weight changes-  positive or negative  · Change in sleeping patterns- unable to sleep or sleeping all the time   · Unwillingness or inability to communicate  · Depression  · Unusual sadness, discouragement and loneliness  · Talk of wanting to die  · Neglect of personal appearance   · Rebelliousness- reckless behavior  · Withdrawal from people/activities they love  · Confusion- inability to concentrate     If you or a loved one observes any of these behaviors or has concerns about self-harm, here's what you can do:  · Talk about it- your feelings and reasons for harming yourself  · Remove any means that you might use to hurt yourself (examples: pills, rope, extension cords, firearm)  · Get professional help from the community (Mental Health, Substance Abuse, psychological counseling)  · Do not be alone:Call your Safe Contact- someone whom you trust who will be there for you.  · Call your local CRISIS HOTLINE 534-6221 or 942-738-1038  · Call your local Children's Mobile Crisis Response Team [place] (603) 125-7685 or www.[employer]  · Call the toll free National Suicide Prevention Hotlines   · National Suicide Prevention Lifeline 299-692-AAWV (7700)  · National Hope Line Network 800-SUICIDE (116-1426)

## 2021-03-19 NOTE — DISCHARGE PLANNING
Anticipated Discharge Disposition: SNF    Action: LSW updated by AIDEE Chatman that [facility] has accepted Pt and has a bed available today. Transport will need to be set up by [facility].     LSW updated Pt, he is still agreeable to SNF but is concerned that his caregiver from [facility] is supposed to be bringing him his phone and wallet today and he doesn't want to miss them. LSW told Pt they could call [facility] and find out what time Pt caregiver is coming and that if they are going to come after Pt leaves they will need to go to [facility] to deliver his belongings.     LSW updated MD about bed availability. MD will start d/c summary.     LSW called [facility] to ask about Pt's caregiver coming to drop off belongings. Per Annika at [facility], she will call Pt's caregiver and find out the time. LSW received return call from Annika to report that Pt's caregiver hadn't spoken to him about getting his belongings but will call him in the hospital to discuss with him what he wants.     MARY ALICEW completed COB[facility] discharge packet and updated BS RN of 1400 [facility]  time.     Barriers to Discharge: Transportation to SNF    Plan: LSW to set up transport for Pt to d/c to [facility].

## 2021-03-19 NOTE — CARE PLAN
Problem: Communication  Goal: The ability to communicate needs accurately and effectively will improve  Outcome: PROGRESSING AS EXPECTED     Problem: Safety  Goal: Will remain free from injury  Outcome: PROGRESSING AS EXPECTED       Patient has bed lowered and placed in locked position, bed alarm is on. Call light placed at bedside and pt is asked to call for assistance.

## 2021-03-19 NOTE — DISCHARGE SUMMARY
Discharge Summary    CHIEF COMPLAINT ON ADMISSION  Chief Complaint   Patient presents with   • Trauma Green       Reason for Admission  trauma green 66M     Admission Date  3/5/2021    CODE STATUS  DNAR/DNI    HPI & HOSPITAL COURSE  Mr. Clint Durate is a 66 y.o. male with a history of stroke with right-sided hemiplegia, diabetes, COPD not on baseline oxygen who presented on 3/5/2021 with syncope.  Patient hit his head on his stove.  Had likely been down for several hours.  On presentation he was found to be hypoxic and treated for COPD exacerbation.  CT head, chest abdomen pelvis showed no acute abnormalities.  CT spine showed chronic compression fractures.  Echocardiogram showed ejection fraction 55%.  Patient did have persistent right rib pain and right shin pain after fall.  Imaging showed no fractures.  He did have a small hematoma on right shin and general surgery recommended compression wraps to help it reabsorb.  Patient has chronic right-sided hemiplegia and is wheelchair-bound at baseline.  PT/OT recommended placement.  Patient was accepted at [facility].  Medically stable for discharge to SNF.      Therefore, he is discharged in fair and stable condition to skilled nursing facility.    The patient met 2-midnight criteria for an inpatient stay at the time of discharge.    Discharge Date  3/19/2021    FOLLOW UP ITEMS POST DISCHARGE  None    DISCHARGE DIAGNOSES  Principal Problem (Resolved):    Acute exacerbation of chronic obstructive pulmonary disease (COPD) ([facility]) POA: Yes  Active Problems:    Acute respiratory failure with hypoxia ([facility]) POA: Yes    Leukocytosis POA: Yes    DM (diabetes mellitus) ([facility]) POA: Yes    Weakness POA: Yes    Essential hypertension POA: Yes    Discharge planning issues POA: Yes  Resolved Problems:    Syncope POA: Yes    Closed head injury POA: Yes    Encephalopathy POA: Yes      FOLLOW UP  [facility]  [address] [place] [address]  Edenilson Gan  [id number]  [phone]    As needed, If symptoms worsen      MEDICATIONS ON DISCHARGE     Medication List      START taking these medications      Instructions   aspirin 81 MG EC tablet  Start taking on: March 20, 2021   Take 1 tablet by mouth every day.  Dose: 81 mg     metoprolol tartrate 25 MG Tabs  Commonly known as: LOPRESSOR   Take 1 tablet by mouth 2 Times a Day.  Dose: 25 mg     omeprazole 40 MG delayed-release capsule  Start taking on: March 20, 2021  Commonly known as: PRILOSEC   Take 1 capsule by mouth every day.  Dose: 40 mg     oxyCODONE immediate-release 5 MG Tabs  Commonly known as: ROXICODONE   Take 1 tablet by mouth every four hours as needed for up to 5 days.  Dose: 5 mg        CONTINUE taking these medications      Instructions   acetaminophen 500 MG Tabs  Commonly known as: TYLENOL   Take 1,000 mg by mouth every bedtime.  Dose: 1,000 mg     Airborne Gummies Chew   Chew 4 Tablets every day.  Dose: 4 tablet     Albuterol Sulfate 108 (90 Base) MCG/ACT Aepb   Inhale 2 Puffs every 6 hours as needed (SOB).  Dose: 2 Puff     Alogliptin Benzoate 25 MG Tabs   Take 25 mg by mouth every day.  Dose: 25 mg     amLODIPine 10 MG Tabs  Commonly known as: NORVASC   Take 10 mg by mouth every day.  Dose: 10 mg     atorvastatin 40 MG Tabs  Commonly known as: LIPITOR   Take 40 mg by mouth every evening.  Dose: 40 mg     clopidogrel 75 MG Tabs  Commonly known as: PLAVIX   Take 75 mg by mouth every day.  Dose: 75 mg     cyclobenzaprine 10 mg Tabs  Commonly known as: Flexeril   Take 5 mg by mouth at bedtime as needed. 1/2 tablet = 5 mg  Dose: 5 mg     Epclusa 400-100 MG Tabs  Generic drug: Sofosbuvir-Velpatasvir   Take 1 tablet by mouth every day.  Dose: 1 tablet     gabapentin 300 MG Caps  Commonly known as: NEURONTIN   Take 600 mg by mouth every bedtime. 2 caps = 600 mg  Dose: 600 mg     glimepiride 4 MG Tabs  Commonly known as: AMARYL   Take 2 mg by mouth every morning. 1/2 tablet = 2 mg  Dose: 2 mg     lidocaine 5 %  "Oint  Commonly known as: XYLOCAINE   Apply 1 Each topically 3 times a day as needed.  Dose: 1 Each     metFORMIN  MG Tb24  Commonly known as: GLUCOPHAGE XR   Take 1,000 mg by mouth every morning. 2 tablets = 1000 mg  Dose: 1,000 mg     Spiriva Respimat 2.5 mcg/Act Aers  Generic drug: tiotropium   Inhale 5 mcg every day. 2 puffs = 5 mcg  Dose: 5 mcg     valsartan 320 MG tablet  Commonly known as: DIOVAN   Take 320 mg by mouth every bedtime.  Dose: 320 mg     Voltaren 1 % Gel  Generic drug: diclofenac sodium   Apply 1 Application topically as needed.  Dose: 1 Application            Allergies  Allergies   Allergen Reactions   • Contrast Media With Iodine [Iodine] Unspecified     Unsure happened along time ago when he had a kidney stone he thinks the reaction was that he \"passed out\" and made him \"Sick\"        DIET  Orders Placed This Encounter   Procedures   • Diet Order Diet: Level 6 - Soft and Bite Sized (Cardiac); Liquid level: Level 0 - Thin; Second Modifier: (optional): Consistent CHO (Diabetic)     Standing Status:   Standing     Number of Occurrences:   1     Order Specific Question:   Diet:     Answer:   Level 6 - Soft and Bite Sized [23]     Comments:   Cardiac     Order Specific Question:   Liquid level     Answer:   Level 0 - Thin     Order Specific Question:   Second Modifier: (optional)     Answer:   Consistent CHO (Diabetic) [4]       ACTIVITY  As tolerated.  Weight bearing as tolerated    CONSULTATIONS  None    PROCEDURES  None    LABORATORY  Lab Results   Component Value Date    SODIUM 135 03/18/2021    POTASSIUM 4.0 03/18/2021    CHLORIDE 100 03/18/2021    CO2 27 03/18/2021    GLUCOSE 177 (H) 03/18/2021    BUN 12 03/18/2021    CREATININE 0.97 03/18/2021        Lab Results   Component Value Date    WBC 11.4 (H) 03/18/2021    HEMOGLOBIN 14.4 03/18/2021    HEMATOCRIT 43.0 03/18/2021    PLATELETCT 308 03/18/2021        I discussed medications and side effects with the patient.  I discussed prognosis " and importance of medical compliance with the patient.  I counseled the patient about diet, exercise, weight loss, smoking cessation, and life style modifications.  All questions and concerns have been addressed.  Total time of the discharge process was 36 minutes.

## 2021-03-19 NOTE — PROGRESS NOTES
Report received from dayshift RN. Assumed care, assessment complete at 7679. Pt is A & O x 3, disoriented to time.  Pt reports pain at this time to right rib cage, see MAR 8/10. Fall precautions and appropriate signs in place. Pt oriented to unit routine, call light/phone system and RN extension number provided. Pt educated regarding fall precautions. Bed alarm in use. Pt denies any additional needs at this time. Call light within reach.

## 2021-03-19 NOTE — PROGRESS NOTES
Assumed care of pt at 0700 from the night shift nurse. Pt is A&Ox4, he complains of 7/10 pain in his right leg and right ribs but is manageable right now. Pt assessed and he declines all other needs. Hourly rounding I place as well as bedside commitment.

## 2021-03-19 NOTE — CARE PLAN
Problem: Bowel/Gastric:  Goal: Normal bowel function is maintained or improved  Outcome: PROGRESSING SLOWER THAN EXPECTED  Patient has not had a bowel movement in over 4 days. Pt takes scheduled stool softeners. Will assess patient's bowel habits and aim for a bowel movement today with upgraded stool softener PRN medications.    Problem: Pain Management  Goal: Pain level will decrease to patient's comfort goal  Outcome: PROGRESSING SLOWER THAN EXPECTED   PT continues to complain of high amounts of pain. Pain medication given as needed, pt repositioned for comfort, and pt educated about non pharmacological pain control methods.

## 2024-04-29 NOTE — THERAPY
ENT POST OP    Septo/turb reduction         DATE OF OPERATION: 03/13/2024     PREOPERATIVE DIAGNOSES:  1. Nasal septal deviation.  2. Bilateral inferior turbinate hypertrophy.     OPERATION PERFORMED:  1. Septoplasty.  2. Bilateral inferior turbinate microdebrider submucosal resection and outfracture.     OPERATIVE FINDINGS: Bony cartilaginous nasal septal deviation with large spur pushing into middle turbinate posteriorly and medially with  inferior turbinate hypertrophy bilaterally. Rent in several placed on left. One area on right. There is small area of overlap at mid inferior septum.        S: he can breathe. Still has chunks coming out. Still doing saline sprays, not rinses. Smell is better than before.     Exam:   External nose normal    Nasal Endoscopy with Debridement    Nasal endoscopy with debridement was performed in accordance with the 0 day global period for endoscopic sinus surgery and are unrelated to any other recently performed procedures.     Topical Agents: Topical 0.25% phenylephrine and 4% lidocaine    A 0 degree rigid nasal endoscope was inserted into the nose to visualize the nasal passageway and paranasal sinuses. Under endoscopic visualization, a combination of instruments including suction and grasping forceps were used to debride crust, debris, inflammatory tissue and nasal polyps from the nasal cavity, paranasal sinuses and sinus drainage pathways. This was performed to aid in healing and optimize the patency and function of the sinus cavities and nasal passageways. This is necessary to avoid scar formation, infection, and mucocele formation. In addition, this facilitates in the optimal instillation of topical therapies, saline irrigations, long-term disease surveillance, and endoscopically-derived cultures. Examination of the inferior turbinates, middle turbinates, middle meatus, operated sinuses, sphenoethmoidal recess, and nasopharynx was undertaken. All findings were normal for this  Physical Therapy   Initial Evaluation     Patient Name: Clint Duarte  Age:  66 y.o., Sex:  male  Medical Record #: [id number]  Today's Date: 3/6/2021     Precautions: Fall Risk    Assessment  Patient is 66 y.o. male with a diagnosis of Encephalopathy post head trauma.  Patient appears to be at baseline, however patient may need additional support as patient has low level of functional mobility and has cognitive deficits at baseline.  Defer discharge planning to social work and medical team as patient appears to be at physical baseline based on his physical description of PLOF.  PT will not follow.     Plan    Recommend Physical Therapy for Evaluation only     DC Equipment Recommendations: None  Discharge Recommendations: Other - Defer to social work, as patient is at baseline, however may need additional social support.     Objective     03/06/21 1618   Precautions   Precautions Fall Risk   Comments encephalopathy and closed head injury (found on ground)   Prior Living Situation   Prior Services None   Housing / Facility 1 Story Apartment / Condo  (on the 5th floor)   Elevator Yes   Equipment Owned Power Wheel Chair   Lives with - Patient's Self Care Capacity Alone and Able to Care For Self   Comments past admission shows home is shelter, this is what patient reports   Prior Level of Functional Mobility   Bed Mobility Independent   Transfer Status Independent   Ambulation Unable To Determine At This Time   Assistive Devices Used Power Wheel Chair   Wheelchair Independent   History of Falls   History of Falls Yes   Date of Last Fall   (this episode)   Cognition    Cognition / Consciousness X   Level of Consciousness Alert   New Learning Impaired   Comments appears to have baseline cognitive deficits, waving/weaning cognition - agitable at times   Passive ROM Lower Body   Passive ROM Lower Body WDL   Active ROM Lower Body    Active ROM Lower Body  X   Comments RLE limited, pt can use functionally but not upon command    Strength Lower Body   Lower Body Strength  X   Comments RLE significantly limited, but utilizes tone for transfers   Balance Assessment   Sitting Balance (Static) Fair   Sitting Balance (Dynamic) Fair   Standing Balance (Static) Fair -   Gait Analysis   Gait Level Of Assist Unable to Participate   Bed Mobility    Supine to Sit Supervised  (CTG)   Sit to Supine Supervised  (CTG)   Scooting Supervised   Functional Mobility   Sit to Stand Minimal Assist   Bed, Chair, Wheelchair Transfer Supervised   Transfer Method Squat Pivot   Comments have power chair at home per pt   How much difficulty does the patient currently have...   Turning over in bed (including adjusting bedclothes, sheets and blankets)? 2   Sitting down on and standing up from a chair with arms (e.g., wheelchair, bedside commode, etc.) 1   Moving from lying on back to sitting on the side of the bed? 2   How much help from another person does the patient currently need...   Moving to and from a bed to a chair (including a wheelchair)? 2   Need to walk in a hospital room? 1   Climbing 3-5 steps with a railing? 1   6 clicks Mobility Score 9   Education Group   Education Provided Role of Physical Therapist   Role of Physical Therapist Patient Response Patient;Acceptance;Explanation;Demonstration;Verbal Demonstration;Action Demonstration   Anticipated Discharge Equipment and Recommendations   DC Equipment Recommendations None          stage in healing and included:    LEFT: large crusting at middle turbinate head removed. Adhesion to septum lysed at middle turbinate. Still healing. Inferior turbinate reduced.   RIGHT: Healing well. Turbinate reduced.        A/P:   Healing well except scabbing and synechia left middle turbinate to septum. Removed and lysed today  Saline left nostril only 2-3 times daily until follow up  Rtc 1 month rescope        Geovanna Wisdom MD